# Patient Record
Sex: FEMALE | Race: WHITE
[De-identification: names, ages, dates, MRNs, and addresses within clinical notes are randomized per-mention and may not be internally consistent; named-entity substitution may affect disease eponyms.]

---

## 2021-01-29 ENCOUNTER — HOSPITAL ENCOUNTER (INPATIENT)
Dept: HOSPITAL 92 - ERS | Age: 76
LOS: 4 days | Discharge: NURSING, REHAB OR LONG-TERM CARE | DRG: 377 | End: 2021-02-02
Attending: HOSPITALIST | Admitting: INTERNAL MEDICINE
Payer: MEDICARE

## 2021-01-29 VITALS — BODY MASS INDEX: 28.3 KG/M2

## 2021-01-29 DIAGNOSIS — Z79.899: ICD-10-CM

## 2021-01-29 DIAGNOSIS — Z86.16: ICD-10-CM

## 2021-01-29 DIAGNOSIS — Z90.710: ICD-10-CM

## 2021-01-29 DIAGNOSIS — I25.10: ICD-10-CM

## 2021-01-29 DIAGNOSIS — Z79.4: ICD-10-CM

## 2021-01-29 DIAGNOSIS — N17.9: ICD-10-CM

## 2021-01-29 DIAGNOSIS — Z88.5: ICD-10-CM

## 2021-01-29 DIAGNOSIS — B33.24: ICD-10-CM

## 2021-01-29 DIAGNOSIS — F03.90: ICD-10-CM

## 2021-01-29 DIAGNOSIS — Z79.890: ICD-10-CM

## 2021-01-29 DIAGNOSIS — Z88.8: ICD-10-CM

## 2021-01-29 DIAGNOSIS — Z20.822: ICD-10-CM

## 2021-01-29 DIAGNOSIS — E78.5: ICD-10-CM

## 2021-01-29 DIAGNOSIS — T46.0X5A: ICD-10-CM

## 2021-01-29 DIAGNOSIS — K25.4: Primary | ICD-10-CM

## 2021-01-29 DIAGNOSIS — N39.0: ICD-10-CM

## 2021-01-29 DIAGNOSIS — E03.9: ICD-10-CM

## 2021-01-29 DIAGNOSIS — Z88.4: ICD-10-CM

## 2021-01-29 DIAGNOSIS — T83.511A: ICD-10-CM

## 2021-01-29 DIAGNOSIS — E11.9: ICD-10-CM

## 2021-01-29 DIAGNOSIS — Z95.5: ICD-10-CM

## 2021-01-29 DIAGNOSIS — Z87.891: ICD-10-CM

## 2021-01-29 DIAGNOSIS — E87.1: ICD-10-CM
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
## 2021-01-29 DIAGNOSIS — R79.89: ICD-10-CM

## 2021-01-29 DIAGNOSIS — J12.89: ICD-10-CM

## 2021-01-29 DIAGNOSIS — D62: ICD-10-CM

## 2021-01-29 DIAGNOSIS — B96.5: ICD-10-CM

## 2021-01-29 DIAGNOSIS — Z90.49: ICD-10-CM

## 2021-01-29 DIAGNOSIS — I10: ICD-10-CM

## 2021-01-29 DIAGNOSIS — E87.5: ICD-10-CM

## 2021-01-29 DIAGNOSIS — Z79.01: ICD-10-CM

## 2021-01-29 DIAGNOSIS — I48.0: ICD-10-CM

## 2021-01-29 DIAGNOSIS — Z79.82: ICD-10-CM

## 2021-01-29 DIAGNOSIS — Z95.1: ICD-10-CM

## 2021-01-29 DIAGNOSIS — G93.41: ICD-10-CM

## 2021-01-29 DIAGNOSIS — Z79.51: ICD-10-CM

## 2021-01-29 LAB
ALBUMIN SERPL BCG-MCNC: 2.7 G/DL (ref 3.4–4.8)
ALP SERPL-CCNC: 161 U/L (ref 40–110)
ALT SERPL W P-5'-P-CCNC: 18 U/L (ref 8–55)
ANION GAP SERPL CALC-SCNC: 10 MMOL/L (ref 10–20)
ANION GAP SERPL CALC-SCNC: 12 MMOL/L (ref 10–20)
ANION GAP SERPL CALC-SCNC: 13 MMOL/L (ref 10–20)
APTT PPP: 33.4 SEC (ref 22.9–36.1)
AST SERPL-CCNC: 17 U/L (ref 5–34)
BACTERIA UR QL AUTO: (no result) HPF
BASOPHILS # BLD AUTO: 0 THOU/UL (ref 0–0.2)
BASOPHILS NFR BLD AUTO: 0.3 % (ref 0–1)
BILIRUB SERPL-MCNC: 0.4 MG/DL (ref 0.2–1.2)
BUN SERPL-MCNC: 43 MG/DL (ref 9.8–20.1)
BUN SERPL-MCNC: 54 MG/DL (ref 9.8–20.1)
BUN SERPL-MCNC: 62 MG/DL (ref 9.8–20.1)
CALCIUM SERPL-MCNC: 7.5 MG/DL (ref 7.8–10.44)
CALCIUM SERPL-MCNC: 7.6 MG/DL (ref 7.8–10.44)
CALCIUM SERPL-MCNC: 7.8 MG/DL (ref 7.8–10.44)
CHLORIDE SERPL-SCNC: 101 MMOL/L (ref 98–107)
CHLORIDE SERPL-SCNC: 96 MMOL/L (ref 98–107)
CHLORIDE SERPL-SCNC: 99 MMOL/L (ref 98–107)
CO2 SERPL-SCNC: 25 MMOL/L (ref 23–31)
CO2 SERPL-SCNC: 26 MMOL/L (ref 23–31)
CO2 SERPL-SCNC: 26 MMOL/L (ref 23–31)
CREAT CL PREDICTED SERPL C-G-VRATE: 0 ML/MIN (ref 70–130)
CREAT CL PREDICTED SERPL C-G-VRATE: 0 ML/MIN (ref 70–130)
CREAT CL PREDICTED SERPL C-G-VRATE: 76 ML/MIN (ref 70–130)
DIGOXIN SERPL-MCNC: 2.04 NG/ML (ref 0.8–2)
EOSINOPHIL # BLD AUTO: 0.2 THOU/UL (ref 0–0.7)
EOSINOPHIL NFR BLD AUTO: 1.6 % (ref 0–10)
GLOBULIN SER CALC-MCNC: 2.1 G/DL (ref 2.4–3.5)
GLUCOSE SERPL-MCNC: 127 MG/DL (ref 83–110)
GLUCOSE SERPL-MCNC: 139 MG/DL (ref 83–110)
GLUCOSE SERPL-MCNC: 92 MG/DL (ref 83–110)
HGB BLD-MCNC: 6.8 G/DL (ref 12–16)
HGB BLD-MCNC: 9.7 G/DL (ref 12–16)
INR PPP: 1.2
LEUKOCYTE ESTERASE UR QL STRIP.AUTO: 500 LEU/UL
LYMPHOCYTES # BLD: 0.9 THOU/UL (ref 1.2–3.4)
LYMPHOCYTES NFR BLD AUTO: 9.2 % (ref 21–51)
MCH RBC QN AUTO: 25.4 PG (ref 27–31)
MCV RBC AUTO: 83 FL (ref 78–98)
MONOCYTES # BLD AUTO: 0.4 THOU/UL (ref 0.11–0.59)
MONOCYTES NFR BLD AUTO: 4.4 % (ref 0–10)
NEUTROPHILS # BLD AUTO: 8.5 THOU/UL (ref 1.4–6.5)
NEUTROPHILS NFR BLD AUTO: 84.5 % (ref 42–75)
PLATELET # BLD AUTO: 379 THOU/UL (ref 130–400)
POTASSIUM SERPL-SCNC: 3.9 MMOL/L (ref 3.5–5.1)
POTASSIUM SERPL-SCNC: 4.5 MMOL/L (ref 3.5–5.1)
POTASSIUM SERPL-SCNC: 5.2 MMOL/L (ref 3.5–5.1)
PROT UR STRIP.AUTO-MCNC: 30 MG/DL
PROTHROMBIN TIME: 15.5 SEC (ref 12–14.7)
RBC # BLD AUTO: 2.68 MILL/UL (ref 4.2–5.4)
SODIUM SERPL-SCNC: 129 MMOL/L (ref 136–145)
SODIUM SERPL-SCNC: 132 MMOL/L (ref 136–145)
SODIUM SERPL-SCNC: 133 MMOL/L (ref 136–145)
SP GR UR STRIP: 1.01 (ref 1–1.04)
WBC # BLD AUTO: 10 THOU/UL (ref 4.8–10.8)

## 2021-01-29 PROCEDURE — 30233N1 TRANSFUSION OF NONAUTOLOGOUS RED BLOOD CELLS INTO PERIPHERAL VEIN, PERCUTANEOUS APPROACH: ICD-10-PCS | Performed by: INTERNAL MEDICINE

## 2021-01-29 PROCEDURE — 86900 BLOOD TYPING SEROLOGIC ABO: CPT

## 2021-01-29 PROCEDURE — 81003 URINALYSIS AUTO W/O SCOPE: CPT

## 2021-01-29 PROCEDURE — C9113 INJ PANTOPRAZOLE SODIUM, VIA: HCPCS

## 2021-01-29 PROCEDURE — 96375 TX/PRO/DX INJ NEW DRUG ADDON: CPT

## 2021-01-29 PROCEDURE — 85730 THROMBOPLASTIN TIME PARTIAL: CPT

## 2021-01-29 PROCEDURE — 80162 ASSAY OF DIGOXIN TOTAL: CPT

## 2021-01-29 PROCEDURE — 87186 SC STD MICRODIL/AGAR DIL: CPT

## 2021-01-29 PROCEDURE — 83735 ASSAY OF MAGNESIUM: CPT

## 2021-01-29 PROCEDURE — 85610 PROTHROMBIN TIME: CPT

## 2021-01-29 PROCEDURE — 36415 COLL VENOUS BLD VENIPUNCTURE: CPT

## 2021-01-29 PROCEDURE — 82270 OCCULT BLOOD FECES: CPT

## 2021-01-29 PROCEDURE — P9016 RBC LEUKOCYTES REDUCED: HCPCS

## 2021-01-29 PROCEDURE — 36430 TRANSFUSION BLD/BLD COMPNT: CPT

## 2021-01-29 PROCEDURE — 93306 TTE W/DOPPLER COMPLETE: CPT

## 2021-01-29 PROCEDURE — 86901 BLOOD TYPING SEROLOGIC RH(D): CPT

## 2021-01-29 PROCEDURE — 85027 COMPLETE CBC AUTOMATED: CPT

## 2021-01-29 PROCEDURE — 86850 RBC ANTIBODY SCREEN: CPT

## 2021-01-29 PROCEDURE — 96365 THER/PROPH/DIAG IV INF INIT: CPT

## 2021-01-29 PROCEDURE — 88312 SPECIAL STAINS GROUP 1: CPT

## 2021-01-29 PROCEDURE — 80053 COMPREHEN METABOLIC PANEL: CPT

## 2021-01-29 PROCEDURE — 94760 N-INVAS EAR/PLS OXIMETRY 1: CPT

## 2021-01-29 PROCEDURE — 36416 COLLJ CAPILLARY BLOOD SPEC: CPT

## 2021-01-29 PROCEDURE — 87040 BLOOD CULTURE FOR BACTERIA: CPT

## 2021-01-29 PROCEDURE — 87077 CULTURE AEROBIC IDENTIFY: CPT

## 2021-01-29 PROCEDURE — 88305 TISSUE EXAM BY PATHOLOGIST: CPT

## 2021-01-29 PROCEDURE — 83605 ASSAY OF LACTIC ACID: CPT

## 2021-01-29 PROCEDURE — 87086 URINE CULTURE/COLONY COUNT: CPT

## 2021-01-29 PROCEDURE — 81015 MICROSCOPIC EXAM OF URINE: CPT

## 2021-01-29 PROCEDURE — 80048 BASIC METABOLIC PNL TOTAL CA: CPT

## 2021-01-29 PROCEDURE — 93010 ELECTROCARDIOGRAM REPORT: CPT

## 2021-01-29 PROCEDURE — 93005 ELECTROCARDIOGRAM TRACING: CPT

## 2021-01-29 PROCEDURE — 85025 COMPLETE CBC W/AUTO DIFF WBC: CPT

## 2021-01-29 NOTE — CON
DATE OF CONSULTATION:  01/29/2021



REASON FOR CONSULTATION:  Elevated digoxin level and history of atrial fibrillation.



PRIMARY CARDIOLOGIST:  Jj Donnelly MD



HISTORY OF PRESENT ILLNESS:  Ms. Garza is a very pleasant 75-year-old white female,

who comes to the hospital for anemia.  She was in the hospital for COVID-19

pneumonia in December of this year.  At that point, she developed what appeared to

be a viral cardiomyopathy, EF was as low as 20%.  She also developed AFib and RVR

and she was placed on diltiazem, Multaq, and digoxin.  She eventually had

improvement of her LV function up to 50% on the last echo on the 8th of December.

She was discharged home eventually.  She is back here as she was confused.  She was

found to have urinary tract infection.  She had fevers and confusion.  She has some

level of dementia.  She was also found to have a very low hemoglobin of around 6.

She has been transfused, and Cardiology has been consulted as her digoxin level was

just slightly above normal, so there was a question as to any level of digitoxicity

present.  On my evaluation, Ms. Garza is still pleasantly demented.  She seems to

have moments of lucidity and then she goes on a tangent and realized that she may

have not been comprehending what you were asking her to begin with.  She is denying

any chest pain, no tightness, no passing out as far as she can tell; however, her

history is not entirely accurate. 



PAST MEDICAL HISTORY:  

1. Paroxysmal atrial fibrillation.

2. Coronary artery disease, status post CABG.

3. Status post mitral valve repair.

4. Type 2 diabetes.

5. Hypertension.

6. COVID-19 infection in December.

7. 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

most recent EF at 50%, resolved. 

8. Dementia.



PAST SURGICAL HISTORY:  

1. CABG as above.

2. Heart catheterization and angioplasty with stent placement in the past.

3. Apparent history of mitral valve repair.

4. Watchman device placement several years back secondary to vaginal bleeding.

5. Hysterectomy.

6. Cholecystectomy.

7. Right wrist surgery.



OUTPATIENT MEDICATIONS:  

1. Lasix 40 mg a day.

2. Metformin.

3. Aspirin 81 mg a day.

4. Levothyroxine 112 mcg a day.

5. Metolazone 5 mg a day.

6. Potassium chloride 60 mEq a day.

7. Spironolactone 25 mg a day.

8. Dronedarone 400 mg b.i.d.

9. Eliquis 5 mg p.o. b.i.d.

10. Gabapentin 100 mg p.o. t.i.d.

11. Cozaar 25 mg at bedtime.

12. Pantoprazole 40 mg a day.

13. Alogliptin 25 mg a day.

14. Digoxin 0.125 mg a day.

15. Diltiazem 120 mg a day.

16. Ventolin p.r.n.



ALLERGIES:  

1. CAROL PARABENS.

2. ATORVASTATIN.

3. CODEINE.

4. ESOMEPRAZOLE.

5. LEVOFLOXACIN.

6. SIMVASTATIN.



FAMILY HISTORY:  Noncontributory, obtained from the record.



SOCIAL HISTORY:  Quit smoking 10 years ago.  No alcohol or drugs.



REVIEW OF SYSTEMS:  Difficult to obtain secondary to waxing and waning mental status.



PHYSICAL EXAMINATION:

VITAL SIGNS:  Temperature 98.2, max in the ER was 100.5, pulse of 77, respiratory

rate 20, saturating 95% on 3 L, and blood pressure 110/60. 

GENERAL:  Awake, alert, and oriented to person and place.  She waxes and wanes and

she could not get time, in no distress. 

HEENT:  Normocephalic, atraumatic. 

NECK:  Supple. 

LUNGS:  Clear. 

CARDIOVASCULAR:  S1 and S2, heart rate in the 70s. 

ABDOMEN:  Soft.  Positive bowel sounds. 

EXTREMITIES:  1+ edema. 

SKIN:  Warm and dry.



DIAGNOSTIC STUDIES:  Laboratory work was reviewed.  CBC with a white count of 10,

hemoglobin of 6.8, hematocrit 22, platelet count of 379.  Coags; INR 1.2.  Chemistry

with a sodium of 129 on admission, up to 133; potassium was 5.2 on admission, down

to 4.5.  Creatinine better from 1.3 to 1.2.  UA was positive with 2+ nitrites and

greater than 50 white cells and 1+ bacteria.  Digoxin level was 2.04, which is just

slightly above normal level, which is 2.0. 



EKG was reviewed.  It got a lot of baseline artifacts, so cannot completely rule out

this being just a normal sinus rhythm with heart rate in the 70s, seems fairly

regular.  Computer read it out as junctional.  I do not think I have a good enough

EKG to give a diagnosis of junctional rhythm.  We will repeat EKG. 



ASSESSMENT AND PLAN:  

1. Paroxysmal atrial fibrillation.

2. Acute blood loss anemia, most likely related to the Eliquis.  No overt blood

loss, however, there is possible dark stool on history. 

3. History of Watchman device placement.  Unclear as to why she was restarted on the

Eliquis.  It may be that the Watchman was not closing the appendage adequately.

However, we will request records, try to see the reason for this. 

4. Elevated digoxin level.

5. Hyperkalemia, resolved.

6. Recent COVID-19 infection.

7. Recent viral cardiomyopathy with reduced ejection fraction, now improved and most

recent echo with normalized ejection fraction. 



PLAN:  

1. Definitely, I have to hold digoxin.

2. She is not digitoxic, however, her level being a little bit elevated and her

creatinine being slightly elevated, would make me want to stop digoxin. 

3. We would transfer her to telemetry.

4. We would stop Multaq and diltiazem at this time until digoxin level is lower and

I make sure that she is not in junctional rhythm. 

5. We will get an echocardiogram to make sure her LV function has not changed from

the last one just about a month and a half ago. 

6. We would discontinue Eliquis indefinitely.  She has a Watchman device.  I am

going to try to get records to see why she is on full anticoagulation as she already

has a Watchman, which I can see on her chest x-ray. 



Thank you for letting us to participate in the care of your patient.  We will follow.







Job ID:  [id number]

## 2021-01-29 NOTE — PDOC.HHP
Hospitalist HPI


Abnormal labs


History of Present Illness: 





Ms. Garza is a 75-year-old female with past medical history of A. fib on E

liquis,  type 2 diabetes mellitus, hypertension, status post COVID-19 infection 

in December.,  Coronary artery disease status post CABG, dementia of who 

presents from [facility] for abnormal labs.  Patient initially discharged from 

hospital to rehab center after Covid infection in December.  On presentation 

patient reports multiple dark stools.  History limited by patient's dementia.  

Patient able to answer questions but often gets confused during interview.





On my interview she denies ever having any bloody or dark stools although did 

endorse several dark bloody stools to the emergency room physician.  She reports

her breathing feels fine, but does report her breathing was worse in December 

when she had Covid.  She denies any abdominal pain no nausea vomiting diarrhea 

no hematochezia or hematemesis.  Patient has an indwelling Gonzalez catheter and 

denies suprapubic pain or any changes in urination.  She does report that she is

on a blood thinner Eliquis that she takes daily.  She reports that she feels 

tired, but denies any dizziness lightheadedness changes in vision.  Denies chest

pain, shortness of breath, palpitations.  Denies numbness weakness or 

paresthesias.





In emergency room initial vital signs 115/59, 72, 18, 96% on 2 L, 100.5.  WBC 

10.0, H/H 6.8/22.2, platelets 379.  BUN/CR 62/1.38, sodium 129, potassium 5.2.  

Guaiac positive.  Patient received ceftriaxone, Protonix, 1 L of normal saline 

and 2 units of packed red blood cells were started.


Allergies/Adverse Reactions: 


                                        











Allergy/AdvReac Type Severity Reaction Status Date / Time


 


Anesthetics - Amide Type - Allergy   Verified 12/02/20 18:59





Select A     


 


Anesthetics - Miriam Type- Allergy   Verified 12/02/20 18:59





Parabens     


 


atorvastatin Allergy   Verified 01/29/21 16:17


 


codeine [Codeine] Allergy   Verified 01/29/21 16:16


 


esomeprazole Allergy   Verified 01/29/21 16:17


 


levofloxacin [From Levaquin] Allergy   Verified 01/29/21 16:16


 


simvastatin Allergy   Verified 01/29/21 16:17











Home Medications: 


                                        











 Medication  Instructions  Recorded  Confirmed  Type


 


Acetaminophen [Tylenol 8 Hour] 650 mg PO Q6HR PRN 01/29/21 01/29/21 History


 


Albuterol Sulfate [Albuterol 0.63 mg NEB Q4HR PRN 01/29/21 01/29/21 History





Sulfate Neb]    


 


Albuterol Sulfate [Albuterol 0.63 mg NEB Q6HR PRN 01/29/21 01/29/21 History





Sulfate Neb]    


 


Albuterol Sulfate [Ventolin HFA] 1 puff INH Q6HR PRN 01/29/21 01/29/21 History


 


Alogliptin Benzoate [Nesina] 25 mg PO DAILY 01/29/21 01/29/21 History


 


Apixaban [Eliquis] 5 mg PO BID 01/29/21 01/29/21 History


 


Aspirin [Aspirin EC] 81 mg PO DAILY 01/29/21 01/29/21 History


 


Budesonide [Pulmicort] 0.5 mg IH Q12HR PRN 01/29/21 01/29/21 History


 


Digoxin [Lanoxin] 0.125 mg PO DAILY 01/29/21 01/29/21 History


 


Diltiazem HCl 120 mg PO TID 01/29/21 01/29/21 History


 


Dronedarone HCl [Multaq] 400 mg PO BID-WM 01/29/21 01/29/21 History


 


Furosemide 40 mg PO DAILY 01/29/21 01/29/21 History


 


Gabapentin 200 mg PO TID 01/29/21 01/29/21 History


 


Ibuprofen 600 mg PO DAILY 01/29/21 01/29/21 History


 


Insulin Glargine,Hum.Rec.Anlog 20 unit SQ HS 01/29/21 01/29/21 History





[Basaglar Kwikpen U-100]    


 


Levothyroxine Sodium 125 mcg PO DAILY 01/29/21 01/29/21 History





[Levothyroxine]    


 


Losartan [Cozaar] 25 mg PO DAILY 01/29/21 01/29/21 History


 


Magnesium Oxide [Magnesium] 400 mg PO DAILY 01/29/21 01/29/21 History


 


Metolazone [Zaroxolyn] 5 mg PO DAILY 01/29/21 01/29/21 History


 


Pantoprazole [Protonix] 40 mg PO DAILY 01/29/21 01/29/21 History


 


Polyethylene Glycol 3350 [Miralax] 17 gm PO Q12HR PRN 01/29/21 01/29/21 History


 


Potassium Chloride 20 meq PO DAILY 01/29/21 01/29/21 History


 


Sennosides [Senokot] 1 tab PO DAILY PRN 01/29/21 01/29/21 History


 


Spironolactone 25 mg PO DAILY 01/29/21 01/29/21 History


 


metFORMIN [Glucophage] 500 mg PO BID-WM 01/29/21 01/29/21 History


 


traMADol HCl [Tramadol HCl] 50 mg PO Q6HR PRN 01/29/21 01/29/21 History











Past History: 


PMHx:   A. fib on Eliquis, digoxin, coronary artery disease status post CABG, 

type 2 diabetes mellitus, hypertension, hyperlipidemia, indwelling Gonzalez, status

post COVID-19 infection in December 2020.





PSHx:   CABG, angioplasty with stent placement





FHx:   Unable to obtain





Social: Former tobacco smoker with a 10 years ago, no alcohol or drug use








Hospitalist HPI ROS


ROS unobtainable: due to mental status


Constitutional: reports: weakness, malaise


Eyes: denies: vision change


ENT: denies: nose congestion, mouth swelling, throat pain


Respiratory: denies: cough, dry, shortness of breath, hemoptysis, SOB with 

excertion, pleuritic pain, sputum, wheezing, other


Cardiovascular: denies: chest pain, palpitations, orthopnea, paroxysmal noc. 

dyspnea, edema, light headedness, other


Gastrointestinal: reports: melena.  denies: nausea, vomiting, abdominal pain, 

diarrhea, constipation, hematochezia, other


Genitourinary: denies: dysuria, frequency, incontinence, hematuria, retention, 

other


Musculoskeletal: denies: neck pain, shoulder pain, arm pain, back pain, hand 

pain, leg pain, foot pain, other


Skin: denies: rash, lesions, chelle, bruising, other


Neurological: denies: weakness, numbness, incoordination, change in speech, 

confusion, seizures, other





Hospitalist Exam


General Appearance: NAD, awake alert


Eye: PERRL, anicteric sclera


ENT: normocephalic atraumatic, no oropharyngeal lesions, moist mucosa


Neck: supple, symmetric, no JVD, no thyromegaly, no lymphadenopathy, no carotid 

bruit


Heart: no gallops, no rubs, normal peripheral pulses, murmur present


Respiratory: CTAB, no wheezes, no rales, no ronchi, normal chest expansion, no 

tachypnea, normal percussion


Gastrointestinal: soft, non-tender, non-distended, normal bowel sounds, no 

palpable masses, no hepatomegaly, no splenomegaly, no bruit


Extremities: no cyanosis, no clubbing


Extremities - other findings: Trace peripheral edema


Skin: normal turgor, no lesions, no rashes


Neurological: cranial nerve grossly intact, normal sensation to touch, no 

weakness, no focal deficits, no new deficit


Musculoskeletal: normal tone, normal strength, no muscle wasting


Psychiatric: normal behavior, oriented to person


Psychiatric - other findings: Confused ANO x2





Hospitalist Results


Result Diagrams: 


                                 01/29/21 11:01





                                 01/29/21 15:16


Lab results: 


                             Laboratory Last Values











WBC  10.0 thou/uL (4.8-10.8)   01/29/21  11:01    


 


RBC  2.68 mill/uL (4.20-5.40)  L  01/29/21  11:01    


 


Hgb  6.8 g/dL (12.0-16.0)  L  01/29/21  11:01    


 


Hct  22.2 % (36.0-47.0)  L  01/29/21  11:01    


 


MCV  83.0 fL (78.0-98.0)   01/29/21  11:01    


 


MCH  25.4 pg (27.0-31.0)  L  01/29/21  11:01    


 


MCHC  30.6 g/dL (32.0-36.0)  L  01/29/21  11:01    


 


RDW  15.7 % (11.5-14.5)  H  01/29/21  11:01    


 


Plt Count  379 thou/uL (130-400)   01/29/21  11:01    


 


MPV  8.4 fL (7.4-10.4)   01/29/21  11:01    


 


Neutrophils %  84.5 % (42.0-75.0)  H  01/29/21  11:01    


 


Lymphocytes %  9.2 % (21.0-51.0)  L  01/29/21  11:01    


 


Monocytes %  4.4 % (0.0-10.0)   01/29/21  11:01    


 


Eosinophils %  1.6 % (0.0-10.0)   01/29/21  11:01    


 


Basophils %  0.3 % (0.0-1.0)   01/29/21  11:01    


 


Neutrophils #  8.5 thou/uL (1.40-6.50)  H  01/29/21  11:01    


 


Lymphocytes #  0.9 thou/uL (1.20-3.40)  L  01/29/21  11:01    


 


Monocytes #  0.4 thou/uL (0.11-0.59)   01/29/21  11:01    


 


Eosinophils #  0.2 thou/uL (0.0-0.7)   01/29/21  11:01    


 


Basophils #  0.0 thou/uL (0.0-0.2)   01/29/21  11:01    


 


PT  15.5 sec (12.0-14.7)  H  01/29/21  11:01    


 


INR  1.2   01/29/21  11:01    


 


APTT  33.4 sec (22.9-36.1)   01/29/21  11:01    


 


Sodium  129 mmol/L (136-145)  L  01/29/21  11:01    


 


Potassium  5.2 mmol/L (3.5-5.1)  H  01/29/21  11:01    


 


Chloride  96 mmol/L ()  L  01/29/21  11:01    


 


Carbon Dioxide  26 mmol/L (23-31)   01/29/21  11:01    


 


Anion Gap  12 mmol/L (10-20)   01/29/21  11:01    


 


BUN  62 mg/dL (9.8-20.1)  H  01/29/21  11:01    


 


Creatinine  1.38 mg/dL (0.6-1.1)  H  01/29/21  11:01    


 


Estimated GFR (MDRD)  37   01/29/21  11:01    


 


Glucose  139 mg/dL ()  H  01/29/21  11:01    


 


Lactic Acid  1.2 mmol/L (0.5-2.2)   01/29/21  13:38    


 


Calcium  7.8 mg/dL (7.8-10.44)   01/29/21  11:01    


 


Total Bilirubin  0.4 mg/dL (0.2-1.2)   01/29/21  11:01    


 


AST  17 U/L (5-34)   01/29/21  11:01    


 


ALT  18 U/L (8-55)   01/29/21  11:01    


 


Alkaline Phosphatase  161 U/L ()  H  01/29/21  11:01    


 


Serum Total Protein  4.8 g/dL (5.8-8.1)  L  01/29/21  11:01    


 


Albumin  2.7 g/dL (3.4-4.8)  L  01/29/21  11:01    


 


Globulin  2.1 g/dL (2.4-3.5)  L  01/29/21  11:01    


 


Albumin/Globulin Ratio  1.3 g/dL (1.2-2.2)   01/29/21  11:01    


 


Urine Color  Yellow  (Yellow)   01/29/21  12:10    


 


Urine Clarity  Turbid  (Clear)  A  01/29/21  12:10    


 


Urine pH  6.5  (5.0-9.0)   01/29/21  12:10    


 


Ur Specific Gravity  1.014  (1.002-1.036)   01/29/21  12:10    


 


Urine Protein  30 mg/dL (Neg-Trace)  A  01/29/21  12:10    


 


Urine Glucose (UA)  Normal mg/dL (Negative)   01/29/21  12:10    


 


Urine Ketones  Negative mg/dL (Negative)   01/29/21  12:10    


 


Urine Blood  1+  (Negative)  A  01/29/21  12:10    


 


Urine Nitrite  2+  (Negative)  A  01/29/21  12:10    


 


Urine Bilirubin  Negative  (Negative)   01/29/21  12:10    


 


Urine Urobilinogen  Normal mg/dL (Less than 2)   01/29/21  12:10    


 


Ur Leukocyte Esterase  500 Aditya/uL (Negative)  A  01/29/21  12:10    


 


Urine RBC  11-20 HPF (0-3)  A  01/29/21  12:10    


 


Urine WBC  Greater than 50 HPF (0-3)  A  01/29/21  12:10    


 


Ur Squamous Epith Cells  None Seen HPF (0-3)   01/29/21  12:10    


 


Urine Bacteria  1+ HPF (None Seen)  A  01/29/21  12:10    


 


Digoxin  2.04 ng/mL (0.8-2.0)  H*  01/29/21  11:01    


 


Blood Type  O NEGATIVE   01/29/21  11:25    


 


Antibody Screen  NEGATIVE   01/29/21  11:01    


 


Crossmatch  See Detail   01/29/21  11:01    














Hospitalist H&P A/P


Plan: 





Upper GI bleed


P/w anemia H/H 6.8/22.2.  Down from 10.3 in December.  Patient reports melenotic

stools.  Guaiac positive.  On Eliquis 2.5 mg twice daily.  Denies abdominal pain

nausea vomiting diarrhea.  BUN elevated at 62.  Will obtain GI consult, 

transfuse 2 units packed red blood cells, trend H&H.





Plan


2 units packed red blood cells


Trend H&H


IV Protonix twice daily


Strict n.p.o.


GI consult, recommendations appreciated





Elevated Digoxin Level


Patient with elevated digoxin level 2.04.  Potassium also high at 5.2, sodium 

129. No EKG changes. Will hold digoxin, trend potassium and place on telemetry 

monitoring.  Obtain cardiology consult for further recommendations and dosing.





Plan


Hold digoxin


Trend potassium, digoxin level


-Telemetry monitoring


Cardiology consult recommendations appreciated








Urinary tract infection


UA grossly positive.  Patient has indwelling catheter.  Patient is febrile to 

100.5.  WBC 10.0.  Patient is not tachycardic nor tachypneic.  Will cover with 

ceftriaxone and await urine culture.





Plan


Ceftriaxone


Remove indwelling catheter


Follow urine culture





Acute Kidney Injury


BUN/Cr 62/1.4. Baseline Cr <1.0. Likely secondary to hypovolemia. Will continue 

with pRBCs and IVF. 





Plan


-IVF


-Trend renal function


-Avoid nephrotoxic agents where possible


-Renal dosing as appropriate 





Hyperkalemia 


Potassium 5.3 on admission. Likely 2/2 Digoxin toxicity. Will hold digoxin, 

trend potassium and treat if indicated.





Plan


-Trend K+


-Telemetry monitoring


-Hold digoxin





Hyponatremia


Na 129. Suspect hypovolemic hyponatremia 2/2 GIB, but will obtain serum osm, 

urine osms, electrolytes. 





Plan


-Trend sodium


-Serum osm


-Urine osm, electrolytes





Atrial fibrillation


History of A. fib on Eliquis, digoxin, diltiazem and Multaq.  Will hold Eliquis 

secondary to GI bleed.  Will hold digoxin secondary to digoxin toxicity.  We 

will continue diltiazem and Multaq.  Cardiology consult for further rec

ommendations.





Plan


Hold Eliquis


Hold digoxin


Continue diltiazem, Multaq


Cardiology consult recommendations appreciated





Coronary artery disease


History of coronary artery disease status post CABG and cardiac stents.  Will 

hold aspirin in setting of GI bleed.





Type 2 diabetes mellitus


Type 2 diabetes we will hold oral agents in setting of upper GI bleed.  Will 

place on ISS, every 6 hours glucose checks.





Hypothyroidism


Continue home levothyroxine





DVT prophylaxiscontraindicated secondary to GI bleed


Unable to establish CODE STATUS due to patient's dementia.  Will reach out to 

family to confirm.





Case discussed with attending physician, Dr. Myers.

## 2021-01-29 NOTE — CON
DATE OF CONSULTATION:  01/29/2021



REASON FOR CONSULTATION:  Possible upper GI bleed and stated dark-colored stools.



CONSULTING PROVIDER:  Ms. Love Nicole.



HISTORY OF PRESENT ILLNESS:  The patient is a 75-year-old female with past medical

history of diabetes, hypertension, COVID infection in 12/2020 with a viral

cardiomyopathy, coronary artery disease status post CABG, atrial fibrillation on

Eliquis, but also status post Watchman procedure, an indwelling Gonzalez catheter, and

dementia, presenting with complaints of darker colored stools.  At the time of the

interview, the patient was alert and oriented only to herself with further

information taken possibly unreliable.  She states that she has been having darker

colored stools since 10/2020, that have been characterized as dark brown in

coloration, but not necessarily black.  She would have approximately 1-2 semi-solid

or liquid bowel movements per day, but she had also been taking MiraLAX daily as

part of treatment of chronic constipation.  During the same time period, she

endorses a decreased appetite, but does not endorse any significant decrease in her

weight.  Otherwise, she denies any hematochezia, hematemesis, nausea, vomiting,

fevers, chills, abdominal pain, dysphagia, odynophagia, or weight loss. 



On evaluation in the ER, she had a rectal exam with a stated history of possible

dark black stool obtained and guaiac positive.  However, when the patient was

brought to the floor and changed on the floor, she had yellow-colored stool. 



REVIEW OF SYSTEMS:  A 10-category review of systems could not be obtained due to the

patient's altered mental status. 



PAST MEDICAL HISTORY:  As per HPI.



PAST SURGICAL HISTORY:  

1. CABG.

2. Angioplasty with stent placement.

3. Watchman procedure.

4. Mitral valve repair.

5. Hysterectomy.

6. Cholecystectomy.

7. Right wrist surgery.



FAMILY HISTORY:  The patient does not recall any GI malignancies (although the

information may be unreliable). 



SOCIAL HISTORY:  No mention of tobacco, alcohol, or illicit drug use in her chart.



OUTPATIENT MEDICATIONS:  Reviewed.



ALLERGIES:  CAROL PARABENS, ATORVASTATIN, CODEINE, ESOMEPRAZOLE, LEVOFLOXACIN, AND

SIMVASTATIN. 



PHYSICAL EXAMINATION:

VITAL SIGNS:  Temperature 98.7, pulse 81, blood pressure 114/52, respiratory rate

17, and saturating 99% on 3 L nasal cannula. 

GENERAL:  The patient was lying in bed, in no acute distress.  Alert and oriented

x1. 

HEENT:  Normocephalic and atraumatic.  Neck is supple.  No JVD or scleral icterus

noted. 

CARDIOVASCULAR:  Irregularly irregular rhythm with no discernable murmurs, gallops,

or rubs. 

RESPIRATORY:  Clear to auscultation bilaterally, but with diminished inspiratory

effort. 

ABDOMEN:  Normoactive bowel sounds.  Soft, nontender, and nondistended. 

EXTREMITIES:  No cyanosis, clubbing, or edema. 

BACK:  A stage III decubitus ulceration was seen just superior to the anal orifice

along the gluteal cleft. 



LABORATORY DATA:  CBC with a white blood cell count of 10, hemoglobin 6.8,

hematocrit 22.2, and platelets 379.  INR 1.2.  Chemistry with a sodium of 129,

potassium 5.2, chloride 96, CO2 of 26, BUN 62, creatinine 1.38, and glucose 139.

AST 17, ALT 18, alkaline phosphatase 161, and total bilirubin 0.4.  Digoxin 2.04.

Urinalysis was consistent with a urinary tract infection, but also did show a fair

amount of blood. 



IMAGING DATA:  No current GI imaging is available for review.



ASSESSMENT AND PLAN:  The patient is a 75-year-old female with past medical history

of diabetes, hypertension, indwelling Gonzalez catheter, dementia, COVID infection in

12/2020 with a viral cardiomyopathy, coronary artery disease status post coronary

artery bypass graft, and atrial fibrillation on Eliquis, although the patient has

had a Watchman procedure, presenting with significant anemia and darker colored

stools, concerning for gastrointestinal bleeding. 



Gastrointestinal bleeding:  The patient is presenting with a history of darker

colored stools that cannot be confirmed via the patient, but in the ER was noted to

have darker colored stools while doing the digital rectal examination.  She also

does have a significant decrease in her hemoglobin and hematocrit when compared to

previous as well as an elevated BUN to creatinine ratio, which is concerning for

possible gastrointestinal bleed.  However, per my evaluation, she did not have any

evidence of melena on bedside exam, but did have a significant decubitus ulceration,

which could potentially generate anemia over time.  At this time, it is unknown if

the patient does have an upper gastrointestinal bleed, but again given her decreased

hemoglobin and hematocrit, elevated BUN to creatinine ratio, and concurrent

administration of chronic anticoagulation, I think an upper gastrointestinal

bleeding source needs to be ruled out. 



RECOMMENDATIONS:  

1. Would continue to trend her hemoglobin and hematocrit and transfuse as necessary

to maintain the hemoglobin and hematocrit of 7/21. 

2. Continue to monitor clinically for signs of active GI bleeding.

3. Would hold any anticoagulation on this patient and would confer with Cardiology

about stopping it indefinitely given her Watchman maneuver/procedure in place. 

4. Would make the patient n.p.o. at midnight in anticipation of an EGD tomorrow.

5. Would place the patient on PPI 40 mg IV b.i.d. if able given her allergy list,

but if unable to give her PPI, would transfer to famotidine 40 mg IV b.i.d. 

6. Continue with IV fluid resuscitation per Cardiology instruction.

7. If the patient has a negative upper endoscopy, I would be reluctant to proceed

with colonoscopy for further evaluation while she continues to have decreasing

hemoglobin and hematocrit during this hospitalization. 

We will continue to follow.  Please call with any questions.







Job ID:  [id number]

## 2021-01-30 LAB
ANION GAP SERPL CALC-SCNC: 12 MMOL/L (ref 10–20)
ANION GAP SERPL CALC-SCNC: 13 MMOL/L (ref 10–20)
ANION GAP SERPL CALC-SCNC: 14 MMOL/L (ref 10–20)
BASOPHILS # BLD AUTO: 0 THOU/UL (ref 0–0.2)
BASOPHILS NFR BLD AUTO: 0.2 % (ref 0–1)
BUN SERPL-MCNC: 29 MG/DL (ref 9.8–20.1)
BUN SERPL-MCNC: 33 MG/DL (ref 9.8–20.1)
BUN SERPL-MCNC: 34 MG/DL (ref 9.8–20.1)
CALCIUM SERPL-MCNC: 7.8 MG/DL (ref 7.8–10.44)
CALCIUM SERPL-MCNC: 7.8 MG/DL (ref 7.8–10.44)
CALCIUM SERPL-MCNC: 8.2 MG/DL (ref 7.8–10.44)
CHLORIDE SERPL-SCNC: 100 MMOL/L (ref 98–107)
CHLORIDE SERPL-SCNC: 101 MMOL/L (ref 98–107)
CHLORIDE SERPL-SCNC: 101 MMOL/L (ref 98–107)
CO2 SERPL-SCNC: 23 MMOL/L (ref 23–31)
CO2 SERPL-SCNC: 25 MMOL/L (ref 23–31)
CO2 SERPL-SCNC: 26 MMOL/L (ref 23–31)
CREAT CL PREDICTED SERPL C-G-VRATE: 90 ML/MIN (ref 70–130)
CREAT CL PREDICTED SERPL C-G-VRATE: 91 ML/MIN (ref 70–130)
CREAT CL PREDICTED SERPL C-G-VRATE: 94 ML/MIN (ref 70–130)
DIGOXIN SERPL-MCNC: 1.44 NG/ML (ref 0.8–2)
EOSINOPHIL # BLD AUTO: 0.2 THOU/UL (ref 0–0.7)
EOSINOPHIL NFR BLD AUTO: 1.8 % (ref 0–10)
GLUCOSE SERPL-MCNC: 67 MG/DL (ref 83–110)
GLUCOSE SERPL-MCNC: 68 MG/DL (ref 83–110)
GLUCOSE SERPL-MCNC: 93 MG/DL (ref 83–110)
HGB BLD-MCNC: 10.8 G/DL (ref 12–16)
HGB BLD-MCNC: 9.7 G/DL (ref 12–16)
HGB BLD-MCNC: 9.8 G/DL (ref 12–16)
LYMPHOCYTES # BLD: 1.1 THOU/UL (ref 1.2–3.4)
LYMPHOCYTES NFR BLD AUTO: 12.2 % (ref 21–51)
MCH RBC QN AUTO: 27.1 PG (ref 27–31)
MCV RBC AUTO: 83.4 FL (ref 78–98)
MONOCYTES # BLD AUTO: 0.5 THOU/UL (ref 0.11–0.59)
MONOCYTES NFR BLD AUTO: 5.3 % (ref 0–10)
NEUTROPHILS # BLD AUTO: 7.2 THOU/UL (ref 1.4–6.5)
NEUTROPHILS NFR BLD AUTO: 80.6 % (ref 42–75)
PLATELET # BLD AUTO: 312 THOU/UL (ref 130–400)
POTASSIUM SERPL-SCNC: 3.7 MMOL/L (ref 3.5–5.1)
POTASSIUM SERPL-SCNC: 3.7 MMOL/L (ref 3.5–5.1)
POTASSIUM SERPL-SCNC: 3.8 MMOL/L (ref 3.5–5.1)
RBC # BLD AUTO: 3.62 MILL/UL (ref 4.2–5.4)
SODIUM SERPL-SCNC: 134 MMOL/L (ref 136–145)
SODIUM SERPL-SCNC: 134 MMOL/L (ref 136–145)
SODIUM SERPL-SCNC: 135 MMOL/L (ref 136–145)
WBC # BLD AUTO: 8.9 THOU/UL (ref 4.8–10.8)

## 2021-01-30 PROCEDURE — 0DB78ZX EXCISION OF STOMACH, PYLORUS, VIA NATURAL OR ARTIFICIAL OPENING ENDOSCOPIC, DIAGNOSTIC: ICD-10-PCS | Performed by: INTERNAL MEDICINE

## 2021-01-30 NOTE — PDOC.CPN
- Subjective


Date: 01/30/21


Time: 16:32


Interval history: 





No new issues. Doing well. 





- Review of Systems


General: denies: fever/chills, weight/appetite/sleep changes, night sweats, 

fatigue


Respiratory: denies: cough, congestion, shortness of breath, exercise 

intolerance


Cardiovascular: denies: chest pain, palpitation, edema, paroxysmal nocturnal 

dyspnea, orthopnea


Gastrointestinal: denies: nausea, vomiting, diarrhea, constipation, abd pain, GI

bleeding


Musculoskeletal: denies: pain, tenderness, stiffness, swelling, 

arthritis/arthralgias


Neurological: denies: numbness, syncope, seizure, weakness





- Objective


Allergies/Adverse Reactions: 


                                    Allergies











Allergy/AdvReac Type Severity Reaction Status Date / Time


 


Anesthetics - Amide Type - Allergy   Verified 12/02/20 18:59





Select A     


 


Anesthetics - Miriam Type- Allergy   Verified 12/02/20 18:59





Parabens     


 


atorvastatin Allergy   Verified 01/29/21 16:17


 


codeine [Codeine] Allergy   Verified 01/29/21 16:16


 


esomeprazole Allergy   Verified 01/29/21 16:17


 


levofloxacin [From Levaquin] Allergy   Verified 01/29/21 16:16


 


simvastatin Allergy   Verified 01/29/21 16:17











Visit Medications: 


                               Current Medications





Acetaminophen (Acetaminophen 325 Mg Tab)  650 mg PO Q4H PRN


   PRN Reason: Headache/Fever/Mild Pain (1-3)


Acetaminophen (Acetaminophen 650 Mg Suppository)  650 mg RI Q4H PRN


   PRN Reason: Headache/Fever/Mild Pain (1-3)


Albuterol Sulfate (Albuterol 200 Puff (6.7gm Inhaler))  1 puff INH Q6HR PRN


   PRN Reason: SOB &/or Wheezing


Gabapentin (Gabapentin 100 Mg Cap)  200 mg PO TID [facility]


   Last Admin: 01/30/21 14:39 Dose:  200 mg


   Documented by: 


Levofloxacin 500 mg/ Device  100 mls @ 100 mls/hr IVPB Q24HR [facility]


   Last Admin: 01/30/21 12:43 Dose:  Not Given


   Documented by: 


Levothyroxine Sodium (Levothyroxine Sodium 112 Mcg Tab)  112 mcg PO 0600 [facility]


   Last Admin: 01/30/21 06:16 Dose:  112 mcg


   Documented by: 


Magnesium Oxide (Magnesium Oxide 400 Mg Tab)  400 mg PO DAILY [facility]


Pantoprazole Sodium (Pantoprazole 40 Mg Vial)  40 mg IVP Q12HR [facility]


   Last Admin: 01/30/21 11:55 Dose:  40 mg


   Documented by: 


Sodium Chloride (Flush - Normal Saline 10 Ml Syringe)  10 ml IVF PRN PRN


   PRN Reason: Saline Flush








Vital Signs & Weight: 


                                   Vital Signs











  Temp Pulse Resp BP Pulse Ox


 


 01/30/21 15:00  98.3 F  103 H  19  115/59 L  97


 


 01/30/21 11:35  98.9 F  93  18  137/70  95


 


 01/30/21 07:00  98.3 F  94  19  149/62 H  97








                                        











Admit Weight                   165 lb


 


Weight                         165 lb 5.547 oz

















- Physical Exam


General: no apparent distress


HEENT: mucus membranes moist


Neck: supple neck


Cardiac: regular rate and rhythm


Lungs: normal breath sounds


Neuro: no lateralizing findings


Abdomen: active bowel sounds


Extremities: no edema


Skin: clear


Musculoskeletal: no pain





- Labs


Result Diagrams: 


                                 01/30/21 09:34





                                 01/30/21 09:34





- Telemetry


Sinus rhythms and dysrhythmias: sinus rhythm





- Assessment/Plan


Assessment/Plan: 





1. Acute blood loss anemia


2. Paroxysmal afib


3. CAD, stable


4. Hx of CABG


5. Viral CM with normalized EF since at 50-55%


6. Recent COVID 19 pneumonia


7. Hx of watchman device for vaginal bleeding


8. Bleeding ulcer on EGD today





PLAN:


- No anticoagulation due to anemia


- ASA only once safe from GI perspective.


- LISA's and PCD's for DVT prophylaixs


- Stop Digoxin due to mildly elevated level yesterday. May need to be restarted 

in next few weeks if she is having more Afib. 


- Restart Multaq


- Stop aldactone for now due to elevated K


- Will follow.

## 2021-01-30 NOTE — OP
DATE OF PROCEDURE:  01/30/2021



PROCEDURE PERFORMED:  Esophagogastroduodenoscopy with biopsy.



INDICATIONS FOR PROCEDURE:  Anemia, melena.



DESCRIPTION OF PROCEDURE:  After the risks and benefits of the procedure were

explained to the patient including risks of bleeding, infection, perforation,

reactions to anesthesia, aspiration, and/or pain, informed consent was obtained from

the patient's daughter (Janice Zheng).  The patient was then taken to the endoscopy

suite, where she was maneuvered into the left lateral decubitus position followed by

introduction of deep sedation via propofol and anesthesia support.  Once adequate

sedation was achieved, the standard gastroscope was introduced into the mouth with

intubation of the esophagus, stomach, and the proximal small intestines with the

findings listed below.  There were no perioperative complications.  Upon conclusion

of the procedure, all equipment was removed from the patient and she was transferred

to PACU in satisfactory condition. 



FINDINGS:  Esophagus:  Normal-appearing mucosa was seen in the proximal, mid, and

distal esophagus.  There was no evidence of erosions, ulcerations, mass lesions, or

active/recent bleeding. 



Stomach:  Normal-appearing mucosa was seen in the gastric cardia, fundus, body,

greater curvature, and incisura.  However, a 6 mm slightly cratered clean-based

ulceration was seen in the gastric antrum in the pre-pyloric region, but did not

display any high-risk stigmata of active or recent bleeding.  Increased mucosal

erythema and edema were seen surrounding the ulceration itself, but again no

evidence of mucosal breakdown.  Random biopsies were then taken from the antrum,

incisura, body and fundus for evaluation of possible H pylori status.  There was no

evidence of mass lesions or active/recent bleeding throughout the entire stomach. 



Duodenum:  Normal-appearing mucosa was seen in both the duodenal bulb and second

portion of the duodenum.  There was no evidence of erosions, ulcerations, mass

lesions, or active/recent bleeding. 



IMPRESSION:  

1. A 6 mm clean-based, slightly cratered ulceration seen in the gastric antrum,

likely the source of the patient's recent darker colored stools, status post

biopsies for possible H pylori. 

2. Mucosa surrounding the ulceration with slight erythema and edema, but no

additional ulcerations or mass lesions. 

3. Otherwise normal upper endoscopy.



RECOMMENDATIONS:  

1. Would continue to trend her H and H and transfuse as necessary to maintain an H

and H of 7/21. 

2. Continue to monitor clinically for signs of active GI bleeding.

3. Continue the patient on pantoprazole 40 mg IV b.i.d. for at least the next 30

days, then decrease to once daily. 

4. Would avoid any NSAIDs as they can potentially generate gastric ulcers.

5. Followup on biopsy results with treatment of H pylori if positive. 

Given the likelihood of rebleeding from this lesion at less than 5%, we will sign

off at this time.  Please call with any additional questions. 







Job ID:  [id number]

## 2021-01-30 NOTE — PDOC.HOSPP
- Subjective


Encounter Date: 01/30/21


Encounter Time: 11:20


Subjective: 


Patient up in bed no complaints.





- Objective


Vital Signs & Weight: 


                             Vital Signs (12 hours)











  Temp Pulse Resp BP Pulse Ox


 


 01/30/21 11:35  98.9 F  93  18  137/70  95


 


 01/30/21 07:00  98.3 F  94  19  149/62 H  97


 


 01/30/21 03:40  97.2 F L  87  20  131/58 L  100








                                     Weight











Weight                         165 lb 5.547 oz














I&O: 


                                        











 01/29/21 01/30/21 01/31/21





 06:59 06:59 06:59


 


Intake Total  50 


 


Output Total  600 


 


Balance  -550 











Result Diagrams: 


                                 01/31/21 04:10





                                 01/31/21 04:10


Additional Labs: 


                                   Accuchecks











  01/30/21 01/30/21





  11:49 05:35


 


POC Glucose  90  74














Hospitalist ROS





- Review of Systems


Cardiovascular: denies: chest pain, palpitations, orthopnea, paroxysmal noc. d

yspnea, edema, light headedness, other


Gastrointestinal: denies: nausea, vomiting, abdominal pain, diarrhea, 

constipation, melena, hematochezia, other


Genitourinary: denies: dysuria, frequency, incontinence, hematuria, retention, 

other





- Medication


Medications: 


Active Medications











Generic Name Dose Route Start Last Admin





  Trade Name Shanon  PRN Reason Stop Dose Admin


 


Gabapentin  200 mg  01/30/21 09:00  01/30/21 11:55





  Gabapentin 100 Mg Cap  PO   200 mg





  TID SILVIANO   Administration


 


Levofloxacin 500 mg/ Device  100 mls @ 100 mls/hr  01/30/21 11:00  01/30/21 

12:43





  IVPB   Not Given





  Q24HR SILVIANO  


 


Levothyroxine Sodium  112 mcg  01/30/21 06:00  01/30/21 06:16





  Levothyroxine Sodium 112 Mcg Tab  PO   112 mcg





  0600 SILVIANO   Administration


 


Pantoprazole Sodium  40 mg  01/29/21 21:00  01/30/21 11:55





  Pantoprazole 40 Mg Vial  IVP   40 mg





  Q12HR SILVIANO   Administration














Hospitalist Exam


Vitals: 


                             Vital Signs (12 hours)











  Temp Pulse Resp BP Pulse Ox


 


 01/30/21 11:35  98.9 F  93  18  137/70  95


 


 01/30/21 07:00  98.3 F  94  19  149/62 H  97


 


 01/30/21 03:40  97.2 F L  87  20  131/58 L  100








                                     Weight











Weight                         165 lb 5.547 oz














Neck: supple


Heart: RRR


Respiratory: no wheezes, no rales


Gastrointestinal: soft, non-tender


Extremities: 1+ LE edema





Hosp A/P


(1) Acute blood loss anemia


Code(s): D62 - ACUTE POSTHEMORRHAGIC ANEMIA   Status: Acute   





(2) Gastric ulcer


Code(s): K25.9 - GASTRIC ULCER, UNSP AS ACUTE OR CHRONIC, W/O HEMOR OR PERF   

Status: Acute   





(3) A-fib


Code(s): I48.91 - UNSPECIFIED ATRIAL FIBRILLATION   Status: Acute   





- Plan





Spoke with cardiology patient does have a watchman's procedure however unclear 

if this is intact.  She was on Eliquis 2.5 twice daily.  However this has been 

discontinued given her findings on the EGD.  We will continue to monitor 

patient.  Patient received 1 unit of PRBC on admission.  Her H&H has been 

stable.

## 2021-01-31 LAB
ANION GAP SERPL CALC-SCNC: 11 MMOL/L (ref 10–20)
BASOPHILS # BLD AUTO: 0 THOU/UL (ref 0–0.2)
BASOPHILS NFR BLD AUTO: 0.2 % (ref 0–1)
BUN SERPL-MCNC: 18 MG/DL (ref 9.8–20.1)
CALCIUM SERPL-MCNC: 7.5 MG/DL (ref 7.8–10.44)
CHLORIDE SERPL-SCNC: 100 MMOL/L (ref 98–107)
CO2 SERPL-SCNC: 29 MMOL/L (ref 23–31)
CREAT CL PREDICTED SERPL C-G-VRATE: 108 ML/MIN (ref 70–130)
EOSINOPHIL # BLD AUTO: 0.1 THOU/UL (ref 0–0.7)
EOSINOPHIL NFR BLD AUTO: 1.8 % (ref 0–10)
GLUCOSE SERPL-MCNC: 112 MG/DL (ref 83–110)
HGB BLD-MCNC: 9.7 G/DL (ref 12–16)
LYMPHOCYTES # BLD: 1.3 THOU/UL (ref 1.2–3.4)
LYMPHOCYTES NFR BLD AUTO: 15.7 % (ref 21–51)
MCH RBC QN AUTO: 27.3 PG (ref 27–31)
MCV RBC AUTO: 83.5 FL (ref 78–98)
MONOCYTES # BLD AUTO: 0.4 THOU/UL (ref 0.11–0.59)
MONOCYTES NFR BLD AUTO: 5.3 % (ref 0–10)
NEUTROPHILS # BLD AUTO: 6.2 THOU/UL (ref 1.4–6.5)
NEUTROPHILS NFR BLD AUTO: 77 % (ref 42–75)
PLATELET # BLD AUTO: 272 THOU/UL (ref 130–400)
POTASSIUM SERPL-SCNC: 3.4 MMOL/L (ref 3.5–5.1)
RBC # BLD AUTO: 3.56 MILL/UL (ref 4.2–5.4)
SODIUM SERPL-SCNC: 137 MMOL/L (ref 136–145)
WBC # BLD AUTO: 8 THOU/UL (ref 4.8–10.8)

## 2021-01-31 NOTE — PDOC.HOSPP
- Subjective


Encounter Date: 01/31/21


Encounter Time: 11:15


Subjective: 


 pt up in bed oriented to self only. 





- Objective


Vital Signs & Weight: 


                             Vital Signs (12 hours)











  Temp Pulse Resp BP Pulse Ox


 


 01/31/21 08:00  98.3 F  79  19  135/79  95


 


 01/31/21 07:35      97


 


 01/31/21 04:00  98.5 F  88  14  128/58 L  92 L








                                     Weight











Admit Weight                   165 lb


 


Weight                         165 lb 5.547 oz














I&O: 


                                        











 01/30/21 01/31/21 02/01/21





 06:59 06:59 06:59


 


Intake Total 50 120 


 


Output Total 600  


 


Balance -550 120 











Result Diagrams: 


                                 01/31/21 04:10





                                 01/31/21 04:10


Additional Labs: 


                                   Accuchecks











  01/31/21 01/30/21 01/30/21





  05:55 20:16 16:20


 


POC Glucose  126 H  152 H  165 H














Hospitalist ROS





- Review of Systems


Other: 





unable to obtain





- Medication


Medications: 


Active Medications











Generic Name Dose Route Start Last Admin





  Trade Name Shanon  PRN Reason Stop Dose Admin


 


Dronedarone  400 mg  01/30/21 17:00  01/31/21 10:14





  Dronedarone Hcl 400 Mg Tab  PO   Not Given





  BID-WM SILVIANO  


 


Gabapentin  200 mg  01/30/21 09:00  01/31/21 10:14





  Gabapentin 100 Mg Cap  PO   Not Given





  TID SILVIANO  


 


Levofloxacin 500 mg/ Device  100 mls @ 100 mls/hr  01/30/21 11:00  01/31/21 

13:48





  IVPB   100 mls





  Q24HR SILVIANO   Administration


 


Levothyroxine Sodium  112 mcg  01/30/21 06:00  01/31/21 06:26





  Levothyroxine Sodium 112 Mcg Tab  PO   112 mcg





  0600 SILVIANO   Administration


 


Magnesium Oxide  400 mg  01/31/21 09:00  01/31/21 10:14





  Magnesium Oxide 400 Mg Tab  PO   Not Given





  DAILY SILVIANO  


 


Pantoprazole Sodium  40 mg  01/29/21 21:00  01/31/21 10:15





  Pantoprazole 40 Mg Vial  IVP   Not Given





  Q12HR [facility]  














Hospitalist Exam


Vitals: 


                             Vital Signs (12 hours)











  Temp Pulse Resp BP Pulse Ox


 


 01/31/21 08:00  98.3 F  79  19  135/79  95


 


 01/31/21 07:35      97


 


 01/31/21 04:00  98.5 F  88  14  128/58 L  92 L








                                     Weight











Admit Weight                   165 lb


 


Weight                         165 lb 5.547 oz














Neck: supple


Respiratory: no wheezes, no rales


Gastrointestinal: soft, non-tender, normal bowel sounds


Extremities: 1+ LE edema





Hosp A/P


(1) Acute blood loss anemia


Code(s): D62 - ACUTE POSTHEMORRHAGIC ANEMIA   Status: Acute   





(2) Gastric ulcer


Code(s): K25.9 - GASTRIC ULCER, UNSP AS ACUTE OR CHRONIC, W/O HEMOR OR PERF   

Status: Acute   





(3) A-fib


Code(s): I48.91 - UNSPECIFIED ATRIAL FIBRILLATION   Status: Acute   





- Plan





Spoke with cardiology patient does have a watchman's procedure however unclear 

if this is intact.  She was on Eliquis 2.5 twice daily.  However this has been 

discontinued given her findings on the EGD.  We will continue to monitor 

patient.  Patient received 1 unit of PRBC on admission.  Her H&H has been 

stable.








1/31 pt up in bed doing well. HH is stable. will replace K. protonix oral. 

 called no answer left message. will discharge pt back to nursing home in

am.

## 2021-01-31 NOTE — PDOC.CPN
- Subjective


Date: 01/31/21


Time: 16:04


Interval history: 





No new issues. She feels back to her normal baseline. 





- Review of Systems


General: denies: fever/chills, weight/appetite/sleep changes, night sweats, 

fatigue


Respiratory: denies: cough, congestion, shortness of breath, exercise 

intolerance


Cardiovascular: denies: chest pain, palpitation, edema, paroxysmal nocturnal 

dyspnea, orthopnea


Gastrointestinal: denies: nausea, vomiting, diarrhea, constipation, abd pain, GI

bleeding


Musculoskeletal: denies: pain, tenderness, stiffness, swelling, arthriti

s/arthralgias


Neurological: denies: numbness, syncope, seizure, weakness





- Objective


Allergies/Adverse Reactions: 


                                    Allergies











Allergy/AdvReac Type Severity Reaction Status Date / Time


 


Anesthetics - Amide Type - Allergy   Verified 12/02/20 18:59





Select A     


 


Anesthetics - Miriam Type- Allergy   Verified 12/02/20 18:59





Parabens     


 


atorvastatin Allergy   Verified 01/29/21 16:17


 


codeine [Codeine] Allergy   Verified 01/29/21 16:16


 


esomeprazole Allergy   Verified 01/29/21 16:17


 


levofloxacin [From Levaquin] Allergy   Verified 01/29/21 16:16


 


simvastatin Allergy   Verified 01/29/21 16:17











Visit Medications: 


                               Current Medications





Acetaminophen (Acetaminophen 325 Mg Tab)  650 mg PO Q4H PRN


   PRN Reason: Headache/Fever/Mild Pain (1-3)


Acetaminophen (Acetaminophen 650 Mg Suppository)  650 mg [place] Q4H PRN


   PRN Reason: Headache/Fever/Mild Pain (1-3)


Albuterol Sulfate (Albuterol 200 Puff (6.7gm Inhaler))  1 puff INH Q6HR PRN


   PRN Reason: SOB &/or Wheezing


Dronedarone (Dronedarone Hcl 400 Mg Tab)  400 mg PO BID-[facility]


   Last Admin: 01/31/21 15:49 Dose:  400 mg


   Documented by: 


Gabapentin (Gabapentin 100 Mg Cap)  200 mg PO TID [facility]


   Last Admin: 01/31/21 10:14 Dose:  Not Given


   Documented by: 


Levofloxacin 500 mg/ Device  100 mls @ 100 mls/hr IVPB Q24HR [facility]


   Last Admin: 01/31/21 13:48 Dose:  100 mls


   Documented by: 


Levothyroxine Sodium (Levothyroxine Sodium 112 Mcg Tab)  112 mcg PO 0600 [facility]


   Last Admin: 01/31/21 06:26 Dose:  112 mcg


   Documented by: 


Magnesium Oxide (Magnesium Oxide 400 Mg Tab)  400 mg PO DAILY [facility]


   Last Admin: 01/31/21 10:14 Dose:  Not Given


   Documented by: 


Pantoprazole Sodium (Pantoprazole 40 Mg Vial)  40 mg IVP Q12HR [facility]


   Last Admin: 01/31/21 10:15 Dose:  Not Given


   Documented by: 


Potassium Chloride (Potassium Chloride 20 Meq Tab)  40 meq PO BID-WM [facility]


   Stop: 02/01/21 08:01


Sodium Chloride (Flush - Normal Saline 10 Ml Syringe)  10 ml IVF PRN PRN


   PRN Reason: Saline Flush








Vital Signs & Weight: 


                                   Vital Signs











  Temp Pulse Resp BP Pulse Ox


 


 01/31/21 08:00  98.3 F  79  19  135/79  95


 


 01/31/21 07:35      97








                                        











Admit Weight                   165 lb


 


Weight                         165 lb 5.547 oz

















- Physical Exam


General: no apparent distress


HEENT: mucus membranes moist


Neck: supple neck


Cardiac: regular rate and rhythm


Lungs: clear to auscultation


Neuro: grossly intact


Abdomen: active bowel sounds


Extremities: no edema


Skin: clear


Musculoskeletal: no pain





- Labs


Result Diagrams: 


                                 01/31/21 04:10





                                 01/31/21 04:10





- Telemetry


Sinus rhythms and dysrhythmias: sinus rhythm





- Assessment/Plan


Assessment/Plan: 





1. Acute blood loss anemia


2. Paroxysmal afib


3. CAD, stable


4. Hx of CABG


5. Viral CM with normalized EF since at 50-55%


6. Recent COVID 19 pneumonia


7. Hx of watchman device for vaginal bleeding


8. Bleeding ulcer on EGD yesterday


9. UTI, pseudomona on Urine Cx.





PLAN:


- No anticoagulation due to anemia and bleeding. 


- ASA 81 mg once safe from GI perspective.


- LISA's and PCD's for DVT prophylaxis


- No more dig.


- On Multaq


- Off aldactone due to elevated K


- CV stable otherwise.

## 2021-02-01 LAB
ANION GAP SERPL CALC-SCNC: 15 MMOL/L (ref 10–20)
BASOPHILS # BLD AUTO: 0 THOU/UL (ref 0–0.2)
BASOPHILS NFR BLD AUTO: 0.5 % (ref 0–1)
BUN SERPL-MCNC: 13 MG/DL (ref 9.8–20.1)
CALCIUM SERPL-MCNC: 8 MG/DL (ref 7.8–10.44)
CHLORIDE SERPL-SCNC: 98 MMOL/L (ref 98–107)
CO2 SERPL-SCNC: 28 MMOL/L (ref 23–31)
CREAT CL PREDICTED SERPL C-G-VRATE: 94 ML/MIN (ref 70–130)
EOSINOPHIL # BLD AUTO: 0.1 THOU/UL (ref 0–0.7)
EOSINOPHIL NFR BLD AUTO: 1.4 % (ref 0–10)
GLUCOSE SERPL-MCNC: 192 MG/DL (ref 83–110)
HGB BLD-MCNC: 9.8 G/DL (ref 12–16)
LYMPHOCYTES # BLD: 1.3 THOU/UL (ref 1.2–3.4)
LYMPHOCYTES NFR BLD AUTO: 18.7 % (ref 21–51)
MCH RBC QN AUTO: 26.8 PG (ref 27–31)
MCV RBC AUTO: 83.8 FL (ref 78–98)
MONOCYTES # BLD AUTO: 0.4 THOU/UL (ref 0.11–0.59)
MONOCYTES NFR BLD AUTO: 6.2 % (ref 0–10)
NEUTROPHILS # BLD AUTO: 5.2 THOU/UL (ref 1.4–6.5)
NEUTROPHILS NFR BLD AUTO: 73.3 % (ref 42–75)
PLATELET # BLD AUTO: 264 THOU/UL (ref 130–400)
POTASSIUM SERPL-SCNC: 3.5 MMOL/L (ref 3.5–5.1)
RBC # BLD AUTO: 3.68 MILL/UL (ref 4.2–5.4)
SODIUM SERPL-SCNC: 137 MMOL/L (ref 136–145)
WBC # BLD AUTO: 7.1 THOU/UL (ref 4.8–10.8)

## 2021-02-01 NOTE — PQF
CLINICAL DOCUMENTATION CLARIFICATION FORM:







Dear Dr. RICHI LEWIS                           Date: 2-1-21



Please exercise your independent, professional judgment in responding to the 
clarification form. 

Clinical indicators are provided on the bottom of this form for your review.



Please check appropriate box(es):

[  x] UTI  please specify if due to or related to (as applicable):            

              [x  ] Indwelling Gonzalez Cath

[  ] UTI Not related to Indwelling Cath

[  ] Other diagnosis ___________

[  ] Unable to determine



In addition, please specify:

Present on Admission (POA):  [  x] Yes             [  ] No             [  ] 
Unable to determine



For continuity of documentation, please document condition throughout progress 
notes and discharge summary.  Thank You.

To be completed by CDI/Coding staff for physician review: 



CLINICAL INDICATORS - SIGNS / SYMPTOMS / LABS    / RESULTS AND LOCATION IN MR:



H&P 1-29-21:   URINARY TRACT INFECTION,  UA GROSSLY POSITIVE,  PATIENT HAS 
INDWELLING CATHETER.  PATIENT IS FEBRILE .5,  WBC:   10.0,  PATIENT IS NOT
TACHYPNEIC.  WILL COVER WITH CEFTRIAXONE AND AWAIT URINE CULTURE.  REMOVE 
INDWELLING CATHETER



ER NOTES 1-29-21:   TEMP:   100.5,  100,  99



RISK FACTORS    / RESULTS AND LOCATION IN MR:



H&P 1-29-21:   URINARY TRACT INFECTION,  UA GROSSLY POSITIVE,  PATIENT HAS 
INDWELLING CATHETER.  



TREATMENT   / RESULTS AND LOCATION IN MR:



ER NOTES 1-29-21:   CEFTRIAXONE IV,  NS IVF



CDS Signature: Connei Chase           Phone #:   [phone]                 
    Date: 2-1-21



                 This is a permanent part of the Medical Record

[facility]

## 2021-02-01 NOTE — PDOC.CPN
- Subjective


Date: 02/01/21


Time: 14:23


Interval history: 





She is resting comfortably in her bed. No angina. no SOB. 





- Review of Systems


General: denies: fever/chills, weight/appetite/sleep changes, night sweats, 

fatigue


Respiratory: denies: cough, congestion, shortness of breath, exercise 

intolerance


Cardiovascular: denies: chest pain, palpitation, edema, paroxysmal nocturnal 

dyspnea, orthopnea


Gastrointestinal: denies: nausea, vomiting, diarrhea, constipation, abd pain, GI

bleeding


Musculoskeletal: denies: pain, tenderness, stiffness, swelling, arth

ritis/arthralgias


Neurological: denies: numbness, syncope, seizure, weakness





- Objective


Allergies/Adverse Reactions: 


                                    Allergies











Allergy/AdvReac Type Severity Reaction Status Date / Time


 


Anesthetics - Amide Type - Allergy   Verified 12/02/20 18:59





Select A     


 


Anesthetics - Miriam Type- Allergy   Verified 12/02/20 18:59





Parabens     


 


atorvastatin Allergy   Verified 01/29/21 16:17


 


codeine [Codeine] Allergy   Verified 01/29/21 16:16


 


esomeprazole Allergy   Verified 01/29/21 16:17


 


levofloxacin [From Levaquin] Allergy   Verified 01/29/21 16:16


 


simvastatin Allergy   Verified 01/29/21 16:17











Visit Medications: 


                               Current Medications





Acetaminophen (Acetaminophen 325 Mg Tab)  650 mg PO Q4H PRN


   PRN Reason: Headache/Fever/Mild Pain (1-3)


Acetaminophen (Acetaminophen 650 Mg Suppository)  650 mg DC Q4H PRN


   PRN Reason: Headache/Fever/Mild Pain (1-3)


Albuterol Sulfate (Albuterol 200 Puff (6.7gm Inhaler))  1 puff INH Q6HR PRN


   PRN Reason: SOB &/or Wheezing


Digoxin (Digoxin 0.125 Mg Tab)  0.125 mg PO DAILY [facility]


Digoxin (Digoxin 0.25 Mg Tab)  0.125 mg PO ONE [facility]


Diltiazem HCl (Diltiazem Cd 120 Mg Cap)  120 mg PO TID [facility]


Dronedarone (Dronedarone Hcl 400 Mg Tab)  400 mg PO BID-[facility]


   Last Admin: 02/01/21 08:08 Dose:  400 mg


   Documented by: 


Gabapentin (Gabapentin 100 Mg Cap)  200 mg PO TID [facility]


   Last Admin: 02/01/21 08:07 Dose:  200 mg


   Documented by: 


Levofloxacin 500 mg/ Device  100 mls @ 100 mls/hr IVPB Q24HR [facility]


   Last Admin: 02/01/21 11:30 Dose:  100 mls


   Documented by: 


Levothyroxine Sodium (Levothyroxine Sodium 112 Mcg Tab)  112 mcg PO 0600 [facility]


   Last Admin: 02/01/21 06:05 Dose:  112 mcg


   Documented by: 


Magnesium Oxide (Magnesium Oxide 400 Mg Tab)  400 mg PO DAILY [facility]


   Last Admin: 02/01/21 08:07 Dose:  400 mg


   Documented by: 


Pantoprazole Sodium (Pantoprazole 40 Mg Tab)  40 mg PO BID [facility]


Sodium Chloride (Flush - Normal Saline 10 Ml Syringe)  10 ml IVF PRN PRN


   PRN Reason: Saline Flush








Vital Signs & Weight: 


                                   Vital Signs











  Temp Pulse Pulse Pulse Resp BP BP


 


 02/01/21 09:55    84  91   132/86  133/76


 


 02/01/21 08:18       


 


 02/01/21 07:30       


 


 02/01/21 04:00  98.6 F  97    16  














  BP Pulse Ox


 


 02/01/21 09:55  


 


 02/01/21 08:18   93 L


 


 02/01/21 07:30   97


 


 02/01/21 04:00  133/71  93 L








                                        











Admit Weight                   165 lb


 


Weight                         165 lb 5.547 oz

















- Physical Exam


General: alert & oriented x3


HEENT: mucus membranes moist


Neck: supple neck


Cardiac: irregularly regular


Lungs: clear to auscultation


Neuro: grossly intact


Abdomen: active bowel sounds


Extremities: no edema


Skin: clear


Musculoskeletal: no pain





- Labs


Result Diagrams: 


                                 02/01/21 04:56





                                 02/01/21 04:56





- Telemetry


Supraventricular conduction: atrial fibrillation





- Assessment/Plan


Assessment/Plan: 





1. Acute blood loss anemia


2. Paroxysmal afib, currently RVR


3. CAD, stable


4. Hx of CABG


5. Viral CM with normalized EF since at 50-55%


6. Recent COVID 19 pneumonia


7. Hx of watchman device for vaginal bleeding


8. Bleeding ulcer on EGD yesterday


9. UTI, pseudomona on Urine Cx.





PLAN:


- No anticoagulation due to anemia and bleeding. 


- ASA 81 mg once safe from GI perspective.


- Will restart Dig and home dose of diltiazem. 


- Continue multaq at current dose.

## 2021-02-01 NOTE — EKG
Test Reason : 

Blood Pressure : ***/*** mmHG

Vent. Rate : 097 BPM     Atrial Rate : 097 BPM

   P-R Int : 220 ms          QRS Dur : 108 ms

    QT Int : 338 ms       P-R-T Axes : 038 -04 160 degrees

   QTc Int : 429 ms

 

Sinus rhythm with 1st degree A-V block

Low voltage QRS

Incomplete left bundle branch block





poor quality precludes adequate assessment of limb leads

Abnormal ECG

 

Confirmed by DR. TADEO REY (3) on 2/1/2021 5:19:40 PM

 

Referred By:  RUBIO           Confirmed By:DR. TADEO REY

## 2021-02-01 NOTE — PQF
CLINICAL DOCUMENTATION CLARIFICATION FORM:









Dear Dr. RICHI LEWIS                               Date: 2-1-21

Please exercise your independent, professional judgment in responding to the 
clarification form. 

Clinical indicators are provided on the bottom of this form for your review.



Please check appropriate box(es):

[  ] Encephalopathy:

   Type:      [ x ] Acute       [  ] Subacute       [  ] Chronic

   Etiology:            [ x ] Metabolic      [  ] Toxic      

                                 [  ] In the setting of underlying dementia 

                            [  ] Other (please specify) ______________

[  ] Transient Alteration of Awareness 

[  ] Other diagnosis ___________

[  ] Unable to determine



In addition, please specify:

Present on Admission (POA):  [ x ] Yes             [  ] No             [  ] 
Unable to determine



For continuity of documentation, please document condition throughout progress 
notes and discharge summary.  Thank You.



To be completed by CDI/Coding staff for physician review:

 CLINICAL INDICATORS - SIGNS / SYMPTOMS / LABS   / RESULTS AND LOCATION IN EMR:



CONSULT NOTE KATHLEEN 1-29-21:   SHE IS BACK HERE AS SHE WAS CONFUSED.  SHE HAS 
SOME LEVEL OF DEMENTIA,  SEEMS TO HAVE MOMENTS OF LUCIDITY AND THEN GOES ON A 
TANGENT AND REALIZED THAT SHE MAY NOT HAVE BEEN COMPREHENDING WHAT YOU WERE 
ASKING HER TO BEGIN WITH



H&P 1-29-21:   UPPER GI BLEED,  ELEVATED DIGOXIN LEVEL, UTI,  AJRUN,  
HYPERKALEMIA, HYPONATREMIA, A GIB,  DM2,  CAD,  HYPOTHYROIDISM

      

RISK FACTORS  / RESULTS AND LOCATION IN EMR:



H&P 1-29-21:   UPPER GI BLEED,  ELEVATED DIGOXIN LEVEL, UTI,  ARJUN,  
HYPERKALEMIA, HYPONATREMIA, A GIB,  DM2,  CAD,  HYPOTHYROIDISM

      

TREATMENTS  / RESULTS AND LOCATION IN EMR:



ER NOTES 2-1-21:   CEFTRIAXONE IV,  NS IVF



CDS Signature:   Connie Sheaberhane    Phone #:    [phone]        Date/Time: 
2-1-21



                 This is a permanent part of the Medical Record

[facility]

## 2021-02-02 VITALS — TEMPERATURE: 99 F | SYSTOLIC BLOOD PRESSURE: 126 MMHG | DIASTOLIC BLOOD PRESSURE: 65 MMHG

## 2021-02-02 LAB
ANION GAP SERPL CALC-SCNC: 14 MMOL/L (ref 10–20)
BASOPHILS # BLD AUTO: 0 THOU/UL (ref 0–0.2)
BASOPHILS NFR BLD AUTO: 0.3 % (ref 0–1)
BUN SERPL-MCNC: 12 MG/DL (ref 9.8–20.1)
CALCIUM SERPL-MCNC: 7.8 MG/DL (ref 7.8–10.44)
CHLORIDE SERPL-SCNC: 101 MMOL/L (ref 98–107)
CO2 SERPL-SCNC: 28 MMOL/L (ref 23–31)
CREAT CL PREDICTED SERPL C-G-VRATE: 94 ML/MIN (ref 70–130)
EOSINOPHIL # BLD AUTO: 0.2 THOU/UL (ref 0–0.7)
EOSINOPHIL NFR BLD AUTO: 2.5 % (ref 0–10)
GLUCOSE SERPL-MCNC: 159 MG/DL (ref 83–110)
HGB BLD-MCNC: 9.8 G/DL (ref 12–16)
LYMPHOCYTES # BLD: 1.6 THOU/UL (ref 1.2–3.4)
LYMPHOCYTES NFR BLD AUTO: 24 % (ref 21–51)
MCH RBC QN AUTO: 26.7 PG (ref 27–31)
MCV RBC AUTO: 85.1 FL (ref 78–98)
MONOCYTES # BLD AUTO: 0.5 THOU/UL (ref 0.11–0.59)
MONOCYTES NFR BLD AUTO: 7.2 % (ref 0–10)
NEUTROPHILS # BLD AUTO: 4.4 THOU/UL (ref 1.4–6.5)
NEUTROPHILS NFR BLD AUTO: 66 % (ref 42–75)
PLATELET # BLD AUTO: 230 THOU/UL (ref 130–400)
POTASSIUM SERPL-SCNC: 4.5 MMOL/L (ref 3.5–5.1)
RBC # BLD AUTO: 3.65 MILL/UL (ref 4.2–5.4)
SODIUM SERPL-SCNC: 138 MMOL/L (ref 136–145)
WBC # BLD AUTO: 6.6 THOU/UL (ref 4.8–10.8)

## 2021-02-06 ENCOUNTER — HOSPITAL ENCOUNTER (INPATIENT)
Dept: HOSPITAL 92 - ERS | Age: 76
LOS: 15 days | DRG: 682 | End: 2021-02-21
Attending: INTERNAL MEDICINE | Admitting: HOSPITALIST
Payer: MEDICARE

## 2021-02-06 VITALS — BODY MASS INDEX: 23.5 KG/M2

## 2021-02-06 DIAGNOSIS — Z87.891: ICD-10-CM

## 2021-02-06 DIAGNOSIS — E87.3: ICD-10-CM

## 2021-02-06 DIAGNOSIS — E11.22: ICD-10-CM

## 2021-02-06 DIAGNOSIS — I49.5: ICD-10-CM

## 2021-02-06 DIAGNOSIS — R62.7: ICD-10-CM

## 2021-02-06 DIAGNOSIS — T50.2X5A: ICD-10-CM

## 2021-02-06 DIAGNOSIS — I25.810: ICD-10-CM

## 2021-02-06 DIAGNOSIS — Z88.8: ICD-10-CM

## 2021-02-06 DIAGNOSIS — L89.153: ICD-10-CM

## 2021-02-06 DIAGNOSIS — R53.81: ICD-10-CM

## 2021-02-06 DIAGNOSIS — R13.10: ICD-10-CM

## 2021-02-06 DIAGNOSIS — Z95.1: ICD-10-CM

## 2021-02-06 DIAGNOSIS — I48.20: ICD-10-CM

## 2021-02-06 DIAGNOSIS — Z86.16: ICD-10-CM

## 2021-02-06 DIAGNOSIS — E83.42: ICD-10-CM

## 2021-02-06 DIAGNOSIS — D62: ICD-10-CM

## 2021-02-06 DIAGNOSIS — I46.8: ICD-10-CM

## 2021-02-06 DIAGNOSIS — Z20.822: ICD-10-CM

## 2021-02-06 DIAGNOSIS — B95.62: ICD-10-CM

## 2021-02-06 DIAGNOSIS — Z88.5: ICD-10-CM

## 2021-02-06 DIAGNOSIS — B33.24: ICD-10-CM

## 2021-02-06 DIAGNOSIS — Z79.82: ICD-10-CM

## 2021-02-06 DIAGNOSIS — N17.9: Primary | ICD-10-CM

## 2021-02-06 DIAGNOSIS — Z95.5: ICD-10-CM

## 2021-02-06 DIAGNOSIS — J96.01: ICD-10-CM

## 2021-02-06 DIAGNOSIS — I50.30: ICD-10-CM

## 2021-02-06 DIAGNOSIS — I13.0: ICD-10-CM

## 2021-02-06 DIAGNOSIS — K25.9: ICD-10-CM

## 2021-02-06 DIAGNOSIS — Z90.49: ICD-10-CM

## 2021-02-06 DIAGNOSIS — Z66: ICD-10-CM

## 2021-02-06 DIAGNOSIS — E87.6: ICD-10-CM

## 2021-02-06 DIAGNOSIS — E87.5: ICD-10-CM

## 2021-02-06 DIAGNOSIS — E03.9: ICD-10-CM

## 2021-02-06 DIAGNOSIS — E78.5: ICD-10-CM

## 2021-02-06 LAB
ALBUMIN SERPL BCG-MCNC: 2.4 G/DL (ref 3.4–4.8)
ALP SERPL-CCNC: 161 U/L (ref 40–110)
ALT SERPL W P-5'-P-CCNC: 12 U/L (ref 8–55)
ANALYZER IN CARDIO: (no result)
ANION GAP SERPL CALC-SCNC: 15 MMOL/L (ref 10–20)
ANION GAP SERPL CALC-SCNC: 18 MMOL/L (ref 10–20)
APTT PPP: 39.6 SEC (ref 22.9–36.1)
AST SERPL-CCNC: 13 U/L (ref 5–34)
BACTERIA UR QL AUTO: (no result) HPF
BASE EXCESS STD BLDA CALC-SCNC: -1.8 MEQ/L
BASOPHILS # BLD AUTO: 0 THOU/UL (ref 0–0.2)
BASOPHILS NFR BLD AUTO: 0.2 % (ref 0–1)
BILIRUB SERPL-MCNC: 0.5 MG/DL (ref 0.2–1.2)
BUN SERPL-MCNC: 42 MG/DL (ref 9.8–20.1)
BUN SERPL-MCNC: 81 MG/DL (ref 9.8–20.1)
CA-I BLDA-SCNC: 1.2 MMOL/L (ref 1.12–1.3)
CALCIUM SERPL-MCNC: 7.9 MG/DL (ref 7.8–10.44)
CALCIUM SERPL-MCNC: 7.9 MG/DL (ref 7.8–10.44)
CHLORIDE SERPL-SCNC: 91 MMOL/L (ref 98–107)
CHLORIDE SERPL-SCNC: 98 MMOL/L (ref 98–107)
CK SERPL-CCNC: 20 U/L (ref 29–168)
CO2 SERPL-SCNC: 23 MMOL/L (ref 23–31)
CO2 SERPL-SCNC: 23 MMOL/L (ref 23–31)
CREAT CL PREDICTED SERPL C-G-VRATE: 0 ML/MIN (ref 70–130)
CREAT CL PREDICTED SERPL C-G-VRATE: 0 ML/MIN (ref 70–130)
DIGOXIN SERPL-MCNC: 2.76 NG/ML (ref 0.8–2)
EOSINOPHIL # BLD AUTO: 0.2 THOU/UL (ref 0–0.7)
EOSINOPHIL NFR BLD AUTO: 1.8 % (ref 0–10)
GLOBULIN SER CALC-MCNC: 2.6 G/DL (ref 2.4–3.5)
GLUCOSE SERPL-MCNC: 126 MG/DL (ref 83–110)
GLUCOSE SERPL-MCNC: 83 MG/DL (ref 83–110)
GLUCOSE UR STRIP-MCNC: 50 MG/DL
HBSAG INDEX: 0.51 S/CO (ref 0–0.99)
HCO3 BLDA-SCNC: 22.4 MEQ/L (ref 22–28)
HCT VFR BLDA CALC: 32 % (ref 36–47)
HGB BLD-MCNC: 10.4 G/DL (ref 12–16)
HGB BLDA-MCNC: 11 G/DL (ref 12–16)
INR PPP: 1
LEUKOCYTE ESTERASE UR QL STRIP.AUTO: 500 LEU/UL
LIPASE SERPL-CCNC: (no result) U/L (ref 8–78)
LYMPHOCYTES # BLD: 1.3 THOU/UL (ref 1.2–3.4)
LYMPHOCYTES NFR BLD AUTO: 10.5 % (ref 21–51)
MCH RBC QN AUTO: 26.3 PG (ref 27–31)
MCV RBC AUTO: 84.4 FL (ref 78–98)
MONOCYTES # BLD AUTO: 1 THOU/UL (ref 0.11–0.59)
MONOCYTES NFR BLD AUTO: 8.2 % (ref 0–10)
NEUTROPHILS # BLD AUTO: 9.7 THOU/UL (ref 1.4–6.5)
NEUTROPHILS NFR BLD AUTO: 79.3 % (ref 42–75)
PCO2 BLDA: 36 MMHG (ref 35–45)
PH BLDA: 7.41 [PH] (ref 7.35–7.45)
PLATELET # BLD AUTO: 338 THOU/UL (ref 130–400)
PO2 BLDA: 83.9 MMHG (ref 70–?)
POTASSIUM BLD-SCNC: 6.2 MMOL/L (ref 3.7–5.3)
POTASSIUM SERPL-SCNC: 4.4 MMOL/L (ref 3.5–5.1)
POTASSIUM SERPL-SCNC: 6.7 MMOL/L (ref 3.5–5.1)
PROT UR STRIP.AUTO-MCNC: 30 MG/DL
PROTHROMBIN TIME: 13.4 SEC (ref 12–14.7)
RBC # BLD AUTO: 3.93 MILL/UL (ref 4.2–5.4)
SODIUM SERPL-SCNC: 125 MMOL/L (ref 136–145)
SODIUM SERPL-SCNC: 132 MMOL/L (ref 136–145)
SP GR UR STRIP: 1.01 (ref 1–1.04)
SPECIMEN DRAWN FROM PATIENT: (no result)
WBC # BLD AUTO: 12.2 THOU/UL (ref 4.8–10.8)

## 2021-02-06 PROCEDURE — 87186 SC STD MICRODIL/AGAR DIL: CPT

## 2021-02-06 PROCEDURE — 94003 VENT MGMT INPAT SUBQ DAY: CPT

## 2021-02-06 PROCEDURE — 80053 COMPREHEN METABOLIC PANEL: CPT

## 2021-02-06 PROCEDURE — 92953 TEMPORARY EXTERNAL PACING: CPT

## 2021-02-06 PROCEDURE — 94660 CPAP INITIATION&MGMT: CPT

## 2021-02-06 PROCEDURE — 85730 THROMBOPLASTIN TIME PARTIAL: CPT

## 2021-02-06 PROCEDURE — 87040 BLOOD CULTURE FOR BACTERIA: CPT

## 2021-02-06 PROCEDURE — 81003 URINALYSIS AUTO W/O SCOPE: CPT

## 2021-02-06 PROCEDURE — S0028 INJECTION, FAMOTIDINE, 20 MG: HCPCS

## 2021-02-06 PROCEDURE — 87086 URINE CULTURE/COLONY COUNT: CPT

## 2021-02-06 PROCEDURE — 84484 ASSAY OF TROPONIN QUANT: CPT

## 2021-02-06 PROCEDURE — 31500 INSERT EMERGENCY AIRWAY: CPT

## 2021-02-06 PROCEDURE — 85610 PROTHROMBIN TIME: CPT

## 2021-02-06 PROCEDURE — 96366 THER/PROPH/DIAG IV INF ADDON: CPT

## 2021-02-06 PROCEDURE — 84100 ASSAY OF PHOSPHORUS: CPT

## 2021-02-06 PROCEDURE — 36556 INSERT NON-TUNNEL CV CATH: CPT

## 2021-02-06 PROCEDURE — 93005 ELECTROCARDIOGRAM TRACING: CPT

## 2021-02-06 PROCEDURE — 96375 TX/PRO/DX INJ NEW DRUG ADDON: CPT

## 2021-02-06 PROCEDURE — 36600 WITHDRAWAL OF ARTERIAL BLOOD: CPT

## 2021-02-06 PROCEDURE — 86850 RBC ANTIBODY SCREEN: CPT

## 2021-02-06 PROCEDURE — 51702 INSERT TEMP BLADDER CATH: CPT

## 2021-02-06 PROCEDURE — 80048 BASIC METABOLIC PNL TOTAL CA: CPT

## 2021-02-06 PROCEDURE — 81015 MICROSCOPIC EXAM OF URINE: CPT

## 2021-02-06 PROCEDURE — 86900 BLOOD TYPING SEROLOGIC ABO: CPT

## 2021-02-06 PROCEDURE — 82977 ASSAY OF GGT: CPT

## 2021-02-06 PROCEDURE — 96368 THER/DIAG CONCURRENT INF: CPT

## 2021-02-06 PROCEDURE — 85027 COMPLETE CBC AUTOMATED: CPT

## 2021-02-06 PROCEDURE — 5A1955Z RESPIRATORY VENTILATION, GREATER THAN 96 CONSECUTIVE HOURS: ICD-10-PCS | Performed by: INTERNAL MEDICINE

## 2021-02-06 PROCEDURE — C9113 INJ PANTOPRAZOLE SODIUM, VIA: HCPCS

## 2021-02-06 PROCEDURE — 94002 VENT MGMT INPAT INIT DAY: CPT

## 2021-02-06 PROCEDURE — 94640 AIRWAY INHALATION TREATMENT: CPT

## 2021-02-06 PROCEDURE — G0257 UNSCHED DIALYSIS ESRD PT HOS: HCPCS

## 2021-02-06 PROCEDURE — 36416 COLLJ CAPILLARY BLOOD SPEC: CPT

## 2021-02-06 PROCEDURE — 82550 ASSAY OF CK (CPK): CPT

## 2021-02-06 PROCEDURE — 85025 COMPLETE CBC W/AUTO DIFF WBC: CPT

## 2021-02-06 PROCEDURE — 74176 CT ABD & PELVIS W/O CONTRAST: CPT

## 2021-02-06 PROCEDURE — 87077 CULTURE AEROBIC IDENTIFY: CPT

## 2021-02-06 PROCEDURE — 82805 BLOOD GASES W/O2 SATURATION: CPT

## 2021-02-06 PROCEDURE — 85007 BL SMEAR W/DIFF WBC COUNT: CPT

## 2021-02-06 PROCEDURE — 87340 HEPATITIS B SURFACE AG IA: CPT

## 2021-02-06 PROCEDURE — 83605 ASSAY OF LACTIC ACID: CPT

## 2021-02-06 PROCEDURE — 86901 BLOOD TYPING SEROLOGIC RH(D): CPT

## 2021-02-06 PROCEDURE — 36415 COLL VENOUS BLD VENIPUNCTURE: CPT

## 2021-02-06 PROCEDURE — 71045 X-RAY EXAM CHEST 1 VIEW: CPT

## 2021-02-06 PROCEDURE — P9047 ALBUMIN (HUMAN), 25%, 50ML: HCPCS

## 2021-02-06 PROCEDURE — 5A12012 PERFORMANCE OF CARDIAC OUTPUT, SINGLE, MANUAL: ICD-10-PCS | Performed by: INTERNAL MEDICINE

## 2021-02-06 PROCEDURE — 87205 SMEAR GRAM STAIN: CPT

## 2021-02-06 PROCEDURE — 92950 HEART/LUNG RESUSCITATION CPR: CPT

## 2021-02-06 PROCEDURE — 96376 TX/PRO/DX INJ SAME DRUG ADON: CPT

## 2021-02-06 PROCEDURE — 0BH17EZ INSERTION OF ENDOTRACHEAL AIRWAY INTO TRACHEA, VIA NATURAL OR ARTIFICIAL OPENING: ICD-10-PCS | Performed by: INTERNAL MEDICINE

## 2021-02-06 PROCEDURE — 87070 CULTURE OTHR SPECIMN AEROBIC: CPT

## 2021-02-06 PROCEDURE — 96365 THER/PROPH/DIAG IV INF INIT: CPT

## 2021-02-06 PROCEDURE — 0240U: CPT

## 2021-02-06 PROCEDURE — 90935 HEMODIALYSIS ONE EVALUATION: CPT

## 2021-02-06 PROCEDURE — 83880 ASSAY OF NATRIURETIC PEPTIDE: CPT

## 2021-02-06 PROCEDURE — 93010 ELECTROCARDIOGRAM REPORT: CPT

## 2021-02-06 PROCEDURE — 83735 ASSAY OF MAGNESIUM: CPT

## 2021-02-06 PROCEDURE — 5A1D70Z PERFORMANCE OF URINARY FILTRATION, INTERMITTENT, LESS THAN 6 HOURS PER DAY: ICD-10-PCS | Performed by: INTERNAL MEDICINE

## 2021-02-06 PROCEDURE — 96367 TX/PROPH/DG ADDL SEQ IV INF: CPT

## 2021-02-06 PROCEDURE — 80162 ASSAY OF DIGOXIN TOTAL: CPT

## 2021-02-06 PROCEDURE — 83690 ASSAY OF LIPASE: CPT

## 2021-02-06 NOTE — PDOC.DS.DS
Provider


Date of Admission: 


01/29/21 13:50





Date of Discharge: 02/01/21


Admitting Provider: 


Shola Myers MD





Consultations: Cardiology, Gastroentrology


Primary Care Physician: 


Unknown








Course


Hospital Course: 


Is a 75-year-old female who initially presented to the hospital with abnormal 

labs.  Patient has a history of on Eliquis was noted to have low H&H and was 

found to have dark stools.  Patient at this time was seen by GI she underwent a 

EGD which indicated a 6 mm clean base greater.  Patient at this time was 

continued on PPI and recommended against using any sets.  Patient also seen by 

cardiology.  Recommended holding off anticoagulation.  Recommended to restart 

Multaq patient's Aldactone was discontinued due to elevated potassium.  Patient 

has had watchman's procedure in the past however unclear if it is patent.  She 

was restarted on aspirin 81 mg.  Her digoxin and diltiazem were also started.


Lab Results: 


                                        





                                 02/02/21 04:12 





                                 02/02/21 04:12 





Microbiology - Entire Visit





01/29/21 13:54   Venous blood - Left Arm   Blood Culture - Final


                            NO GROWTH IN 5 DAYS


01/29/21 13:38   Venous blood - Right Arm   Blood Culture - Final


                            NO GROWTH IN 5 DAYS


01/29/21 12:10   Urine tapia catheter   Urine Culture - Final


                            Pseudomonas aeruginosa


                            Enterococcus faecalis


01/29/21 10:55   Stool - Pending    - Final








Vitals: 


                                     Weight











Admit Weight                   165 lb


 


Weight                         161 lb 6 oz














Physical Exam: 


The patient was seen and examined on the day of discharge.








Problem


Assessment: 


(1) Acute blood loss anemia


Code(s): D62 - ACUTE POSTHEMORRHAGIC ANEMIA   Status: Acute   





(2) Gastric ulcer


Code(s): K25.9 - GASTRIC ULCER, UNSP AS ACUTE OR CHRONIC, W/O HEMOR OR PERF   

Status: Acute   





(3) A-fib


Code(s): I48.91 - UNSPECIFIED ATRIAL FIBRILLATION   Status: Acute   





(1) Acute blood loss anemia


Code(s): D62 - ACUTE POSTHEMORRHAGIC ANEMIA   Status: Acute   





(2) Gastric ulcer


Code(s): K25.9 - GASTRIC ULCER, UNSP AS ACUTE OR CHRONIC, W/O HEMOR OR PERF   

Status: Acute   





(3) A-fib


Code(s): I48.91 - UNSPECIFIED ATRIAL FIBRILLATION   Status: Acute   





Plan


Prescriptions: 


Levofloxacin [Levaquin] 500 mg PO DAILY #4 tablet


Dronedarone HCl [Multaq] 400 mg PO BID-WM #60 tab


Spironolactone 12.5 mg PO DAILY #30 tablet


Home Medications: 


                                        











 Medication  Instructions  Recorded  Confirmed  Type


 


Acetaminophen [Tylenol 8 Hour] 650 mg PO Q6HR PRN 01/29/21 01/29/21 History


 


Albuterol Sulfate [Albuterol 0.63 mg NEB Q4HR PRN 01/29/21 01/29/21 History





Sulfate Neb]    


 


Albuterol Sulfate [Albuterol 0.63 mg NEB Q6HR PRN 01/29/21 01/29/21 History





Sulfate Neb]    


 


Albuterol Sulfate [Ventolin HFA] 1 puff INH Q6HR PRN 01/29/21 01/29/21 History


 


Alogliptin Benzoate [Nesina] 25 mg PO DAILY 01/29/21 01/29/21 History


 


Aspirin [Aspirin EC] 81 mg PO DAILY 01/29/21 01/29/21 History


 


Budesonide [Pulmicort] 0.5 mg IH Q12HR PRN 01/29/21 01/29/21 History


 


Digoxin [Lanoxin] 0.125 mg PO DAILY 01/29/21 01/29/21 History


 


Diltiazem HCl 120 mg PO TID 01/29/21 01/29/21 History


 


Furosemide 40 mg PO DAILY 01/29/21 01/29/21 History


 


Gabapentin 200 mg PO TID 01/29/21 01/29/21 History


 


Insulin Glargine,Hum.Rec.Anlog 20 unit SQ HS 01/29/21 01/29/21 History





[Basaglar Kwikpen U-100]    


 


Levothyroxine Sodium 125 mcg PO DAILY 01/29/21 01/29/21 History





[Levothyroxine]    


 


Losartan [Cozaar] 12.5 mg PO DAILY 01/29/21 01/31/21 History


 


Magnesium Oxide [Magnesium] 400 mg PO DAILY 01/29/21 01/29/21 History


 


Metolazone [Zaroxolyn] 5 mg PO DAILY 01/29/21 01/29/21 History


 


Polyethylene Glycol 3350 [Miralax] 17 gm PO Q12HR PRN 01/29/21 01/29/21 History


 


Potassium Chloride 20 meq PO DAILY 01/29/21 01/29/21 History


 


Sennosides [Senokot] 1 tab PO DAILY PRN 01/29/21 01/29/21 History


 


metFORMIN [Glucophage] 500 mg PO BID-WM 01/29/21 01/29/21 History


 


traMADol HCl [Tramadol HCl] 50 mg PO Q6HR PRN 01/29/21 01/29/21 History


 


Argin/Glut/Cahmb/Collag/Mv-Min 1 each PO BID 01/31/21 01/31/21 History





[Ajay Packet]    


 


HumaLOG [HumaLOG Vial] 0 unit SC ACHS PRN 01/31/21 01/31/21 History


 


Dronedarone HCl [Multaq] 400 mg PO BID-WM #60 tab 02/02/21  Rx


 


Levofloxacin [Levaquin] 500 mg PO DAILY #4 tablet 02/02/21  Rx


 


Pantoprazole [Protonix] 40 mg PO BID  tab 02/02/21  Rx


 


Spironolactone 12.5 mg PO DAILY #30 tablet 02/02/21  Rx











Allergies: 








Anesthetics - Amide Type - Select A Allergy (Verified 12/02/20 18:59)


   


Anesthetics - Miriam Type- Parabens Allergy (Verified 12/02/20 18:59)


   


atorvastatin Allergy (Verified 01/29/21 16:17)


   


   from SNF notes 


codeine [Codeine] Allergy (Verified 01/29/21 16:16)


   


   from SNF notes 


esomeprazole Allergy (Verified 01/29/21 16:17)


   


   from SNF notes 


levofloxacin [From Levaquin] Allergy (Verified 01/29/21 16:16)


   


   from SNF notes 


simvastatin Allergy (Verified 01/29/21 16:17)


   


   from SNF notes 





Discharge Instructions:: 


PROTONIX TWICE A DAY FOR THE NEXT 30 DAYS THEN CHANGE TO DAILY.





PLEASE MONITOR MAGNESIUM AND EKG FOR PROLONG QTC GIVEN THAT PT IS ON LEVAQUIN. 


Activity:: Activity as Tolerated


Nourishment:: Heart Healthy Diet


Referrals: 


[facility] [Outside] (Returning to skilled nursing 

placement.)


Blanche Valadez MD [Active] -  (Following pcp at the [facility] skilled 

nursing facility.)


Disposition: SKILLED NURSING FACILITY





Quality


CORE MEASURES:: N/A

## 2021-02-06 NOTE — RAD
Chest AP view



INDICATION: Weakness with history of Covid



COMPARISON: December 2, 2020 and December 4, 2020



FINDINGS:



Lungs:  There are worsening interstitial and airspace opacity seen within the right lower lobe and le
ft lower lobe.



Cardiac silhouette:  There is moderate cardiomegaly. There is an atrial appendage closure device seen
 overlying the left heart border. Midline sternotomy changes are stable.



Pulmonary vasculature:  Normal



Pleural spaces:  No pleural effusion or pneumothorax is demonstrated.



Upper abdomen:  No abnormality seen.



Osseous structures:  No acute osseous abnormality.



Additional findings:  None.



IMPRESSION: 

Worsening bibasilar infiltrates suspicious for bilateral lower lobe pneumonia.



Reported By: Tadeo Arauz 

Electronically Signed:  2/6/2021 2:45 PM

## 2021-02-06 NOTE — RAD
RADIOGRAPH CHEST 1 VIEW:



DATE:

2/6/2021



TIME:

5:14 PM



HISTORY:

75-year-old female in respiratory failure status post intubation. Borderline hypotensive



COMPARISON:

2/6/2021

2:29 PM



FINDINGS:

 Supine positioning makes this study insensitive for the detection of pneumothorax.

New endotracheal tuber 2.5 cm superior to keysha.

New esophageal tube coursing inferior to diaphragm, with side-port and distal tip outside of field of
 view.

Consolidation at right medial base partially obscured by cardiac shadow unchanged.

Platelike horizontally oriented opacity at right lower lung zone, unchanged.

Defibrillation paddle partially obscures lateral aspect of right chest, including an apparently new s
mall dense opacity abutting the right upper pleural surface.

Prominent interstitial markings.

Mild infiltrate at left base.



IMPRESSION:

1) status post intubation with endotracheal tube and esophagogastric tube.

2) new right apical pleural-based patchy opacity.

3) no significant interval change in the other bibasilar pulmonary opacities.



Reported By: Aidan Saucedo 

Electronically Signed:  2/6/2021 5:31 PM

## 2021-02-06 NOTE — PDOC.DS.DS
Provider


Date of Admission: 


01/29/2021  15:10





Date of Discharge: 02/01/21


Admitting Provider: 


Diamond De Jesus MD





Primary Care Physician: 


Unknown








Course


Hospital Course: 


Is a 75-year-old female who initially presented to the hospital with abnormal 

labs.  Patient has a history of on Eliquis was noted to have low H&H and was 

found to have dark stools.  Patient at this time was seen by GI she underwent a 

EGD which indicated a 6 mm clean base greater.  Patient at this time was 

continued on PPI and recommended against using any sets.  Patient also seen by 

cardiology.  Recommended holding off anticoagulation.  Recommended to restart 

Multaq patient's Aldactone was discontinued due to elevated potassium.  Patient 

has had watchman's procedure in the past however unclear if it is patent.  She 

was restarted on aspirin 81 mg.  Her digoxin and diltiazem were also started.


Resuscitation Status: 








02/06/21 16:54


Resuscitation Status Routine 


   Resuscitation Status: FULL: Full Resuscitation








Lab Results: 


                                        





                                 02/06/21 14:22 





                                 02/06/21 14:22 





Abnormal Lab Results - Last 48 hrs





02/06/21 14:22: Sodium 125 L, Potassium 6.7 H*, Chloride 91 L, BUN 81 H, 

Creatinine 1.64 H, Alkaline Phosphatase 161 H, Creatine Kinase 20 L, Serum Total

Protein 5.0 L, Albumin 2.4 L, Albumin/Globulin Ratio 0.9 L, Lipase Less than  4 

L


02/06/21 14:22: WBC 12.2 H, RBC 3.93 L, Hgb 10.4 L, Hct 33.2 L, MCH 26.3 L, MCHC

31.2 L, RDW 15.8 H, Neutrophils % 79.3 H, Lymphocytes % 10.5 L, Neutrophils # 

9.7 H, Monocytes # 1.0 H


02/06/21 14:22: Lactic Acid 4.5 H*


02/06/21 14:22: APTT 39.6 H


02/06/21 14:22: Digoxin 2.76 H*


02/06/21 16:33: Urine Clarity Extra Turbid A, Urine Protein 30 A, Urine Blood 

Trace A, Ur Leukocyte Esterase 500 A, Urine RBC Greater than 50 A, Urine WBC 

Greater than 50 A, Urine Bacteria 4+ A


02/06/21 16:46: ABG pO2 83.9 H, ABG O2 Content 15.0 L, ABG Hematocrit 32.0 L, 

ABG Hemoglobin 11.0 L, ABG Deoxyhemoglobin 3.3 H, A-a O2 Gradient 584.100 H, 

Sodium 125 L, Potassium 6.20 H, Chloride 95 L


02/06/21 17:32: Lactic Acid 4.5 H*








Physical Exam: 


The patient was seen and examined on the day of discharge.








Problem


Assessment: 


(1) Acute blood loss anemia


Code(s): D62 - ACUTE POSTHEMORRHAGIC ANEMIA   Status: Acute   





(2) Gastric ulcer


Code(s): K25.9 - GASTRIC ULCER, UNSP AS ACUTE OR CHRONIC, W/O HEMOR OR PERF   

Status: Acute   





(3) A-fib


Code(s): I48.91 - UNSPECIFIED ATRIAL FIBRILLATION   Status: Acute   





Plan


Home Medications: 


                                        











 Medication  Instructions  Recorded  Confirmed  Type


 


Acetaminophen [Tylenol 8 Hour] 650 mg PO Q6HR PRN 01/29/21 01/29/21 History


 


Albuterol Sulfate [Albuterol 0.63 mg NEB Q4HR PRN 01/29/21 01/29/21 History





Sulfate Neb]    


 


Albuterol Sulfate [Albuterol 0.63 mg NEB Q6HR PRN 01/29/21 01/29/21 History





Sulfate Neb]    


 


Albuterol Sulfate [Ventolin HFA] 1 puff INH Q6HR PRN 01/29/21 01/29/21 History


 


Alogliptin Benzoate [Nesina] 25 mg PO DAILY 01/29/21 01/29/21 History


 


Aspirin [Aspirin EC] 81 mg PO DAILY 01/29/21 01/29/21 History


 


Budesonide [Pulmicort] 0.5 mg IH Q12HR PRN 01/29/21 01/29/21 History


 


Digoxin [Lanoxin] 0.125 mg PO DAILY 01/29/21 01/29/21 History


 


Diltiazem HCl 120 mg PO TID 01/29/21 01/29/21 History


 


Furosemide 40 mg PO DAILY 01/29/21 01/29/21 History


 


Gabapentin 200 mg PO TID 01/29/21 01/29/21 History


 


Insulin Glargine,Hum.Rec.Anlog 20 unit SQ HS 01/29/21 01/29/21 History





[Basaglar Kwikpen U-100]    


 


Levothyroxine Sodium 125 mcg PO DAILY 01/29/21 01/29/21 History





[Levothyroxine]    


 


Losartan [Cozaar] 12.5 mg PO DAILY 01/29/21 01/31/21 History


 


Magnesium Oxide [Magnesium] 400 mg PO DAILY 01/29/21 01/29/21 History


 


Metolazone [Zaroxolyn] 5 mg PO DAILY 01/29/21 01/29/21 History


 


Polyethylene Glycol 3350 [Miralax] 17 gm PO Q12HR PRN 01/29/21 01/29/21 History


 


Potassium Chloride 20 meq PO DAILY 01/29/21 01/29/21 History


 


Sennosides [Senokot] 1 tab PO DAILY PRN 01/29/21 01/29/21 History


 


metFORMIN [Glucophage] 500 mg PO BID-WM 01/29/21 01/29/21 History


 


traMADol HCl [Tramadol HCl] 50 mg PO Q6HR PRN 01/29/21 01/29/21 History


 


Argin/Glut/Cahmb/Collag/Mv-Min 1 each PO BID 01/31/21 01/31/21 History





[Ajay Packet]    


 


HumaLOG [HumaLOG Vial] 0 unit SC ACHS PRN 01/31/21 01/31/21 History


 


Dronedarone HCl [Multaq] 400 mg PO BID- #60 tab 02/02/21  Rx


 


Levofloxacin [Levaquin] 500 mg PO DAILY #4 tablet 02/02/21  Rx


 


Pantoprazole [Protonix] 40 mg PO BID  tab 02/02/21  Rx


 


Spironolactone 12.5 mg PO DAILY #30 tablet 02/02/21  Rx











Allergies: 








Anesthetics - Amide Type - Select A Allergy (Verified 12/02/20 18:59)


   


Anesthetics - Miriam Type- Parabens Allergy (Verified 12/02/20 18:59)


   


atorvastatin Allergy (Verified 01/29/21 16:17)


   


   from SNF notes 


codeine [Codeine] Allergy (Verified 01/29/21 16:16)


   


   from SNF notes 


esomeprazole Allergy (Verified 01/29/21 16:17)


   


   from SNF notes 


levofloxacin [From Levaquin] Allergy (Verified 01/29/21 16:16)


   


   from SNF notes 


simvastatin Allergy (Verified 01/29/21 16:17)


   


   from SNF notes 





Referrals: 


Unknown,Unknown [Primary Care Provider] - 


Disposition: HOME





Quality


CORE MEASURES:: N/A

## 2021-02-06 NOTE — HP
CHIEF COMPLAINT:  Nausea and weakness.



HISTORY OF PRESENT ILLNESS:  It appears the patient is a very pleasant 75-year-old

female who had just discharged from the hospital for anemia.  She initially

underwent an endoscopy, which indicated she had a 6 mm clean based slightly created

ulceration that was seen in the gastric antrum.  At this time, her H and H continued

to be stable and she was then discharged home.  She was also seen by Cardiology and

her medications were adjusted accordingly. 



However, today she presented to the hospital for weakness and nausea and when the ER

physician turned her to look at her decubitus sore, she at that time became

unresponsive.  At this time, CPR was started.  The patient was intubated.  It was

noted that the patient's potassium was 6.7.  At this time, she was given 3 to 4 amps

of calcium gluconate, bicarb was given insulin with D50 and ROSC was obtained very

quickly.  The patient also was noted to be significantly bradycardic.  She was noted

to have elevated digoxin level and was paced by the ER. 



PAST MEDICAL HISTORY:  She has a history of atrial fibrillation, currently off

anticoagulation due to her current GI bleed.  She has a history of coronary artery

disease status post CABG, type 2 diabetes, hypertension, hyperlipidemia.  She had a

COVID infection back in December 2020. 



PAST SURGICAL HISTORY:  She has had a CABG, angioplasty, and stent placement.



FAMILY HISTORY:  No significant history of heart disease or stroke.



SOCIAL HISTORY:  She is a former smoker, quit 10 years ago.  No alcohol use or drug

use. 



REVIEW OF SYSTEMS:  Unable to obtain.  The patient is currently intubated.



PHYSICAL EXAMINATION:

VITAL SIGNS:  As of the following; temperature of 97.6, pulse of 71, 101/53, 100%.

She is currently on the ventilator, respirations of 20. 

GENERAL:  She is intubated. 

HEENT:  Pupils are equal and reactive to light. 

CV:  S1 and S2 present, currently in sinus. 

LUNGS:  Clear to auscultation.  No rhonchi or wheezes noted. 

ABDOMEN:  Soft and nontender.  Bowel sounds are present x2. 

EXTREMITIES:  Mild 1 to 2+ lower extremity pitting edema. 

NEUROLOGIC:  Pupils are equal and reactive bilaterally.  Again, she is currently

sedated, unable to do a further neuro exam. 



LABORATORY DATA:  Her EKG had significantly wide QRS rhythm and she was noted to be

bradycardic.  WBCs of 12.2, hemoglobin of 10.4, hematocrit of 33.2, platelets of

15.8.  Chemistries; sodium of 125, potassium of 6.7, BUN of 81, creatinine 1.64.

Her lactic was 4.5.  Her glucose is 127.  Troponin is mildly elevated at 0.011.  Her

urine indicates a leukocyte esterase of 500, wbc's of greater than 50.  Her digoxin

level was 2.76.  She did have a chest x-ray, which indicated new apical opacities,

no significant interval changes in the bibasilar pulmonary opacities. 



ASSESSMENT AND PLAN:  The patient is a 75-year-old female, who initially presented

to the hospital with weakness and nausea, who lost her pulse and was asystole and

CPR was started. 

1. Acute respiratory failure.  She was intubated.  We will start her on some

broad-spectrum antibiotics and continue to monitor. 

2. Bradycardia.  The patient's digoxin level is significantly elevated.  This could

be from her __________ toxicity versus hyperkalemia.  I will stop the digoxin.  We

will check it again.  She is currently getting dialyzed.  We will continue to

monitor.  We will also get Cardiology to see this patient.  She is also on Multaq,

which I will hold and on Cardizem. 

3. Sepsis, unclear source at this time.  It could be pneumonia versus urinary tract

infection.  We will start her on broad-spectrum antibiotics.  I will also go ahead

and scan her abdomen and pelvis just to rule out any other abnormalities. 

4. Acute blood loss anemia.  This was previously, currently her H and H are stable.

We will continue to monitor.  We will continue Protonix. 

5. Deep venous thrombosis prophylaxis.  We will just put her on some SCDs for

tonight and see how her H and H __________.  She had a recent bleed. 

6. Hypothyroidism.  We will continue her medications.

7. Acute kidney injury.  Again, we will continue to monitor closely and Nephrology

has been consulted. 

8. Hyperkalemia.  The patient is currently getting dialyzed.  She also received

calcium gluconate.  We will also check another BMP after dialysis. 

9. Profound bradycardia.  She is currently paced.  We will also consult Cardiology

and Pulmonology also has been consulted for her respiratory status. 







Job ID:  [id number]

## 2021-02-06 NOTE — CT
CT OF THE ABDOMEN AND PELVIS WITHOUT IV CONTRAST:

2/6/21

 

INDICATION:

75-year-old female with history of abdominal pain. 

 

COMPARISON: 

Prior CT of the abdomen and pelvis with contrast study of 12/2/20. 

 

FINDINGS: 

There is air space consolidation in portions of the right middle lobe and right lower lobe suspicious
 for pneumonia. There is mild subsegmental volume loss involving the left lung base. There is small b
ilateral pleural effusions. There is a gastric catheter in the gastric body. 

 

The gallbladder is surgically absent. Unopacified liver, pancreas, and adrenal glands are unremarkabl
e. There is a small calcified granuloma within the spleen. 

 

Exophytic lesion off the lateral margin of the left kidney is unchanged. No hydronephrosis is evident
. 

 

There is severe vascular calcification involving the abdominal and pelvic vasculature. 

 

There is moderate amount of retained stool within the colon. Small bowel is 

of normal caliber. The visualized stomach is unremarkable appearing. There 

is a Gonzalez catheter in the decompressed bladder. Reproductive structures are 

surgically absent. There is a moderate amount of retained stool within the 

colon. There is mild anasarca. 

 

There is diffuse osteopenia. There is 

scattered degenerative and osteoarthritic change. 

 

There is a large bore 

dialysis catheter in the right femoral vein region. There is grade I 

anterolisthesis of L4 on L5. 

 

IMPRESSION: 

1.      Right middle lobe and right lower lobe pneumonia. 

2.      Small bilateral pleural effusions. 

3.      Stable exophytic lesion off the lateral margin of the left kidney. Nonemergent follow-up cynthia
l ultrasound is recommended for additional characterization. 

4.      Moderate amount of retained stool in the rectum and colon. 

5.      Mild anasarca. 

1.      

 

POS: BH

## 2021-02-07 LAB
ANION GAP SERPL CALC-SCNC: 15 MMOL/L (ref 10–20)
BASOPHILS # BLD AUTO: 0 THOU/UL (ref 0–0.2)
BASOPHILS NFR BLD AUTO: 0.1 % (ref 0–1)
BUN SERPL-MCNC: 41 MG/DL (ref 9.8–20.1)
CALCIUM SERPL-MCNC: 7.8 MG/DL (ref 7.8–10.44)
CHLORIDE SERPL-SCNC: 98 MMOL/L (ref 98–107)
CO2 SERPL-SCNC: 24 MMOL/L (ref 23–31)
CREAT CL PREDICTED SERPL C-G-VRATE: 70 ML/MIN (ref 70–130)
DIGOXIN SERPL-MCNC: 1.6 NG/ML (ref 0.8–2)
EOSINOPHIL # BLD AUTO: 0.1 THOU/UL (ref 0–0.7)
EOSINOPHIL NFR BLD AUTO: 0.5 % (ref 0–10)
GLUCOSE SERPL-MCNC: 106 MG/DL (ref 83–110)
HGB BLD-MCNC: 9.9 G/DL (ref 12–16)
LYMPHOCYTES # BLD: 1 THOU/UL (ref 1.2–3.4)
LYMPHOCYTES NFR BLD AUTO: 7.4 % (ref 21–51)
MAGNESIUM SERPL-MCNC: 2.4 MG/DL (ref 1.6–2.6)
MCH RBC QN AUTO: 26.2 PG (ref 27–31)
MCV RBC AUTO: 83 FL (ref 78–98)
MONOCYTES # BLD AUTO: 1 THOU/UL (ref 0.11–0.59)
MONOCYTES NFR BLD AUTO: 6.7 % (ref 0–10)
NEUTROPHILS # BLD AUTO: 12.1 THOU/UL (ref 1.4–6.5)
NEUTROPHILS NFR BLD AUTO: 85.3 % (ref 42–75)
PLATELET # BLD AUTO: 299 THOU/UL (ref 130–400)
POTASSIUM SERPL-SCNC: 4.3 MMOL/L (ref 3.5–5.1)
RBC # BLD AUTO: 3.79 MILL/UL (ref 4.2–5.4)
SODIUM SERPL-SCNC: 133 MMOL/L (ref 136–145)
TROPONIN I SERPL DL<=0.01 NG/ML-MCNC: 0.69 NG/ML (ref ?–0.03)
WBC # BLD AUTO: 14.1 THOU/UL (ref 4.8–10.8)

## 2021-02-07 RX ADMIN — Medication SCH MLS: at 12:03

## 2021-02-07 RX ADMIN — FAMOTIDINE SCH MG: 10 INJECTION, SOLUTION INTRAVENOUS at 09:32

## 2021-02-07 RX ADMIN — Medication SCH ML: at 10:23

## 2021-02-07 RX ADMIN — FAMOTIDINE SCH MG: 10 INJECTION, SOLUTION INTRAVENOUS at 21:33

## 2021-02-07 RX ADMIN — Medication SCH ML: at 21:33

## 2021-02-07 NOTE — PDOC.HOSPP
- Subjective


Encounter Date: 02/07/21


Encounter Time: 12:30


Subjective: 


is on vent, minimal sedation, tries to awaken, was moving all extremities per 

staff


 at bedside





- Objective


Vital Signs & Weight: 


                             Vital Signs (12 hours)











  Temp Pulse Resp BP


 


 02/07/21 16:00    13 


 


 02/07/21 15:46   86   123/63


 


 02/07/21 14:00    13 


 


 02/07/21 12:00  98.8 F   9 L 


 


 02/07/21 11:14   80   111/48 L


 


 02/07/21 10:00    16 


 


 02/07/21 08:00    16 


 


 02/07/21 07:41   84   118/70


 


 02/07/21 07:00  98.4 F   


 


 02/07/21 06:00  98.7 F   16 








                                     Weight











Weight                         162 lb 0.636 oz











                            Most Recent Monitor Data











Heart Rate from ECG            92


 


NIBP                           128/61


 


NIBP BP-Mean                   83


 


Respiration from ECG           14


 


SpO2                           100














I&O: 


                                        











 02/06/21 02/07/21 02/08/21





 06:59 06:59 06:59


 


Intake Total  451 


 


Output Total  1095 745


 


Balance  -644 -745











Result Diagrams: 


                                 02/07/21 03:38





                                 02/07/21 03:38


Additional Labs: 


                                   Accuchecks











  02/06/21





  16:49


 


POC Glucose  127 H














Hospitalist ROS





- Medication


Medications: 


Active Medications











Generic Name Dose Route Start Last Admin





  Trade Name Freq  PRN Reason Stop Dose Admin


 


Famotidine  20 mg  02/07/21 09:00  02/07/21 09:32





  Famotidine/Pf 20 Mg/2ml Vial  SLOW IVP   20 mg





  Q12HR SILVIANO   Administration


 


Furosemide  40 mg  02/07/21 14:00  02/07/21 14:06





  Furosemide 40 Mg/4 Ml Vial  SLOW IVP   40 mg





  0600,1400 SILVIANO   Administration


 


Cefepime HCl 2 gm/ Sodium  100 mls @ 200 mls/hr  02/06/21 21:00  02/07/21 09:31





  Chloride  IVPB   100 mls





  Q12HR SILVIANO   Administration


 


Levothyroxine Sodium  125 mcg  02/07/21 06:00  02/07/21 06:36





  Levothyroxine Sodium 125 Mcg Tab  PO   125 mcg





  0600 SILVIANO   Administration


 


Pantoprazole Sodium  40 mg  02/06/21 21:00  02/07/21 09:31





  Pantoprazole 40 Mg Vial  IVP   40 mg





  BID SILVIANO   Administration


 


Sodium Chloride  10 ml  02/07/21 09:00  02/07/21 10:23





  Flush - Normal Saline 10 Ml Syringe  IVF   10 ml





  Q12HR SILVIANO   Administration














Hospitalist Exam


Vitals: 


                             Vital Signs (12 hours)











  Temp Pulse Resp BP


 


 02/07/21 16:00    13 


 


 02/07/21 15:46   86   123/63


 


 02/07/21 14:00    13 


 


 02/07/21 12:00  98.8 F   9 L 


 


 02/07/21 11:14   80   111/48 L


 


 02/07/21 10:00    16 


 


 02/07/21 08:00    16 


 


 02/07/21 07:41   84   118/70


 


 02/07/21 07:00  98.4 F   


 


 02/07/21 06:00  98.7 F   16 








                                     Weight











Weight                         162 lb 0.636 oz











                            Most Recent Monitor Data











Heart Rate from ECG            92


 


NIBP                           128/61


 


NIBP BP-Mean                   83


 


Respiration from ECG           14


 


SpO2                           100














General Appearance: ill appearing


Eye: PERRL, anicteric sclera


ENT: no oropharyngeal lesions, moist mucosa


Neck: supple, no JVD


Heart: RRR, no murmur


Respiratory: no wheezes, no rales


Gastrointestinal: soft, non-tender, non-distended, normal bowel sounds


Extremities: no cyanosis, 1+ LE edema


Neurological: cranial nerve grossly intact, no focal deficits





Hosp A/P


(1) Acute respiratory failure with hypoxia


Code(s): J96.01 - ACUTE RESPIRATORY FAILURE WITH HYPOXIA   Status: Acute   





(2) ARJUN (acute kidney injury)


Code(s): N17.9 - ACUTE KIDNEY FAILURE, UNSPECIFIED   Status: Resolved   





(3) Hyperkalemia


Code(s): E87.5 - HYPERKALEMIA   Status: Resolved   





(4) CAD (coronary artery disease)


Code(s): I25.10 - ATHSCL HEART DISEASE OF NATIVE CORONARY ARTERY W/O ANG PCTRS  

Status: Chronic   


Qualifiers: 


   Coronary Disease-Associated Artery/Lesion type: bypass graft   Native vs. 

transplanted heart: native heart   Associated angina: without angina   Qualified

Code(s): I25.810 - Atherosclerosis of coronary artery bypass graft(s) without 

angina pectoris   





(5) DM type 2 (diabetes mellitus, type 2)


Status: Chronic   


Qualifiers: 


   Diabetes mellitus long term insulin use: with long term use 





(6) HTN (hypertension)


Code(s): I10 - ESSENTIAL (PRIMARY) HYPERTENSION   Status: Chronic   


Qualifiers: 


   Hypertension type: essential hypertension   Qualified Code(s): I10 - [facility]

l (primary) hypertension   





(7) Dyslipidemia


Code(s): E78.5 - HYPERLIPIDEMIA, UNSPECIFIED   Status: Chronic   





(8) h/o covid 19 virus infection


Status: Chronic   





(9) A-fib


Code(s): I48.91 - UNSPECIFIED ATRIAL FIBRILLATION   Status: Chronic   


Qualifiers: 


   Atrial fibrillation type: paroxysmal   Qualified Code(s): I48.0 - Paroxysmal 

atrial fibrillation   





(10) Gastric ulcer


Code(s): K25.9 - GASTRIC ULCER, UNSP AS ACUTE OR CHRONIC, W/O HEMOR OR PERF   

Status: Acute   


Qualifiers: 


   Gastric ulcer chronicity: acute 





- Plan





had brief cardiac arrest with cpr done in ER


had one session of emergent HD done for hyperkalemia and arjun on admission, renal

function is at baseline this am with normal potassium.


weaning per pulm advice


she has had multiple hospitalizations/rehab in [facility] in the last month and half, has not ambulated yet in those many days per 

.


severe deconditioning


continue cefepime, protonix bid, synthroid, taper and dc levophed


echo in jan 2021 showed ef of 55% with rvsp of 65, she had covid pna and was alena

gnosed in dec 2020,  had it too and has recovered.


hemostable

## 2021-02-07 NOTE — CON
DATE OF CONSULTATION:  



CONSULTING PHYSICIAN:  Sarah Bennett MD.



REQUESTING PHYSICIAN:  ER physician.



REASON FOR CONSULTATION:  Severe symptomatic hyperkalemia.



IMPRESSION:  Severe symptomatic hyperkalemia, partly iatrogenic in the context of

potassium supplementation, compounded by reduction in the GFR. 



PLAN:  Emergent hemodialysis to address the severe symptomatic hyperkalemia as the

patient is already coded with significant cardiac arrhythmia. 



HISTORY OF PRESENT ILLNESS:  History is that of 75-year-old female patient, who was

brought into the ER with nausea and weakness, noted with elevated creatinine and

severely elevated potassium, while in the ER with significant cardiac arrhythmia

related to hyperkalemia.  The patient coded the possible need for renal consultation

and the need for the emergency hemodialysis. 



PAST MEDICAL HISTORY:  Significant for CABG and coronary artery disease.



FAMILY HISTORY:  Nonsignificantly related to present illness according to record.



SOCIAL HISTORY:  The patient is a former smoker.



REVIEW OF SYSTEMS:  As documented in the body of the history.  All the other systems

were reviewed and found not to be significantly related to present illness. 



PHYSICAL EXAMINATION:

GENERAL:  The patient is found to be intubated with the following vital signs;

afebrile, temperature 97.3, pulse 71, respiratory rate of 22, O2 saturation 100%. 

HEENT:  Remarkable for endotracheal tube in place. 

CARDIOVASCULAR SYSTEM:  First and second heart sounds were heard. 

RESPIRATORY SYSTEM:  Revealed breath sounds. 

DIGESTIVE SYSTEM:  Benign abdomen. 

EXTREMITIES:  No peripheral edema. 

SKIN:  No new gross rash. 

LYMPHATICS:  No peripheral lymphadenopathy.



SUMMARY:  A 75-year-old female patient who came in and coded in the context of

severe hyperkalemia. 



Thank you for this consultation.  We will follow with you.







Job ID:  [id number]

## 2021-02-07 NOTE — PRG
DATE OF SERVICE:  02/07/2021



SUBJECTIVE:  The patient seen and examined, seems to be doing okay.  Noted with the

following vital signs. 



OBJECTIVE:  VITAL SIGNS:  Blood pressure 123/63, pulse 86, respiratory rate of

__________. 

HEENT:  Unremarkable. 

CARDIOVASCULAR SYSTEM:  First and second heart sounds were heard. 

RESPIRATORY SYSTEM:  Clear to auscultation. 

DIGESTIVE SYSTEM:  Revealed a benign abdomen. 

EXTREMITIES:  Showed some peripheral edema. 

SKIN:  No new gross rash.



LABORATORY INVESTIGATION:  Showed a potassium that has gone down to 4.3, creatinine

0.82. 



IMPRESSION:  

1. Severe hyperkalemia, resolved, status post one session of hemodialysis.

2. Acute on chronic kidney disease, much improved.



PLAN:  

1. There is no further indication for hemodialysis.  We __________ recommend for

dialysis catheter to be taken out.  However, if this is the only access for this

patient at this point, may decide to remove out of this dialysis catheter. 

2. Diuretics as tolerated.

3. Further management to be dependent on the clinical course.







Job ID:  [id number]

## 2021-02-07 NOTE — CON
DATE OF CONSULTATION:  02/07/2021



HISTORY OF PRESENT ILLNESS:  Ms. Garza is a 75-year-old female, who has been 
over

to [facility] after being in the hospital for GI bleed. 



She has a history of a cardiomyopathy, but her ejection fraction is improved to

normal with the exception of diastolic dysfunction and LVH.  She also had

mild-to-moderate mitral regurg. 



She presented asking the nurses at [facility] to send her to the hospital because
she

felt weak.  In the emergency room, she arrested.  Creatinine was only mildly

elevated.  Her potassium was 6.7.  She was resuscitated and transferred to the 
ICU.

I was consulted. 



She was dialyzed.



PAST MEDICAL HISTORY:  Remarkable for;

1. Atrial fibrillation.

2. History of coronary artery bypass grafting with mitral ring repair.

3. Diabetes.

4. Hypertension.

5. History of a COVID infection in December when her cardiomyopathy was 
diagnosed.

6. Reported history of dementia.

7. History of stent in the past.

8. History of a watchman device.

9. History of hysterectomy.

10. Cholecystectomy.

11. History of wrist surgery.



MEDICATIONS:  She is on 19 medicines prior to admission, two of which were 
diuretics

and potassium. 



ALLERGIES:  SHE REPORTS ALLERGIES TO ATORVASTATIN, CODEINE, ESOMEPRAZOLE, AND

SIMVASTATIN. 



SOCIAL HISTORY:  She is a former smoker.  Does not drink.



FAMILY HISTORY:  Negative for lung disease in early age.



REVIEW OF SYSTEMS:  Otherwise not obtainable.



PHYSICAL EXAMINATION:

VITAL SIGNS:  Blood pressure is 111/48, heart rate is 80, respiratory rates in 
the

20s.  When she goes sleep, her respiratory rate slows down. 

HEENT:  Pupils react.  Sclerae are anicteric.  She wakes up and tries to talk.  
She

moves all extremities. 

HEAD AND NECK:  Otherwise unremarkable. 

LUNGS:  Clear. 

HEART:  Regular rhythm. 

ABDOMEN:  Soft. 

EXTREMITIES:  No clubbing, cyanosis, or edema.



LABORATORY DATA:  White count 14.1, hemoglobin 9.9, platelets 299.  Blood gas

yesterday 7.1, CO2 of 36, PO2 of 83 __________ blood gas that was arterial; 
sodium

133, potassium 4.3, chloride 98, bicarb 24, BUN 41, creatinine 0.8. 



IMPRESSION:  Respiratory failure after cardiac arrest, presumably secondary to 
an

elevated potassium.  I suspect she was just on the dry side and then taking

potassium supplements which led to her arrest.  I met with her  and 
answered

all of his questions.  We will try spontaneous breathing trial.  She does well 
and

is awake and alert and we can consider extubation.  If not, we can re-evaluate 
her

in the morning. 



Critical care time 30 min.



Job ID:  [id number]



MTDD

## 2021-02-07 NOTE — CON
DATE OF CONSULTATION:  



HISTORY OF PRESENT ILLNESS:  Carrie Garza is a 75-year-old white female, who has

been seen by Dr. Cross recently, but her primary cardiologist is Dr. Donnelly.
 In

December 2020, she was hospitalized with COVID-19 infection.  She appeared to

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  She also

developed atrial fibrillation with rapid ventricular response and was placed on

diltiazem, Multaq, and digoxin.  Her ejection fraction improved to 50% on 
December 8th, and then another echocardiogram on January 30th revealed ejection fraction 
of

55% to 60% with grade 2/3 diastolic dysfunction, mild left ventricular 
hypertrophy,

moderate left atrial enlargement, mild right atrial enlargement, mitral annular

calcification, mild-to-moderate mitral regurgitation, aortic valvular sclerosis,

mild tricuspid regurgitation, elevated right ventricular systolic pressure at 
65,

and mild-to-moderate pulmonic regurgitation. 



She was again hospitalized in late January 2021 for hemoglobin of 6.2 with

confusion.  She was ultimately found to have a 6 mm gastric ulcer.  She was

transfused. 



She then presented yesterday afternoon with weakness and nausea.  While she was

being evaluated in the emergency room, she became unresponsive.  CPR was 
started.

She was intubated, found to have a potassium of 6.7.  She was given several amps
of

calcium gluconate, bicarbonate, insulin with D50 and had return of spontaneous

circulation very quickly.  She also was bradycardic and was transcutaneously 
paced.

She ultimately underwent dialysis with return of normal sinus rhythm.  Also, she
had

a left bundle-branch block on her EKG, which also has resolved.  At the present

time, she remains intubated in the ICU and is unable to give any history. 



PAST MEDICAL HISTORY:  Paroxysmal atrial fibrillation, coronary artery disease,

status post CABG and mitral valve repair, diabetes, hypertension, COVID 
infection in

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
55%

to 60%, and dementia. 



PAST SURGICAL HISTORY:  CABG with mitral valve repair, history of stent 
placement,

Watchman device placement, hysterectomy, cholecystectomy, and right wrist 
surgery. 



MEDICATIONS:  

1. Aspirin 81 daily.

2. Pulmicort 1 puff q.12 hours p.r.n.

3. Digoxin 0.125 daily.

4. Diltiazem 120 t.i.d.

5. Multaq 400 mg b.i.d.

6. Furosemide 40 daily.

7. Gabapentin 200 t.i.d.

8. Humalog, insulin.

9. __________.

10. Levothyroxine 125 mcg daily.

11. Losartan 12.5 daily.

12. Magnesium oxide 400 daily.

13. Metformin 500 b.i.d.

14. Zaroxolyn 5 mg daily.

15. Protonix 40 b.i.d.

16. KCl 20 mEq daily.

17. MiraLAX.

18. Senokot.

19. Spironolactone 12.5 daily.



ALLERGIES:  

1. ANESTHETICS - AMIDE AND CAROL TYPES.

2. ATORVASTATIN.

3. CODEINE.

4. ESOMEPRAZOLE.

5. __________.

6. SIMVASTATIN.



SOCIAL HISTORY:  She stopped smoking 10 years ago.  She does not drink.



FAMILY HISTORY:  Unobtainable.



REVIEW OF SYSTEMS:  Unobtainable.



PHYSICAL EXAMINATION:

VITAL SIGNS:  Blood pressure 118/70, pulse of 84. 

HEENT: PERRL. 

CHEST:  Clear. 

CARDIAC:  S1 and S2 are normal without any S3, S4, or murmurs. 

ABDOMEN:  Obese. Normal bowel sounds.  No tenderness. 

EXTREMITIES: Reveal 1+ pretibial edema. 

NEUROLOGICAL: The patient is not on sedation at the present time and only 
grimaces

to stimuli. 



LABORATORY DATA:  The only EKG on the chart shows left bundle-branch block.  
This

certainly may be a ventricular escape rhythm and that is very hard to delineate

these definite P waves except possibly in V6.  On the monitor at the present 
time,

she is in sinus rhythm with a narrow QRS complex.  No EKG has been repeated.

Abdominal and pelvis CT revealed right middle lobe and right lower lobe 
pneumonia,

small bilateral pleural effusions, mild anasarca.  Chest x-ray revealed right 
apical

opacification. White count 14,100, hemoglobin 9.9, hematocrit 31.4, and 
platelets

299,000. INR 1.0.  PH 7.41, pCO2 of 36.0, pO2 of 83.9.  Sodium 133, potassium 
4.3,

chloride 98, carbon dioxide 24, BUN 41, creatinine 0.8.  Lactic acid initially 
was

4.5, but is now down to 2.7. Troponin I 0.011.  Initial potassium was 6.7.  
Digoxin

level 2.76.  COVID negative. 



IMPRESSION:  

1. Apparent cardiac arrest, requiring transcutaneous pacing for a period of time
in

the emergency room until she was dialyzed.  Her potassium was 6.7 and digoxin 
level

2.76. 

2. Sepsis and probable pneumonia, started on broad-spectrum antibiotics.

3. History of paroxysmal atrial fibrillation.

4. History of viral cardiomyopathy with improvement from 20% to 55% to 60% on

January 30th. 

5. Status post coronary artery bypass grafting and mitral valve repair.

6. History of Watchman placement.

7. COVID-19 infection in December 2020.

8. Hyperkalemia with potassium of 6.7, status post dialysis.  She was on ARB,

spironolactone, and potassium replacement.  However, potassium had been fairly

normal prior to discharge and on lab work four days prior to this admission. 

10. Gastric ulcer with gastrointestinal bleeding, down to hemoglobin of 6.2 one 
to

two weeks ago. 



RECOMMENDATIONS:  The patient has had return of normal sinus rhythm and a narrow
QRS

complex with improvement in her hyperkalemia.  EKG will be obtained.  Another

troponin I will be obtained from this morning's blood work.  I would continue to

hold the Multaq, Cardizem, and digoxin at this time. 







Job ID:  [id number]



[facility]

## 2021-02-08 LAB
ANION GAP SERPL CALC-SCNC: 17 MMOL/L (ref 10–20)
BUN SERPL-MCNC: 25 MG/DL (ref 9.8–20.1)
CALCIUM SERPL-MCNC: 8 MG/DL (ref 7.8–10.44)
CHLORIDE SERPL-SCNC: 92 MMOL/L (ref 98–107)
CO2 SERPL-SCNC: 27 MMOL/L (ref 23–31)
CREAT CL PREDICTED SERPL C-G-VRATE: 76 ML/MIN (ref 70–130)
GLUCOSE SERPL-MCNC: 168 MG/DL (ref 83–110)
HGB BLD-MCNC: 10.1 G/DL (ref 12–16)
MCH RBC QN AUTO: 26.7 PG (ref 27–31)
MCV RBC AUTO: 82.5 FL (ref 78–98)
MDIFF COMPLETE?: YES
OVALOCYTES BLD QL SMEAR: (no result) (100X)
PLATELET # BLD AUTO: 314 THOU/UL (ref 130–400)
POLYCHROMASIA BLD QL SMEAR: (no result) (100X)
POTASSIUM SERPL-SCNC: 2.6 MMOL/L (ref 3.5–5.1)
POTASSIUM SERPL-SCNC: 3.1 MMOL/L (ref 3.5–5.1)
RBC # BLD AUTO: 3.77 MILL/UL (ref 4.2–5.4)
SODIUM SERPL-SCNC: 133 MMOL/L (ref 136–145)
WBC # BLD AUTO: 11.5 THOU/UL (ref 4.8–10.8)

## 2021-02-08 RX ADMIN — Medication SCH MLS: at 01:06

## 2021-02-08 RX ADMIN — POTASSIUM CHLORIDE SCH MLS: 14.9 INJECTION, SOLUTION INTRAVENOUS at 10:55

## 2021-02-08 RX ADMIN — Medication SCH ML: at 08:32

## 2021-02-08 RX ADMIN — Medication SCH ML: at 20:35

## 2021-02-08 RX ADMIN — FAMOTIDINE SCH MG: 10 INJECTION, SOLUTION INTRAVENOUS at 08:28

## 2021-02-08 RX ADMIN — POTASSIUM CHLORIDE SCH MLS: 14.9 INJECTION, SOLUTION INTRAVENOUS at 09:08

## 2021-02-08 RX ADMIN — POTASSIUM CHLORIDE SCH MLS: 14.9 INJECTION, SOLUTION INTRAVENOUS at 14:12

## 2021-02-08 RX ADMIN — POTASSIUM CHLORIDE SCH MLS: 14.9 INJECTION, SOLUTION INTRAVENOUS at 16:41

## 2021-02-08 NOTE — PRG
DATE OF SERVICE:  02/08/2021



SUBJECTIVE:  The patient is still on life support.



OBJECTIVE:  VITAL SIGNS:  Noted with the following vital signs; afebrile,

temperature 98.8, O2 saturation 100%, blood pressure 97/66. 

HEENT:  Remarkable for endotracheal tube in place. 

CARDIOVASCULAR SYSTEM:  Revealed first and second sounds were heard. 

RESPIRATORY SYSTEM:  Revealed vented sounds. 

DIGESTIVE SYSTEM:  Revealed a benign abdomen. 

EXTREMITIES:  Showed some peripheral edema.



LABORATORY INVESTIGATION:  Showed a potassium of 2.6 and creatinine 0.73.



IMPRESSION:  

1. Severe hyperkalemia requiring dialysis, but now the patient is hypokalemic.

2. Acute kidney injury, seems to have resolved.



PLAN:  

1. Monitor the electrolytes and replete accordingly.  Hypokalemia likely related to

diuretics.  The patient seems to be responding well to diuresis. 

2. No indication for further renal replacement therapy (hemodialysis).  If the

access line on dialysis no longer needed, we will recommend discontinuation of the

dialysis catheter. 







Job ID:  [id number]

## 2021-02-08 NOTE — PRG
DATE OF SERVICE:  02/08/2021



SUBJECTIVE:  Ms. Garza will awaken.  Her pressure support, tidal volumes 
yesterday

were around 100 mL.  They are about 250 mL today with 5 of pressure support, 5 
of

PEEP.  We will stop all sedation.  Hopefully, we will see an improvement in her

strength parameters and we can consider extubation later today. 



OBJECTIVE:  VITAL SIGNS:  Heart rate is 98, blood pressure is 112/71, 
respiratory

rates in the teens. 

LUNGS:  Clear. 

HEART:  Regular rhythm. 

ABDOMEN:  Soft. 



There is no repeat lab today for some reason.  We will order this.  We will 
continue

with supportive care.  Chest x-ray was reviewed, shows mild increased 
interstitial

markings.  We will continue to watch her closely. 



Critical care time 30 min.



Job ID:  [id number]



MTDD

## 2021-02-08 NOTE — PDOC.HOSPP
- Subjective


Encounter Date: 02/08/21


Encounter Time: 12:15


Subjective: 


off sedation awakens and moves all extremities


is on vent





- Objective


Vital Signs & Weight: 


                             Vital Signs (12 hours)











  Temp Pulse Resp BP Pulse Ox


 


 02/08/21 16:00  98.0 F   28 H   100


 


 02/08/21 14:50   107 H   104/63 


 


 02/08/21 14:00    26 H  


 


 02/08/21 12:00  99.1 F   14  


 


 02/08/21 11:13   106 H   90/50 L 


 


 02/08/21 10:00    20  


 


 02/08/21 08:00    15   100


 


 02/08/21 07:59   110 H   112/67 


 


 02/08/21 07:00  98.2 F    


 


 02/08/21 06:00    16  








                                     Weight











Admit Weight                   162 lb 0.636 oz


 


Weight                         156 lb 15.506 oz











                            Most Recent Monitor Data











Heart Rate from ECG            102


 


NIBP                           97/66


 


NIBP BP-Mean                   76


 


Respiration from ECG           24


 


SpO2                           99














I&O: 


                                        











 02/07/21 02/08/21 02/09/21





 06:59 06:59 06:59


 


Intake Total 451 166 50


 


Output Total 2445 3170 2255


 


Balance -644 -3004 -2205











Result Diagrams: 


                                 02/08/21 07:48





                                 02/08/21 07:48





Hospitalist ROS





- Medication


Medications: 


Active Medications











Generic Name Dose Route Start Last Admin





  Trade Name Freq  PRN Reason Stop Dose Admin


 


Furosemide  40 mg  02/07/21 14:00  02/08/21 14:21





  Furosemide 40 Mg/4 Ml Vial  SLOW IVP   40 mg





  0600,1400 SILVIANO   Administration


 


Cefepime HCl 2 gm/ Sodium  100 mls @ 200 mls/hr  02/06/21 21:00  02/08/21 08:28





  Chloride  IVPB   100 mls





  Q12HR SILVIANO   Administration


 


Norepinephrine Bitartrate  250 mls @ 0 mls/hr  02/07/21 12:15  02/08/21 01:06





  Levophed  IVPB   250 mls





  INF SILVIANO   Administration





  Protocol  





  Titrate  


 


Potassium Chloride 20 meq/  100 mls @ 50 mls/hr  02/08/21 09:00  02/08/21 16:41





  Device  IVPB  02/08/21 16:59  100 mls





  Q2H SILVIANO   Administration


 


Levothyroxine Sodium  125 mcg  02/07/21 06:00  02/08/21 06:35





  Levothyroxine Sodium 125 Mcg Tab  PO   125 mcg





  0600 SILVIANO   Administration


 


Lorazepam  2 mg  02/06/21 17:00  02/08/21 10:09





  Lorazepam 2 Mg/Ml Vial  SLOW IVP  03/08/21 17:00  2 mg





  Q1H PRN   Administration





  Breakthrough agitation  


 


Pantoprazole Sodium  40 mg  02/06/21 21:00  02/08/21 08:32





  Pantoprazole 40 Mg Vial  IVP   40 mg





  BID SILVIANO   Administration


 


Sodium Chloride  10 ml  02/07/21 09:00  02/08/21 08:32





  Flush - Normal Saline 10 Ml Syringe  IVF   10 ml





  Q12HR SILVIANO   Administration














Hospitalist Exam


Vitals: 


                             Vital Signs (12 hours)











  Temp Pulse Resp BP Pulse Ox


 


 02/08/21 16:00  98.0 F   28 H   100


 


 02/08/21 14:50   107 H   104/63 


 


 02/08/21 14:00    26 H  


 


 02/08/21 12:00  99.1 F   14  


 


 02/08/21 11:13   106 H   90/50 L 


 


 02/08/21 10:00    20  


 


 02/08/21 08:00    15   100


 


 02/08/21 07:59   110 H   112/67 


 


 02/08/21 07:00  98.2 F    


 


 02/08/21 06:00    16  








                                     Weight











Admit Weight                   162 lb 0.636 oz


 


Weight                         156 lb 15.506 oz











                            Most Recent Monitor Data











Heart Rate from ECG            102


 


NIBP                           97/66


 


NIBP BP-Mean                   76


 


Respiration from ECG           24


 


SpO2                           99














Eye: PERRL, anicteric sclera


ENT: no oropharyngeal lesions, moist mucosa


Neck: supple, no JVD


Heart: RRR, no murmur


Respiratory: no wheezes, no rales


Gastrointestinal: soft, non-tender, non-distended, normal bowel sounds


Extremities: no cyanosis, no edema


Neurological: cranial nerve grossly intact, no focal deficits





Hosp A/P


(1) Acute respiratory failure with hypoxia


Code(s): J96.01 - ACUTE RESPIRATORY FAILURE WITH HYPOXIA   Status: Acute   





(2) ARJUN (acute kidney injury)


Code(s): N17.9 - ACUTE KIDNEY FAILURE, UNSPECIFIED   Status: Resolved   





(3) Hyperkalemia


Code(s): E87.5 - HYPERKALEMIA   Status: Resolved   





(4) CAD (coronary artery disease)


Code(s): I25.10 - ATHSCL HEART DISEASE OF NATIVE CORONARY ARTERY W/O ANG PCTRS  

Status: Chronic   


Qualifiers: 


   Coronary Disease-Associated Artery/Lesion type: bypass graft   Native vs. 

transplanted heart: native heart   Associated angina: without angina   Qualified

Code(s): I25.810 - Atherosclerosis of coronary artery bypass graft(s) without 

angina pectoris   





(5) DM type 2 (diabetes mellitus, type 2)


Status: Chronic   


Qualifiers: 


   Diabetes mellitus long term insulin use: with long term use 





(6) HTN (hypertension)


Code(s): I10 - ESSENTIAL (PRIMARY) HYPERTENSION   Status: Chronic   


Qualifiers: 


   Hypertension type: essential hypertension   Qualified Code(s): I10 - 

Essential (primary) hypertension   





(7) Dyslipidemia


Code(s): E78.5 - HYPERLIPIDEMIA, UNSPECIFIED   Status: Chronic   





(8) h/o covid 19 virus infection


Status: Chronic   





(9) A-fib


Code(s): I48.91 - UNSPECIFIED ATRIAL FIBRILLATION   Status: Chronic   


Qualifiers: 


   Atrial fibrillation type: paroxysmal   Qualified Code(s): I48.0 - Paroxysmal 

atrial fibrillation   





(10) Gastric ulcer


Code(s): K25.9 - GASTRIC ULCER, UNSP AS ACUTE OR CHRONIC, W/O HEMOR OR PERF   

Status: Acute   


Qualifiers: 


   Gastric ulcer chronicity: acute 





- Plan





had brief cardiac arrest with cpr done in ER


had one session of emergent HD done for hyperkalemia and arjun on admission, renal

function is at baseline this am with normal potassium.


weaning per pulm advice


she has had multiple hospitalizations/rehab in [facility] in the last month and half, has not ambulated yet in those many days per 

.


severe deconditioning


continue cefepime, protonix bid, synthroid, taper and dc levophed


echo in jan 2021 showed ef of 55% with rvsp of 65, she had covid pna and was 

diagnosed in dec 2020,  had it too and has recovered.


hemostable


d/w  daily at bedside

## 2021-02-08 NOTE — RAD
EXAM: Single view of the chest



HISTORY:   Ventilated patient with respiratory failure



COMPARISON: 2/6/2021



FINDINGS: Single view of the chest shows an enlarged but stable cardiomediastinal silhouette.  The pa
tient is status post sternotomy.  The lines and tubes are unchanged in position. Increased

interstitial markings are stable. There appears to be an infiltrate in the left lung base.. No acute 
osseous abnormality.



IMPRESSION: Stable exam



Reported By: David Doyle 

Electronically Signed:  2/8/2021 8:10 AM

## 2021-02-09 LAB
ANION GAP SERPL CALC-SCNC: 17 MMOL/L (ref 10–20)
ANION GAP SERPL CALC-SCNC: 19 MMOL/L (ref 10–20)
BASOPHILS # BLD AUTO: 0 THOU/UL (ref 0–0.2)
BASOPHILS NFR BLD AUTO: 0.2 % (ref 0–1)
BUN SERPL-MCNC: 25 MG/DL (ref 9.8–20.1)
BUN SERPL-MCNC: 26 MG/DL (ref 9.8–20.1)
CALCIUM SERPL-MCNC: 7.8 MG/DL (ref 7.8–10.44)
CALCIUM SERPL-MCNC: 8 MG/DL (ref 7.8–10.44)
CHLORIDE SERPL-SCNC: 94 MMOL/L (ref 98–107)
CHLORIDE SERPL-SCNC: 95 MMOL/L (ref 98–107)
CO2 SERPL-SCNC: 28 MMOL/L (ref 23–31)
CO2 SERPL-SCNC: 28 MMOL/L (ref 23–31)
CREAT CL PREDICTED SERPL C-G-VRATE: 0 ML/MIN (ref 70–130)
CREAT CL PREDICTED SERPL C-G-VRATE: 0 ML/MIN (ref 70–130)
EOSINOPHIL # BLD AUTO: 0.1 THOU/UL (ref 0–0.7)
EOSINOPHIL NFR BLD AUTO: 0.5 % (ref 0–10)
GLUCOSE SERPL-MCNC: 160 MG/DL (ref 83–110)
GLUCOSE SERPL-MCNC: 199 MG/DL (ref 83–110)
HGB BLD-MCNC: 10.1 G/DL (ref 12–16)
LYMPHOCYTES # BLD: 1.7 THOU/UL (ref 1.2–3.4)
LYMPHOCYTES NFR BLD AUTO: 13.1 % (ref 21–51)
MCH RBC QN AUTO: 26.5 PG (ref 27–31)
MCV RBC AUTO: 82.3 FL (ref 78–98)
MONOCYTES # BLD AUTO: 0.9 THOU/UL (ref 0.11–0.59)
MONOCYTES NFR BLD AUTO: 7.2 % (ref 0–10)
NEUTROPHILS # BLD AUTO: 10.2 THOU/UL (ref 1.4–6.5)
NEUTROPHILS NFR BLD AUTO: 79 % (ref 42–75)
PLATELET # BLD AUTO: 338 THOU/UL (ref 130–400)
POTASSIUM SERPL-SCNC: 2.9 MMOL/L (ref 3.5–5.1)
POTASSIUM SERPL-SCNC: 3.3 MMOL/L (ref 3.5–5.1)
RBC # BLD AUTO: 3.81 MILL/UL (ref 4.2–5.4)
SODIUM SERPL-SCNC: 136 MMOL/L (ref 136–145)
SODIUM SERPL-SCNC: 139 MMOL/L (ref 136–145)
WBC # BLD AUTO: 12.9 THOU/UL (ref 4.8–10.8)

## 2021-02-09 RX ADMIN — Medication SCH ML: at 08:21

## 2021-02-09 RX ADMIN — Medication SCH MLS: at 02:28

## 2021-02-09 RX ADMIN — Medication SCH ML: at 21:15

## 2021-02-09 RX ADMIN — Medication SCH MLS: at 15:23

## 2021-02-09 NOTE — PRG
DATE OF SERVICE:  02/09/2021



OBJECTIVE:  VITAL SIGNS:  The patient is seen, noted with the following vital signs.

 Blood pressure 99/60, pulse of 107, respiratory rate of 18, and O2 saturation of

100%. 

HEENT:  Unremarkable. 

CARDIOVASCULAR SYSTEM:  First and second heart sounds were heard. 

RESPIRATORY SYSTEM:  Clear to auscultation. 

DIGESTIVE SYSTEM:  Revealed a benign abdomen. 

EXTREMITIES:  Showed some peripheral edema.



LABORATORY INVESTIGATION:  Showed a hemoglobin of 10.1.  Chemistry showed a

potassium of 3.3, creatinine 0.8. 



IMPRESSION:  

1. Acute on chronic kidney disease, improved.

2. Hyperkalemia, now dealing with hypokalemia.

3. Cardiopulmonary failure.



PLAN:  

1. We will continue to replete the potassium.

2. We will begin to deescalate diuresis within the next 24 hours.

3. Further management to be dependent on the clinical course.







Job ID:  [id number]

## 2021-02-09 NOTE — PDOC.HOSPP
- Subjective


Encounter Date: 02/09/21


Encounter Time: 13:00


Subjective: 


is on vent, awakens easily, squeezes fingers, is moving her legs, keeps her eyes

closed





- Objective


Vital Signs & Weight: 


                             Vital Signs (12 hours)











  Temp Pulse Resp Pulse Ox


 


 02/09/21 12:00  98.8 F   9 L 


 


 02/09/21 10:09   100  


 


 02/09/21 10:00    12 


 


 02/09/21 08:00  99.4 F   16 


 


 02/09/21 06:52     100


 


 02/09/21 06:50   119 H  


 


 02/09/21 06:00    14 


 


 02/09/21 04:00  97.7 F   20 


 


 02/09/21 03:50  97.8 F   








                                     Weight











Admit Weight                   162 lb 0.636 oz


 


Weight                         2.409 oz











                            Most Recent Monitor Data











Heart Rate from ECG            108


 


NIBP                           100/53


 


NIBP BP-Mean                   68


 


Respiration from ECG           13


 


SpO2                           100














I&O: 


                                        











 02/08/21 02/09/21 02/10/21





 06:59 06:59 06:59


 


Intake Total 166 490.5 347.6


 


Output Total 3170 3765 1010


 


Balance -3004 -3274.5 -662.4











Result Diagrams: 


                                 02/09/21 04:00





                                 02/09/21 04:00





Hospitalist ROS





- Medication


Medications: 


Active Medications











Generic Name Dose Route Start Last Admin





  Trade Name Freq  PRN Reason Stop Dose Admin


 


Furosemide  40 mg  02/07/21 14:00  02/09/21 14:04





  Furosemide 40 Mg/4 Ml Vial  SLOW IVP   40 mg





  0600,1400 SILVIANO   Administration


 


Norepinephrine Bitartrate  250 mls @ 0 mls/hr  02/07/21 12:15  02/09/21 02:28





  Levophed  IVPB   250 mls





  INF SILVIANO   Administration





  Protocol  





  Titrate  


 


Cefepime HCl 1 gm/ Sodium  100 mls @ 200 mls/hr  02/08/21 21:00  02/09/21 08:20





  Chloride  IVPB   100 mls





  Q12HR SILVIANO   Administration


 


Levothyroxine Sodium  125 mcg  02/07/21 06:00  02/09/21 05:55





  Levothyroxine Sodium 125 Mcg Tab  PO   125 mcg





  0600 SILVIANO   Administration


 


Lorazepam  2 mg  02/06/21 17:00  02/08/21 19:02





  Lorazepam 2 Mg/Ml Vial  SLOW IVP  03/08/21 17:00  2 mg





  Q1H PRN   Administration





  Breakthrough agitation  


 


Pantoprazole Sodium  40 mg  02/06/21 21:00  02/09/21 08:21





  Pantoprazole 40 Mg Vial  IVP   40 mg





  BID SILVIANO   Administration


 


Potassium Chloride  40 meq  02/09/21 08:00  02/09/21 14:04





  Potassium Chloride 20 Meq Tab  PO  02/10/21 08:01  40 meq





  Q6H SILVIANO   Administration


 


Sodium Chloride  10 ml  02/07/21 09:00  02/09/21 08:21





  Flush - Normal Saline 10 Ml Syringe  IVF   10 ml





  Q12HR SILVIANO   Administration














Hospitalist Exam


Vitals: 


                             Vital Signs (12 hours)











  Temp Pulse Resp Pulse Ox


 


 02/09/21 12:00  98.8 F   9 L 


 


 02/09/21 10:09   100  


 


 02/09/21 10:00    12 


 


 02/09/21 08:00  99.4 F   16 


 


 02/09/21 06:52     100


 


 02/09/21 06:50   119 H  


 


 02/09/21 06:00    14 


 


 02/09/21 04:00  97.7 F   20 


 


 02/09/21 03:50  97.8 F   








                                     Weight











Admit Weight                   162 lb 0.636 oz


 


Weight                         2.409 oz











                            Most Recent Monitor Data











Heart Rate from ECG            108


 


NIBP                           100/53


 


NIBP BP-Mean                   68


 


Respiration from ECG           13


 


SpO2                           100














Eye: PERRL, anicteric sclera


ENT: no oropharyngeal lesions, moist mucosa


Neck: supple, no JVD


Heart: no murmur, irregular


Respiratory: no wheezes, no rales


Gastrointestinal: soft, non-tender, non-distended, normal bowel sounds


Extremities: no cyanosis, no edema


Neurological: cranial nerve grossly intact, no focal deficits





Hosp A/P


(1) Acute respiratory failure with hypoxia


Code(s): J96.01 - ACUTE RESPIRATORY FAILURE WITH HYPOXIA   Status: Acute   





(2) ARJUN (acute kidney injury)


Code(s): N17.9 - ACUTE KIDNEY FAILURE, UNSPECIFIED   Status: Resolved   





(3) Hyperkalemia


Code(s): E87.5 - HYPERKALEMIA   Status: Resolved   





(4) CAD (coronary artery disease)


Code(s): I25.10 - ATHSCL HEART DISEASE OF NATIVE CORONARY ARTERY W/O ANG PCTRS  

Status: Chronic   


Qualifiers: 


   Coronary Disease-Associated Artery/Lesion type: bypass graft   Native vs. 

transplanted heart: native heart   Associated angina: without angina   Qualified

Code(s): I25.810 - Atherosclerosis of coronary artery bypass graft(s) without 

angina pectoris   





(5) DM type 2 (diabetes mellitus, type 2)


Status: Chronic   


Qualifiers: 


   Diabetes mellitus long term insulin use: with long term use 





(6) HTN (hypertension)


Code(s): I10 - ESSENTIAL (PRIMARY) HYPERTENSION   Status: Chronic   


Qualifiers: 


   Hypertension type: essential hypertension   Qualified Code(s): I10 - 

Essential (primary) hypertension   





(7) Dyslipidemia


Code(s): E78.5 - HYPERLIPIDEMIA, UNSPECIFIED   Status: Chronic   





(8) h/o covid 19 virus infection


Status: Chronic   





(9) A-fib


Code(s): I48.91 - UNSPECIFIED ATRIAL FIBRILLATION   Status: Chronic   


Qualifiers: 


   Atrial fibrillation type: paroxysmal   Qualified Code(s): I48.0 - Paroxysmal 

atrial fibrillation   





(10) Gastric ulcer


Code(s): K25.9 - GASTRIC ULCER, UNSP AS ACUTE OR CHRONIC, W/O HEMOR OR PERF   

Status: Acute   


Qualifiers: 


   Gastric ulcer chronicity: acute 





- Plan





had brief cardiac arrest with cpr done in ER for electrolyte issues and volume 

depletion.


had one session of emergent HD done for hyperkalemia and arjun on admission, renal

function is at baseline. Replace potassium with gentle diuresis.


weaning per pulm advice


she has had multiple hospitalizations/rehab in [facility] in the last month and half, has not ambulated yet in those many days per 

.


severe deconditioning


continue protonix bid, synthroid, taper and dc levophed. Change antibiotics to 

doxy and rifampin. Will add vanc if enterococcus turns out resistant in the 

wound cultures.


echo in jan 2021 showed ef of 55% with rvsp of 65, she had covid pna and was 

diagnosed in dec 2020,  had it too and has recovered.


hemostable

## 2021-02-09 NOTE — PDOC.CPN
- Subjective


Date: 02/09/21


Time: 16:48


Interval history: 





Remains sedated and intubated. 





- Review of Systems


ROS unobtainable: due to endotracheal tube





- Objective


Allergies/Adverse Reactions: 


                                    Allergies











Allergy/AdvReac Type Severity Reaction Status Date / Time


 


Anesthetics - Amide Type - Allergy   Verified 12/02/20 18:59





Select A     


 


Anesthetics - Miriam Type- Allergy   Verified 12/02/20 18:59





Parabens     


 


atorvastatin Allergy   Verified 01/29/21 16:17


 


codeine [Codeine] Allergy   Verified 01/29/21 16:16


 


esomeprazole Allergy   Verified 01/29/21 16:17


 


levofloxacin [From Levaquin] Allergy   Verified 01/29/21 16:16


 


simvastatin Allergy   Verified 01/29/21 16:17











Visit Medications: 


                               Current Medications





Acetaminophen (Acetaminophen 325 Mg Tab)  650 mg PO Q8H PRN


   PRN Reason: Headache/Fever/Mild Pain (1-3)


Doxycycline Hyclate (Doxycycline 100 Mg Cap)  100 mg PO BID SILVIANO


Furosemide (Furosemide 40 Mg/4 Ml Vial)  40 mg SLOW IVP 0600,1400 SILVIANO


   Last Admin: 02/09/21 14:04 Dose:  40 mg


   Documented by: 


Fentanyl (Fentanyl Cadd)  100 mls @ 0 mls/hr IV INF SILVIANO; Protocol


   Stop: 03/08/21 17:00


   Last Admin: 02/09/21 15:23 Dose:  100 mls


   Documented by: 


Norepinephrine Bitartrate (Levophed)  250 mls @ 0 mls/hr IVPB INF SILVIANO; Protocol


   Last Admin: 02/09/21 02:28 Dose:  250 mls


   Documented by: 


Levothyroxine Sodium (Levothyroxine Sodium 125 Mcg Tab)  125 mcg PO 0600 SILVIANO


   Last Admin: 02/09/21 05:55 Dose:  125 mcg


   Documented by: 


Lorazepam (Lorazepam 2 Mg/Ml Vial)  2 mg SLOW IVP Q1H PRN


   PRN Reason: Breakthrough agitation


   Stop: 03/08/21 17:00


   Last Admin: 02/08/21 19:02 Dose:  2 mg


   Documented by: 


Miscellaneous Medication (Electrolyte Replacement Protocol)  1 each IVPB ASDIR 

SILVIANO


Morphine Sulfate (Morphine 2 Mg/Ml Vial)  2 mg SLOW IVP Q1H PRN


   PRN Reason: Breakthrough Pain/Agitation


   Stop: 03/08/21 17:00


Discontinue Previous Narcotic Pain Medications And Benzodiazepines  1 each FS 

.ONE SILVIANO


   Stop: 03/08/21 17:00


Ondansetron HCl (Ondansetron Pf 4 Mg/2 Ml Vial)  4 mg IVP Q6H PRN


   PRN Reason: Nausea/Vomiting


Pantoprazole Sodium (Pantoprazole 40 Mg Vial)  40 mg IVP BID [facility]


   Last Admin: 02/09/21 08:21 Dose:  40 mg


   Documented by: 


Potassium Chloride (Potassium Chloride 20 Meq Tab)  40 meq PO Q6H [facility]


   Stop: 02/10/21 08:01


   Last Admin: 02/09/21 14:04 Dose:  40 meq


   Documented by: 


Propofol (Propofol 1,000 Mg/100 Ml Vial)  1,000 mg IV INF PRN; Protocol


   PRN Reason: TO ACHIEVE GOAL RASS


   Stop: 03/08/21 17:00


Propofol (Propofol Bolus 1,000 Mg/100 Ml Vial)  20 mg IV Q5MIN PRN


   PRN Reason: BREAKTHROUGH AGITATION


   Stop: 03/08/21 17:00


Rifampin (Rifampin 300 Mg Cap)  300 mg PO 1000,2200 [facility]


Sodium Chloride (Flush - Normal Saline 10 Ml Syringe)  10 ml IVF Q12HR [facility]


   Last Admin: 02/09/21 08:21 Dose:  10 ml


   Documented by: 


Sodium Chloride (Flush - Normal Saline 10 Ml Syringe)  10 ml IVF PRN PRN


   PRN Reason: Saline Flush








Vital Signs & Weight: 


                                   Vital Signs











  Temp Pulse Resp BP Pulse Ox


 


 02/09/21 15:21   109 H   97/78 


 


 02/09/21 12:00  98.8 F   9 L  


 


 02/09/21 10:09   100   


 


 02/09/21 10:00    12  


 


 02/09/21 08:00  99.4 F   16  


 


 02/09/21 06:52      100


 


 02/09/21 06:50   119 H   


 


 02/09/21 06:00    14  








                                        











Admit Weight                   162 lb 0.636 oz


 


Weight                         2.409 oz

















- Physical Exam


General: other (S/I)


HEENT: mucus membranes moist


Neck: supple neck


Cardiac: irregularly regular


Lungs: clear to auscultation


Neuro: no lateralizing findings


Abdomen: active bowel sounds


Extremities: no edema


Skin: clear


Musculoskeletal: normal range of motion





- Labs


Result Diagrams: 


                                 02/09/21 04:00





                                 02/09/21 04:00


                                  Troponin/CKMB











Troponin I  0.685 ng/mL (< 0.028)  H*  02/07/21  03:38    














- Telemetry


Supraventricular conduction: atrial fibrillation





- Assessment/Plan


Assessment/Plan: 





1. Hyperkalemia, resolved.


2. Cardiac arrest secondary to hyperkalemia


3. Hypokalemia


4. Paroxysmal afib


5. Recent COVID -19 infection


6. 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


7. Anemia, 2a to GI bleed.


8. Presence of a watchman device. 








PLAN:


- Continue supportive care.


- Replace K. 


- Currently lenient rate control


- EP consult to be placed for afib and bradycardia.

## 2021-02-09 NOTE — RAD
PORTABLE CHEST 1 VIEW:

 

DATE: 2/9/2021.

TIME: 8:05 AM.

 

HISTORY: 

Respiratory failure.

 

COMPARISON: 

Previous day.

 

FINDINGS/IMPRESSION: 

Endotracheal and nasogastric tubes remain in place.  The heart size is stable.  The aorta is tortuous
.  No lobar consolidation, pneumothoraces, or large effusions are seen.  There is interval improvemen
t in the left basilar infiltrate.

 

POS: OFF

## 2021-02-09 NOTE — PRG
DATE OF SERVICE:  02/09/2021



SUBJECTIVE:  Carrie Garza was doing well on minimal ventilatory support 
yesterday.

She developed rapid atrial fibrillation and then developed clinical finding

suggestive of pulmonary edema, so she had to go back to being fully ventilated. 



OBJECTIVE:  VITAL SIGNS:  Her atrial fibrillation rate now is in the 90s, blood

pressure is 100/53, respiratory rates in the teens, and oximetry is 100%. 

LUNGS:  Remarkable for equal breath sounds. 

HEART:  Regular rhythm. 

ABDOMEN:  Soft. 

EXTREMITIES:  Without asymmetry.



LABORATORY DATA:  White count 12.9, hemoglobin 10.1, platelets 338.  Sodium 136,

potassium 2.9, chloride 94, bicarb 28, BUN 25, and creatinine 0.73. 



IMPRESSION:  

1. Hypokalemia with intravascular volume depletion on presentation, leading to a

cardiac arrest, now clinically stable. 

2. Atrial fibrillation with pulmonary edema.  We will continue mechanical

ventilation and reassess her in the morning. 



Critical care time 30 min.



Job ID:  [id number]



MTDD

## 2021-02-10 LAB
ANION GAP SERPL CALC-SCNC: 13 MMOL/L (ref 10–20)
BASOPHILS # BLD AUTO: 0 THOU/UL (ref 0–0.2)
BASOPHILS NFR BLD AUTO: 0.4 % (ref 0–1)
BUN SERPL-MCNC: 33 MG/DL (ref 9.8–20.1)
CALCIUM SERPL-MCNC: 8 MG/DL (ref 7.8–10.44)
CHLORIDE SERPL-SCNC: 96 MMOL/L (ref 98–107)
CO2 SERPL-SCNC: 32 MMOL/L (ref 23–31)
CREAT CL PREDICTED SERPL C-G-VRATE: 0 ML/MIN (ref 70–130)
EOSINOPHIL # BLD AUTO: 0.1 THOU/UL (ref 0–0.7)
EOSINOPHIL NFR BLD AUTO: 0.6 % (ref 0–10)
GLUCOSE SERPL-MCNC: 242 MG/DL (ref 83–110)
HGB BLD-MCNC: 9.5 G/DL (ref 12–16)
LYMPHOCYTES # BLD: 0.8 THOU/UL (ref 1.2–3.4)
LYMPHOCYTES NFR BLD AUTO: 9.3 % (ref 21–51)
MCH RBC QN AUTO: 26.4 PG (ref 27–31)
MCV RBC AUTO: 83.9 FL (ref 78–98)
MONOCYTES # BLD AUTO: 0.7 THOU/UL (ref 0.11–0.59)
MONOCYTES NFR BLD AUTO: 7.5 % (ref 0–10)
NEUTROPHILS # BLD AUTO: 7.3 THOU/UL (ref 1.4–6.5)
NEUTROPHILS NFR BLD AUTO: 82.2 % (ref 42–75)
PLATELET # BLD AUTO: 289 THOU/UL (ref 130–400)
POTASSIUM SERPL-SCNC: 2.9 MMOL/L (ref 3.5–5.1)
RBC # BLD AUTO: 3.59 MILL/UL (ref 4.2–5.4)
SODIUM SERPL-SCNC: 138 MMOL/L (ref 136–145)
WBC # BLD AUTO: 8.9 THOU/UL (ref 4.8–10.8)

## 2021-02-10 RX ADMIN — POTASSIUM BICARBONATE SCH MEQ: 782 TABLET, EFFERVESCENT ORAL at 07:56

## 2021-02-10 RX ADMIN — POTASSIUM BICARBONATE SCH MEQ: 782 TABLET, EFFERVESCENT ORAL at 13:36

## 2021-02-10 RX ADMIN — Medication SCH ML: at 21:05

## 2021-02-10 RX ADMIN — VANCOMYCIN HYDROCHLORIDE SCH MLS: 1 INJECTION, SOLUTION INTRAVENOUS at 21:02

## 2021-02-10 RX ADMIN — Medication SCH MLS: at 11:34

## 2021-02-10 RX ADMIN — POTASSIUM BICARBONATE SCH MEQ: 782 TABLET, EFFERVESCENT ORAL at 20:58

## 2021-02-10 RX ADMIN — Medication SCH ML: at 07:54

## 2021-02-10 NOTE — CON
DATE OF CONSULTATION:  02/10/2021



REASON FOR CONSULTATION:  Arrhythmia management.



CARDIOLOGIST:  Jj Donnelly MD.



HISTORY OF PRESENT ILLNESS:  I am seeing Ms. Garza as an electrophysiology

consultant at [facility].  She is currently intubated and sedated.  
Her

problem list is as follows: 

1. Cardiac arrest, attributed to hyperkalemia and bradycardia with CPR achieving

ROSC and requiring external pacing. 

2. Recent COVID-19 infection in 12/2020.

3. History of viral cardiomyopathy with recovered ejection fraction.

    a. LVEF is low at 20%, most recently 50% on 12/08, and 55% to 60% on 
01/30/2021.

4. Valvular disease; mild-to-moderate MR, aortic sclerosis, mild TR, moderate

pulmonic regurgitation. 

5. Gastric ulcer.

6. History of atrial fibrillation, status post PVAI in 2015.

7. Left atrial appendage closure via Watchman in 2016, documented closed by 45-
day

DAVIE. 

8. Coronary artery disease with prior bypass.

9. Prior mitral valve repair.

10. Diabetes.

11. Hypertension.

12. Dementia.

13. Hypothyroidism.

14. Sepsis with probable pneumonia.

15. Gastric ulcer with GI bleed and hemoglobin of 6.2 while on oral 
anticoagulation,

requiring transfusion. 



SUBJECTIVE:  Ms. Garza presented to the emergency room with nausea and 
weakness.

While she was being evaluated by the ER doctor, she became bradycardic and

unresponsive.  CPR was initiated and she was intubated.  She remained severely

bradycardic and transcutaneous pacing was required.  Her potassium was 6.7.  She

underwent dialysis to correct her hyperkalemia.  Normal sinus rhythm was then

restored and also corrected the initial left bundle-branch block that was 
present on

admission.  She remains intubated and sedated in the ICU. 



PAST SURGICAL HISTORY:  

1. CABG.

2. Watchman implant.

3. PVI.

4. Hysterectomy.

5. Cholecystectomy.

6. Right wrist surgery.



HOME MEDICATION LIST:  

1. Ajay packet twice a day.

2. Pulmicort p.r.n.

3. Alogliptin benzoate daily.

4. Ventolin p.r.n.

5. Albuterol p.r.n.

6. Multaq 400 mg p.o. b.i.d.

7. Insulin 25 units nightly.

8. Digoxin 125 mcg daily.

9. Gabapentin 200 mg p.o. t.i.d.

10. Furosemide 40 mg daily.

11. Diltiazem 120 mg p.o. t.i.d.

12. Protonix 40 mg p.o. b.i.d.

13. Levofloxacin 500 mg daily.

14. Humalog sliding scale.

15. Metolazone 5 mg daily.

16. Magnesium 400 mg daily.

17. Cozaar 12.5 mg daily.

18. Levothyroxine 125 mcg daily.

19. Potassium chloride 60 mEq daily.

20. MiraLAX as needed.

21. Metformin 500 mg b.i.d.

22. Spironolactone 12.5 mg daily.

23. Senna p.r.n.

24. Tramadol p.r.n.

25. Tylenol p.r.n.



ALLERGIES:  

1. ANESTHETICS, AMIDE AND CAROL TYPES.

2. ATORVASTATIN.

3. CODEINE.

4. OMEPRAZOLE.

5. SIMVASTATIN.



SOCIAL HISTORY:  Tobacco use, stopped 10 years ago by chart review.  Negative 
for

alcohol. 



FAMILY HISTORY:  Could not be obtained.



REVIEW OF SYSTEMS:  Could not be obtained.



OBJECTIVE:  VITAL SIGNS:  Temperature 98.7 degrees Fahrenheit, pulse 93, blood

pressure 116/63, respirations 18, and oxygen 100% via ventilated respirations on

SIMV. 



TELEMETRY AND EKG:  Initial EKG is suggestive of sinus bradycardia with low

amplitude P waves with ventricular rate in the high 50s.  Initial left bundle-
branch

block is noted.  Now, she has been largely rate controlled atrial fibrillation

between 90 and 110 beats per minute and QRS has normalized with correction of 
her

electrolyte issues and acidosis. 



Echocardiogram on 01/30/2021, LVEF 55% to 60%.  Grade 2/3 diastolic dysfunction.



LABORATORY DATA:  On admission potassium 6.7 and now 3.3.  Magnesium 1.6 on

admission, most recently 1.8.  Digoxin level on admission 2.76.  COVID test 
negative

on admission. 



PLAN AND RECOMMENDATIONS:  Ms. Garza is a 75-year-old woman with a history of

tachy-nathaniel syndrome, atrial fibrillation with prior ablation, requiring

antiarrhythmic therapy with Multaq, diltiazem, and digoxin recently.  Her EKG on

admission is suggestive of sinus bradycardia, even though P waves are difficult 
to

visualize, this is not uncommon after left atrial ablations. The QT was

normal on admission at 420 milliseconds.    There was concern for

some mild dig toxicity.  She was hypotensive and symptomatic with bradycardia,

eventually having a cardiac arrest, requiring CPR and transvenous pacing.  After

emergent dialysis to correct her elevated potassium, her rhythm stabilized, 
although

now she remains in atrial fibrillation, albeit with adequate rate control.  



She remains intubated and sedated at this time, although weaning trials are 
being considered.



She has previously undergone left atrial appendage closure with a Watchman, and 
from an

electrophysiology and arrhythmia perspective, she does not require ongoing oral

anticoagulation therapy other than aspirin alone as this was documented closed 
45

days post Watchman implant.  

Given her situation with her tachy-nathaniel syndrome and the need for multiple 
medications to 

adequately suppress her atrial fibrillation, she may require a dual-chamber 
pacemaker to 

prevent bradycardia.   We will discuss this with her family if available.  

We will continue to follow her throughout her hospitalization. 







Job ID:  [id number]



MTDD

## 2021-02-10 NOTE — PRG
DATE OF SERVICE:  02/10/2021



SUBJECTIVE:  Ms. Garza remains ventilated.



OBJECTIVE:  VITAL SIGNS:  Her FiO2 is at 40, respiratory rate is 12, heart rate is

93, blood pressure __________ 

LUNGS:  Clear. 

HEART:  Irregular rhythm. 

ABDOMEN:  Soft. 

EXTREMITIES:  Without edema. 



Intake and outputs negative 1432.



IMPRESSION:  

1. Atrial fibrillation.

2. Status post cardiac arrest associated with __________.

3. Extreme weakness and deconditioning since COVID.



PLAN:  Continue support slow weaning.  I met with the , answered all his

questions. 







Job ID:  [id number]

## 2021-02-10 NOTE — PRG
DATE OF SERVICE:  02/10/2021



OBJECTIVE:  VITAL SIGNS:  The patient noted with the following vital signs; blood

pressure 117/56, pulse of 90, O2 saturation of 100%. 

HEENT:  Unremarkable. 

CARDIOVASCULAR SYSTEM:  First and second heart sounds were heard. 

RESPIRATORY SYSTEM:  __________ vented sounds. 

DIGESTIVE SYSTEM:  Revealed a benign abdomen. 

EXTREMITIES:  Showed resolved peripheral edema.



LABORATORY INVESTIGATION:  Showed a hemoglobin of 9.5.  Chemistries showed

__________ bicarb of 32. 



IMPRESSION:  

1. Hypokalemia in the context of diuresis, compounded by cellular shift of potassium

due to alkalosis. 

2. Contraction alkalosis.

3. Hyperkalemia, resolved.



PLAN:  

1. Discontinue diuretics.

2. Replete potassium.

3. Further management to be dependent on the clinical course.







Job ID:  [id number]

## 2021-02-10 NOTE — RAD
Portable chest:



HISTORY: CHF, infiltrate



COMPARISON:  2/9/2021



FINDINGS:ET tube and NG tube are unchanged. Lungs are well aerated. No confluent infiltrate or consol
idation. No evidence of vascular congestion or edema.



IMPRESSION: No acute finding



Reported By: Yazan Pickard 

Electronically Signed:  2/10/2021 8:40 AM

## 2021-02-10 NOTE — PDOC.CPN
- Subjective


Date: 02/10/21


Time: 07:48


Interval history: 


Remains sedated and intubated. 





- Review of Systems


ROS unobtainable: due to endotracheal tube





- Objective


Allergies/Adverse Reactions: 


                                    Allergies











Allergy/AdvReac Type Severity Reaction Status Date / Time


 


Anesthetics - Amide Type - Allergy   Verified 12/02/20 18:59





Select A     


 


Anesthetics - Miriam Type- Allergy   Verified 12/02/20 18:59





Parabens     


 


atorvastatin Allergy   Verified 01/29/21 16:17


 


codeine [Codeine] Allergy   Verified 01/29/21 16:16


 


esomeprazole Allergy   Verified 01/29/21 16:17


 


levofloxacin [From Levaquin] Allergy   Verified 01/29/21 16:16


 


simvastatin Allergy   Verified 01/29/21 16:17











Visit Medications: 


                               Current Medications





Acetaminophen (Acetaminophen 325 Mg Tab)  650 mg PO Q8H PRN


   PRN Reason: Headache/Fever/Mild Pain (1-3)


Doxycycline Hyclate (Doxycycline 100 Mg Cap)  100 mg PO BID [facility]


   Last Admin: 02/09/21 21:12 Dose:  100 mg


   Documented by: 


Furosemide (Furosemide 40 Mg/4 Ml Vial)  40 mg SLOW IVP 0600,1400 [facility]


   Last Admin: 02/10/21 05:52 Dose:  40 mg


   Documented by: 


Fentanyl (Fentanyl Cadd)  100 mls @ 0 mls/hr IV INF SILVIANO; Protocol


   Stop: 03/08/21 17:00


   Last Admin: 02/09/21 15:23 Dose:  100 mls


   Documented by: 


Norepinephrine Bitartrate (Levophed)  250 mls @ 0 mls/hr IVPB INF S[facility]O; Protocol


   Last Admin: 02/09/21 02:28 Dose:  250 mls


   Documented by: 


Potassium Chloride 40 meq/ (Device)  100 mls @ 25 mls/hr IVPB NOW [facility]


   Stop: 02/10/21 10:14


   Last Admin: 02/10/21 06:16 Dose:  100 mls


   Documented by: 


Levothyroxine Sodium (Levothyroxine Sodium 125 Mcg Tab)  125 mcg PO 0600 [facility]


   Last Admin: 02/10/21 05:52 Dose:  125 mcg


   Documented by: 


Lorazepam (Lorazepam 2 Mg/Ml Vial)  2 mg SLOW IVP Q1H PRN


   PRN Reason: Breakthrough agitation


   Stop: 03/08/21 17:00


   Last Admin: 02/08/21 19:02 Dose:  2 mg


   Documented by: 


Miscellaneous Medication (Electrolyte Replacement Protocol)  1 each IVPB ASDIR 

[facility]


Morphine Sulfate (Morphine 2 Mg/Ml Vial)  2 mg SLOW IVP Q1H PRN


   PRN Reason: Breakthrough Pain/Agitation


   Stop: 03/08/21 17:00


Discontinue Previous Narcotic Pain Medications And Benzodiazepines  1 each FS 

.ONE [facility]


   Stop: 03/08/21 17:00


Ondansetron HCl (Ondansetron Pf 4 Mg/2 Ml Vial)  4 mg IVP Q6H PRN


   PRN Reason: Nausea/Vomiting


Pantoprazole Sodium (Pantoprazole 40 Mg Vial)  40 mg IVP BID [facility]


   Last Admin: 02/09/21 21:12 Dose:  40 mg


   Documented by: 


Potassium Bicarbonate/Citric Acid (Potassium Bicarbonate/Cit Ac 20 Meq Tab)  40 

meq PO Q6H [facility]


   Stop: 02/11/21 08:01


Propofol (Propofol 1,000 Mg/100 Ml Vial)  1,000 mg IV INF PRN; Protocol


   PRN Reason: TO ACHIEVE GOAL RASS


   Stop: 03/08/21 17:00


Propofol (Propofol Bolus 1,000 Mg/100 Ml Vial)  20 mg IV Q5MIN PRN


   PRN Reason: BREAKTHROUGH AGITATION


   Stop: 03/08/21 17:00


Rifampin (Rifampin 300 Mg Cap)  300 mg PO 1000,2200 [facility]


   Last Admin: 02/09/21 21:20 Dose:  300 mg


   Documented by: 


Sodium Chloride (Flush - Normal Saline 10 Ml Syringe)  10 ml IVF Q12HR [facility]


   Last Admin: 02/09/21 21:15 Dose:  10 ml


   Documented by: 


Sodium Chloride (Flush - Normal Saline 10 Ml Syringe)  10 ml IVF PRN PRN


   PRN Reason: Saline Flush








Vital Signs & Weight: 


                                   Vital Signs











  Pulse Pulse Ox


 


 02/10/21 07:10   100


 


 02/10/21 07:08  105 H 


 


 02/10/21 03:12  121 H 


 


 02/09/21 23:01  107 H 








                                        











Admit Weight                   150 lb 9.211 oz


 


Weight                         150 lb 9.211 oz

















- Physical Exam


General: other (S/I)


HEENT: mucus membranes moist


Neck: supple neck


Cardiac: irregularly regular


Lungs: normal breath sounds


Neuro: no lateralizing findings


Abdomen: active bowel sounds


Extremities: 1+ LE edema


Skin: clear


Musculoskeletal: normal range of motion





- Labs


Result Diagrams: 


                                 02/10/21 04:30





                                 02/10/21 04:30


                                  Troponin/CKMB











Troponin I  0.685 ng/mL (< 0.028)  H*  02/07/21  03:38    














- Telemetry


Supraventricular conduction: atrial fibrillation





- Assessment/Plan


Assessment/Plan: 





1. Hyperkalemia, resolved.


2. Cardiac arrest secondary to hyperkalemia


3. Hypokalemia


4. Paroxysmal afib


5. Recent COVID-19 infection


6. 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


7. Anemia, 2a to GI bleed.


8. Presence of a watchman device. 








PLAN:


- Continue supportive care.


- Replace K. 


- Currently lenient rate control

## 2021-02-10 NOTE — PDOC.HOSPP
- Subjective


Encounter Date: 02/10/21


Encounter Time: 12:00


Subjective: 


is on vent and mild sedation


responds well to verbal questions





- Objective


Vital Signs & Weight: 


                             Vital Signs (12 hours)











  Temp Pulse Resp Pulse Ox


 


 02/10/21 16:00  98.7 F   12 


 


 02/10/21 15:11   89   100


 


 02/10/21 14:00    12 


 


 02/10/21 12:00  98.5 F   14 


 


 02/10/21 10:13   93  


 


 02/10/21 10:00    13 


 


 02/10/21 08:10     100


 


 02/10/21 08:00  98.7 F   17 


 


 02/10/21 07:10     100


 


 02/10/21 07:08   105 H  








                                     Weight











Admit Weight                   162 lb 0.636 oz


 


Weight                         150 lb 9.211 oz











                            Most Recent Monitor Data











Heart Rate from ECG            93


 


NIBP                           96/59


 


NIBP BP-Mean                   71


 


Respiration from ECG           20


 


SpO2                           100














I&O: 


                                        











 02/09/21 02/10/21 02/11/21





 06:59 06:59 06:59


 


Intake Total 490.5 1002.7 350


 


Output Total 3765 2435 1135


 


Balance -3274.5 -1432.3 -785











Result Diagrams: 


                                 02/10/21 04:30





                                 02/10/21 04:30





Hospitalist ROS





- Medication


Medications: 


Active Medications











Generic Name Dose Route Start Last Admin





  Trade Name Freq  PRN Reason Stop Dose Admin


 


Doxycycline Hyclate  100 mg  02/09/21 21:00  02/10/21 07:53





  Doxycycline 100 Mg Cap  PO   100 mg





  BID SILVIANO   Administration


 


Fentanyl  100 mls @ 0 mls/hr  02/06/21 17:00  02/10/21 11:34





  Fentanyl Cadd  IV  03/08/21 17:00  100 mls





  INF SILVIANO   Administration





  Protocol  





  Per Protocol  


 


Norepinephrine Bitartrate  250 mls @ 0 mls/hr  02/07/21 12:15  02/09/21 02:28





  Levophed  IVPB   250 mls





  INF SILVIANO   Administration





  Protocol  





  Titrate  


 


Levothyroxine Sodium  125 mcg  02/07/21 06:00  02/10/21 05:52





  Levothyroxine Sodium 125 Mcg Tab  PO   125 mcg





  0600 SIL[facility]ANO   Administration


 


Lorazepam  2 mg  02/06/21 17:00  02/08/21 19:02





  Lorazepam 2 Mg/Ml Vial  SLOW IVP  03/08/21 17:00  2 mg





  Q1H PRN   Administration





  Breakthrough agitation  


 


Potassium Bicarbonate/Citric Acid  40 meq  02/10/21 08:00  02/10/21 13:36





  Potassium Bicarbonate/Cit Ac 20 Meq Tab  PO  02/11/21 08:01  40 meq





  Q6H SILVIANO   Administration


 


Rifampin  300 mg  02/09/21 22:00  02/10/21 09:31





  Rifampin 300 Mg Cap  PO   300 mg





  1000,2200 SIL[facility]ANO   Administration


 


Sodium Chloride  10 ml  02/07/21 09:00  02/10/21 07:54





  Flush - Normal Saline 10 Ml Syringe  IVF   10 ml





  Q12HR SIL[facility]ANO   Administration














Hospitalist Exam


Vitals: 


                             Vital Signs (12 hours)











  Temp Pulse Resp Pulse Ox


 


 02/10/21 16:00  98.7 F   12 


 


 02/10/21 15:11   89   100


 


 02/10/21 14:00    12 


 


 02/10/21 12:00  98.5 F   14 


 


 02/10/21 10:13   93  


 


 02/10/21 10:00    13 


 


 02/10/21 08:10     100


 


 02/10/21 08:00  98.7 F   17 


 


 02/10/21 07:10     100


 


 02/10/21 07:08   105 H  








                                     Weight











Admit Weight                   162 lb 0.636 oz


 


Weight                         150 lb 9.211 oz











                            Most Recent Monitor Data











Heart Rate from ECG            93


 


NIBP                           96/59


 


NIBP BP-Mean                   71


 


Respiration from ECG           20


 


SpO2                           100














General Appearance: awake alert


Eye: PERRL, anicteric sclera


ENT: no oropharyngeal lesions, moist mucosa


Neck: supple, no JVD


Heart: no murmur, irregular


Respiratory: no wheezes, no ronchi, rales


Gastrointestinal: soft, non-tender, non-distended, normal bowel sounds


Extremities: no cyanosis, no edema


Neurological: cranial nerve grossly intact, no focal deficits





Hosp A/P


(1) Acute respiratory failure with hypoxia


Code(s): J96.01 - ACUTE RESPIRATORY FAILURE WITH HYPOXIA   Status: Acute   





(2) ARJUN (acute kidney injury)


Code(s): N17.9 - ACUTE KIDNEY FAILURE, UNSPECIFIED   Status: Resolved   





(3) Hyperkalemia


Code(s): E87.5 - HYPERKALEMIA   Status: Resolved   





(4) CAD (coronary artery disease)


Code(s): I25.10 - ATHSCL HEART DISEASE OF NATIVE CORONARY ARTERY W/O ANG PCTRS  

Status: Chronic   


Qualifiers: 


   Coronary Disease-Associated Artery/Lesion type: bypass graft   Native vs. 

transplanted heart: native heart   Associated angina: without angina   Qualified

Code(s): I25.810 - Atherosclerosis of coronary artery bypass graft(s) without 

angina pectoris   





(5) DM type 2 (diabetes mellitus, type 2)


Status: Chronic   


Qualifiers: 


   Diabetes mellitus long term insulin use: with long term use 





(6) HTN (hypertension)


Code(s): I10 - ESSENTIAL (PRIMARY) HYPERTENSION   Status: Chronic   


Qualifiers: 


   Hypertension type: essential hypertension   Qualified Code(s): I10 - 

Essential (primary) hypertension   





(7) Dyslipidemia


Code(s): E78.5 - HYPERLIPIDEMIA, UNSPECIFIED   Status: Chronic   





(8) h/o covid 19 virus infection


Status: Chronic   





(9) A-fib


Code(s): I48.91 - UNSPECIFIED ATRIAL FIBRILLATION   Status: Chronic   


Qualifiers: 


   Atrial fibrillation type: paroxysmal   Qualified Code(s): I48.0 - Paroxysmal 

atrial fibrillation   





(10) Gastric ulcer


Code(s): K25.9 - GASTRIC ULCER, UNSP AS ACUTE OR CHRONIC, W/O HEMOR OR PERF   

Status: Acute   


Qualifiers: 


   Gastric ulcer chronicity: acute 





- Plan





had brief cardiac arrest with cpr done in ER for electrolyte issues and volume 

depletion.


had one session of emergent HD done for hyperkalemia and arjun on admission, renal

function is at baseline. Replace potassium. DC lasix.


weaning per pulm advice


she has had multiple hospitalizations/rehab in [facility] in the last month and half, has not ambulated yet in those many days per 

.


severe deconditioning


continue protonix bid, synthroid, taper and dc levophed. Is on vanc and cefepime

based on cultures, has gross purulence on her sacral area with stage 2-3 decub 

present on admission.


echo in jan 2021 showed ef of 55% with rvsp of 65, she had covid pna and was 

diagnosed in dec 2020,  had it too and has recovered.


hemostable


d/w  at bedside 2/8/21, 2/10/21.

## 2021-02-11 LAB
ANION GAP SERPL CALC-SCNC: 17 MMOL/L (ref 10–20)
BASOPHILS # BLD AUTO: 0 THOU/UL (ref 0–0.2)
BASOPHILS NFR BLD AUTO: 0.1 % (ref 0–1)
BUN SERPL-MCNC: 42 MG/DL (ref 9.8–20.1)
CALCIUM SERPL-MCNC: 8.2 MG/DL (ref 7.8–10.44)
CHLORIDE SERPL-SCNC: 95 MMOL/L (ref 98–107)
CO2 SERPL-SCNC: 33 MMOL/L (ref 23–31)
CREAT CL PREDICTED SERPL C-G-VRATE: 68 ML/MIN (ref 70–130)
EOSINOPHIL # BLD AUTO: 0.2 THOU/UL (ref 0–0.7)
EOSINOPHIL NFR BLD AUTO: 1.6 % (ref 0–10)
GLUCOSE SERPL-MCNC: 335 MG/DL (ref 83–110)
HGB BLD-MCNC: 9.5 G/DL (ref 12–16)
LYMPHOCYTES # BLD: 1.3 THOU/UL (ref 1.2–3.4)
LYMPHOCYTES NFR BLD AUTO: 11.5 % (ref 21–51)
MCH RBC QN AUTO: 26.6 PG (ref 27–31)
MCV RBC AUTO: 85.5 FL (ref 78–98)
MONOCYTES # BLD AUTO: 1 THOU/UL (ref 0.11–0.59)
MONOCYTES NFR BLD AUTO: 9 % (ref 0–10)
NEUTROPHILS # BLD AUTO: 8.6 THOU/UL (ref 1.4–6.5)
NEUTROPHILS NFR BLD AUTO: 77.8 % (ref 42–75)
PLATELET # BLD AUTO: 369 THOU/UL (ref 130–400)
POTASSIUM SERPL-SCNC: 5 MMOL/L (ref 3.5–5.1)
RBC # BLD AUTO: 3.57 MILL/UL (ref 4.2–5.4)
SODIUM SERPL-SCNC: 140 MMOL/L (ref 136–145)
WBC # BLD AUTO: 11 THOU/UL (ref 4.8–10.8)

## 2021-02-11 RX ADMIN — POTASSIUM BICARBONATE SCH MEQ: 782 TABLET, EFFERVESCENT ORAL at 02:53

## 2021-02-11 RX ADMIN — VANCOMYCIN HYDROCHLORIDE SCH MLS: 1 INJECTION, SOLUTION INTRAVENOUS at 08:21

## 2021-02-11 RX ADMIN — Medication SCH ML: at 08:22

## 2021-02-11 RX ADMIN — VANCOMYCIN HYDROCHLORIDE SCH MLS: 1 INJECTION, SOLUTION INTRAVENOUS at 20:53

## 2021-02-11 RX ADMIN — Medication SCH MLS: at 07:07

## 2021-02-11 RX ADMIN — POTASSIUM BICARBONATE SCH MEQ: 782 TABLET, EFFERVESCENT ORAL at 08:23

## 2021-02-11 RX ADMIN — Medication SCH ML: at 20:56

## 2021-02-11 NOTE — PDOC.CPN
- Subjective


Date: 02/11/21


Time: 16:28


Interval history: 





Remains intubated, sedated. 





- Review of Systems


ROS unobtainable: due to endotracheal tube





- Objective


Allergies/Adverse Reactions: 


                                    Allergies











Allergy/AdvReac Type Severity Reaction Status Date / Time


 


Anesthetics - Amide Type - Allergy   Verified 12/02/20 18:59





Select A     


 


Anesthetics - Miriam Type- Allergy   Verified 12/02/20 18:59





Parabens     


 


atorvastatin Allergy   Verified 01/29/21 16:17


 


codeine [Codeine] Allergy   Verified 01/29/21 16:16


 


esomeprazole Allergy   Verified 01/29/21 16:17


 


levofloxacin [From Levaquin] Allergy   Verified 01/29/21 16:16


 


simvastatin Allergy   Verified 01/29/21 16:17











Visit Medications: 


                               Current Medications





Acetaminophen (Acetaminophen 325 Mg Tab)  650 mg PO Q8H PRN


   PRN Reason: Headache/Fever/Mild Pain (1-3)


Fentanyl (Fentanyl Cadd)  100 mls @ 0 mls/hr IV INF [facility]; Protocol


   Stop: 03/08/21 17:00


   Last Admin: 02/11/21 07:07 Dose:  100 mls


   Documented by: 


Norepinephrine Bitartrate (Levophed)  250 mls @ 0 mls/hr IVPB INF [facility]; Protocol


   Last Admin: 02/09/21 02:28 Dose:  250 mls


   Documented by: 


Vancomycin HCl 1 gm/ Device  200 mls @ 200 mls/hr IVPB 0800,2000 [facility]


   Last Admin: 02/11/21 08:21 Dose:  200 mls


   Documented by: 


Cefepime HCl 1 gm/ Sodium (Chloride)  100 mls @ 200 mls/hr IVPB Q12HR [facility]


   Last Admin: 02/11/21 08:22 Dose:  100 mls


   Documented by: 


Dexmedetomidine HCl 400 mcg/ (Sodium Chloride)  100 mls @ 0 mls/hr IVPB INF SIL[facility]ANO;

Protocol


   Last Admin: 02/11/21 10:06 Dose:  100 mls


   Documented by: 


Levothyroxine Sodium (Levothyroxine Sodium 125 Mcg Tab)  125 mcg PO 0600 [facility]


   Last Admin: 02/11/21 06:14 Dose:  125 mcg


   Documented by: 


Lorazepam (Lorazepam 2 Mg/Ml Vial)  2 mg SLOW IVP Q1H PRN


   PRN Reason: Breakthrough agitation


   Stop: 03/08/21 17:00


   Last Admin: 02/08/21 19:02 Dose:  2 mg


   Documented by: 


Miscellaneous Medication (Electrolyte Replacement Protocol)  1 each IVPB ASDIR 

[facility]


Morphine Sulfate (Morphine 2 Mg/Ml Vial)  2 mg SLOW IVP Q1H PRN


   PRN Reason: Breakthrough Pain/Agitation


   Stop: 03/08/21 17:00


Discontinue Previous Narcotic Pain Medications And Benzodiazepines  1 each FS 

.ONE [facility]


   Stop: 03/08/21 17:00


Ondansetron HCl (Ondansetron Pf 4 Mg/2 Ml Vial)  4 mg IVP Q6H PRN


   PRN Reason: Nausea/Vomiting


Pantoprazole Sodium (Pantoprazole 40 Mg Tab)  40 mg PO BID [facility]


   Last Admin: 02/11/21 08:22 Dose:  40 mg


   Documented by: 


Propofol (Propofol 1,000 Mg/100 Ml Vial)  1,000 mg IV INF PRN; Protocol


   PRN Reason: TO ACHIEVE GOAL RASS


   Stop: 03/08/21 17:00


Propofol (Propofol Bolus 1,000 Mg/100 Ml Vial)  20 mg IV Q5MIN PRN


   PRN Reason: BREAKTHROUGH AGITATION


   Stop: 03/08/21 17:00


Sodium Chloride (Flush - Normal Saline 10 Ml Syringe)  10 ml IVF Q12HR [facility]


   Last Admin: 02/11/21 08:22 Dose:  10 ml


   Documented by: 


Sodium Chloride (Flush - Normal Saline 10 Ml Syringe)  10 ml IVF PRN PRN


   PRN Reason: Saline Flush








Vital Signs & Weight: 


                                   Vital Signs











  Temp Pulse Resp Pulse Ox


 


 02/11/21 16:00  98.4 F   12 


 


 02/11/21 14:20   89  


 


 02/11/21 14:00    17 


 


 02/11/21 12:00  98.5 F   12 


 


 02/11/21 10:34   93  


 


 02/11/21 10:00    12 


 


 02/11/21 08:00  98.9 F   13 


 


 02/11/21 07:35     100


 


 02/11/21 06:12   93   100








                                        











Admit Weight                   162 lb 0.636 oz


 


Weight                         149 lb 14.629 oz

















- Physical Exam


General: other (S/I)


HEENT: normocephaly


Neck: supple neck


Cardiac: regular rate and rhythm


Lungs: normal breath sounds


Neuro: grossly intact


Abdomen: active bowel sounds


Extremities: no edema


Skin: clear


Musculoskeletal: no pain





- Labs


Result Diagrams: 


                                 02/11/21 04:00





                                 02/11/21 04:00


                                  Troponin/CKMB











Troponin I  0.685 ng/mL (< 0.028)  H*  02/07/21  03:38    














- Telemetry


Sinus rhythms and dysrhythmias: sinus rhythm





- Assessment/Plan


Assessment/Plan: 





1. Hyperkalemia, resolved.


2. Cardiac arrest secondary to hyperkalemia


3. Hypokalemia


4. Paroxysmal afib


5. Recent COVID-19 infection


6. 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


7. Anemia, 2a to GI bleed.


8. Presence of a watchman device. 


9. Tachy/Alfredito syndrome. 








PLAN:


- Continue supportive care.


- Agree she will likely need a device as she goes tachycardic in afib and 

bradycardic with medications. 


- Most likely her severe bradycardia was more induced by hyperkalemia. 


- Agree with PPM/AICD per EP

## 2021-02-11 NOTE — PDOC.HOSPP
- Subjective


Encounter Date: 02/11/21


Encounter Time: 11:45


Subjective: 


awakens easily, is on vent, mild sedation


follows minimal verbal stimuli with shaking her legs and grasping hand





- Objective


Vital Signs & Weight: 


                             Vital Signs (12 hours)











  Temp Pulse Resp Pulse Ox


 


 02/11/21 16:00  98.4 F   12 


 


 02/11/21 14:20   89  


 


 02/11/21 14:00    17 


 


 02/11/21 12:00  98.5 F   12 


 


 02/11/21 10:34   93  


 


 02/11/21 10:00    12 


 


 02/11/21 08:00  98.9 F   13 


 


 02/11/21 07:35     100


 


 02/11/21 06:12   93   100








                                     Weight











Admit Weight                   162 lb 0.636 oz


 


Weight                         149 lb 14.629 oz











                            Most Recent Monitor Data











Heart Rate from ECG            89


 


NIBP                           122/57


 


NIBP BP-Mean                   78


 


Respiration from ECG           21


 


SpO2                           100














I&O: 


                                        











 02/10/21 02/11/21 02/12/21





 06:59 06:59 06:59


 


Intake Total 1002.7 1228.3 570


 


Output Total 2435 1680 687


 


Balance -1432.3 -451.7 -117











Result Diagrams: 


                                 02/11/21 04:00





                                 02/11/21 04:00





Hospitalist ROS





- Medication


Medications: 


Active Medications











Generic Name Dose Route Start Last Admin





  Trade Name Shanon  PRN Reason Stop Dose Admin


 


Fentanyl  100 mls @ 0 mls/hr  02/06/21 17:00  02/11/21 07:07





  Fentanyl Cadd  IV  03/08/21 17:00  100 mls





  INF SILVIANO   Administration





  Protocol  





  Per Protocol  


 


Norepinephrine Bitartrate  250 mls @ 0 mls/hr  02/07/21 12:15  02/09/21 02:28





  Levophed  IVPB   250 mls





  INF SILVIANO   Administration





  Protocol  





  Titrate  


 


Vancomycin HCl 1 gm/ Device  200 mls @ 200 mls/hr  02/10/21 20:00  02/11/21 

08:21





  IVPB   200 mls





  0800,2000 SILVIANO   Administration


 


Cefepime HCl 1 gm/ Sodium  100 mls @ 200 mls/hr  02/10/21 21:00  02/11/21 08:22





  Chloride  IVPB   100 mls





  Q12HR SILVIANO   Administration


 


Dexmedetomidine HCl 400 mcg/  100 mls @ 0 mls/hr  02/11/21 10:00  02/11/21 10:06





  Sodium Chloride  IVPB   100 mls





  INF SILVIANO   Administration





  Protocol  





  Per Protocol  


 


Levothyroxine Sodium  125 mcg  02/07/21 06:00  02/11/21 06:14





  Levothyroxine Sodium 125 Mcg Tab  PO   125 mcg





  0600 SILVIANO   Administration


 


Lorazepam  2 mg  02/06/21 17:00  02/08/21 19:02





  Lorazepam 2 Mg/Ml Vial  SLOW IVP  03/08/21 17:00  2 mg





  Q1H PRN   Administration





  Breakthrough agitation  


 


Pantoprazole Sodium  40 mg  02/10/21 21:00  02/11/21 08:22





  Pantoprazole 40 Mg Tab  PO   40 mg





  BID SILVIANO   Administration


 


Sodium Chloride  10 ml  02/07/21 09:00  02/11/21 08:22





  Flush - Normal Saline 10 Ml Syringe  IVF   10 ml





  Q12HR SILVIANO   Administration














Hospitalist Exam


Vitals: 


                             Vital Signs (12 hours)











  Temp Pulse Resp Pulse Ox


 


 02/11/21 16:00  98.4 F   12 


 


 02/11/21 14:20   89  


 


 02/11/21 14:00    17 


 


 02/11/21 12:00  98.5 F   12 


 


 02/11/21 10:34   93  


 


 02/11/21 10:00    12 


 


 02/11/21 08:00  98.9 F   13 


 


 02/11/21 07:35     100


 


 02/11/21 06:12   93   100








                                     Weight











Admit Weight                   162 lb 0.636 oz


 


Weight                         149 lb 14.629 oz











                            Most Recent Monitor Data











Heart Rate from ECG            89


 


NIBP                           122/57


 


NIBP BP-Mean                   78


 


Respiration from ECG           21


 


SpO2                           100














Eye: PERRL, anicteric sclera


ENT: no oropharyngeal lesions, moist mucosa


Neck: supple, no JVD


Heart: RRR, no murmur


Respiratory: no wheezes, no rales, rhonchi


Gastrointestinal: soft, non-tender, non-distended, normal bowel sounds


Extremities: no cyanosis, no edema


Neurological: cranial nerve grossly intact, no focal deficits





Hosp A/P


(1) Acute respiratory failure with hypoxia


Code(s): J96.01 - ACUTE RESPIRATORY FAILURE WITH HYPOXIA   Status: Acute   





(2) ARJUN (acute kidney injury)


Code(s): N17.9 - ACUTE KIDNEY FAILURE, UNSPECIFIED   Status: Resolved   





(3) Hyperkalemia


Code(s): E87.5 - HYPERKALEMIA   Status: Resolved   





(4) CAD (coronary artery disease)


Code(s): I25.10 - ATHSCL HEART DISEASE OF NATIVE CORONARY ARTERY W/O ANG PCTRS  

Status: Chronic   


Qualifiers: 


   Coronary Disease-Associated Artery/Lesion type: bypass graft   Native vs. 

transplanted heart: native heart   Associated angina: without angina   Qualified

Code(s): I25.810 - Atherosclerosis of coronary artery bypass graft(s) without an

gerson pectoris   





(5) DM type 2 (diabetes mellitus, type 2)


Status: Chronic   


Qualifiers: 


   Diabetes mellitus long term insulin use: with long term use 





(6) HTN (hypertension)


Code(s): I10 - ESSENTIAL (PRIMARY) HYPERTENSION   Status: Chronic   


Qualifiers: 


   Hypertension type: essential hypertension   Qualified Code(s): I10 - 

Essential (primary) hypertension   





(7) Dyslipidemia


Code(s): E78.5 - HYPERLIPIDEMIA, UNSPECIFIED   Status: Chronic   





(8) h/o covid 19 virus infection


Status: Chronic   





(9) A-fib


Code(s): I48.91 - UNSPECIFIED ATRIAL FIBRILLATION   Status: Chronic   


Qualifiers: 


   Atrial fibrillation type: paroxysmal   Qualified Code(s): I48.0 - Paroxysmal 

atrial fibrillation   





(10) Gastric ulcer


Code(s): K25.9 - GASTRIC ULCER, UNSP AS ACUTE OR CHRONIC, W/O HEMOR OR PERF   

Status: Acute   


Qualifiers: 


   Gastric ulcer chronicity: acute 





- Plan





had brief cardiac arrest with cpr done in ER for electrolyte issues and volume 

depletion on arrival.


had one session of emergent HD done for hyperkalemia and arjun on admission, renal

function is at baseline. 


weaning per pulm advice


she has had multiple hospitalizations/rehab in [facility] in the last month and half, has not ambulated yet in those many days per 

.


severe deconditioning


continue protonix bid, synthroid. Is on vanc and cefepime based on cultures, has

gross purulence on her sacral area with stage 2-3 decub present on admission.


echo in jan 2021 showed ef of 55% with rvsp of 65, she had covid pna and was 

diagnosed in dec 2020,  had it too and has recovered.


hemostable


d/w  at bedside 2/8/21, 2/10/21.

## 2021-02-11 NOTE — PDOC.EP
- Subjective


Date: 02/11/21


Time: 08:00





- Review of Systems


ROS unobtainable: due to endotracheal tube, due to mental status





- Objective


Allergies/Adverse Reactions: 


                                    Allergies











Allergy/AdvReac Type Severity Reaction Status Date / Time


 


Anesthetics - Amide Type - Allergy   Verified 12/02/20 18:59





Select A     


 


Anesthetics - Miriam Type- Allergy   Verified 12/02/20 18:59





Parabens     


 


atorvastatin Allergy   Verified 01/29/21 16:17


 


codeine [Codeine] Allergy   Verified 01/29/21 16:16


 


esomeprazole Allergy   Verified 01/29/21 16:17


 


levofloxacin [From Levaquin] Allergy   Verified 01/29/21 16:16


 


simvastatin Allergy   Verified 01/29/21 16:17











                               Current Medications





Acetaminophen (Acetaminophen 325 Mg Tab)  650 mg PO Q8H PRN


   PRN Reason: Headache/Fever/Mild Pain (1-3)


Fentanyl (Fentanyl Cadd)  100 mls @ 0 mls/hr IV INF SILVIANO; Protocol


   Stop: 03/08/21 17:00


   Last Admin: 02/11/21 07:07 Dose:  100 mls


   Documented by: 


Norepinephrine Bitartrate (Levophed)  250 mls @ 0 mls/hr IVPB INF SILVIANO; Protocol


   Last Admin: 02/09/21 02:28 Dose:  250 mls


   Documented by: 


Vancomycin HCl 1 gm/ Device  200 mls @ 200 mls/hr IVPB 0800,2000 SILVIANO


   Last Admin: 02/11/21 08:21 Dose:  200 mls


   Documented by: 


Cefepime HCl 1 gm/ Sodium (Chloride)  100 mls @ 200 mls/hr IVPB Q12HR SILVIANO


   Last Admin: 02/11/21 08:22 Dose:  100 mls


   Documented by: 


Dexmedetomidine HCl 400 mcg/ (Sodium Chloride)  100 mls @ 0 mls/hr IVPB INF SILVIANO;

Protocol


   Last Admin: 02/11/21 10:06 Dose:  100 mls


   Documented by: 


Levothyroxine Sodium (Levothyroxine Sodium 125 Mcg Tab)  125 mcg PO 0600 SILVIANO


   Last Admin: 02/11/21 06:14 Dose:  125 mcg


   Documented by: 


Lorazepam (Lorazepam 2 Mg/Ml Vial)  2 mg SLOW IVP Q1H PRN


   PRN Reason: Breakthrough agitation


   Stop: 03/08/21 17:00


   Last Admin: 02/08/21 19:02 Dose:  2 mg


   Documented by: 


Miscellaneous Medication (Electrolyte Replacement Protocol)  1 each IVPB ASDIR 

[facility]


Morphine Sulfate (Morphine 2 Mg/Ml Vial)  2 mg SLOW IVP Q1H PRN


   PRN Reason: Breakthrough Pain/Agitation


   Stop: 03/08/21 17:00


Discontinue Previous Narcotic Pain Medications And Benzodiazepines  1 each FS 

.ONE [facility]


   Stop: 03/08/21 17:00


Ondansetron HCl (Ondansetron Pf 4 Mg/2 Ml Vial)  4 mg IVP Q6H PRN


   PRN Reason: Nausea/Vomiting


Pantoprazole Sodium (Pantoprazole 40 Mg Tab)  40 mg PO BID [facility]


   Last Admin: 02/11/21 08:22 Dose:  40 mg


   Documented by: 


Propofol (Propofol 1,000 Mg/100 Ml Vial)  1,000 mg IV INF PRN; Protocol


   PRN Reason: TO ACHIEVE GOAL RASS


   Stop: 03/08/21 17:00


Propofol (Propofol Bolus 1,000 Mg/100 Ml Vial)  20 mg IV Q5MIN PRN


   PRN Reason: BREAKTHROUGH AGITATION


   Stop: 03/08/21 17:00


Sodium Chloride (Flush - Normal Saline 10 Ml Syringe)  10 ml IVF Q12HR [facility]


   Last Admin: 02/11/21 08:22 Dose:  10 ml


   Documented by: 


Sodium Chloride (Flush - Normal Saline 10 Ml Syringe)  10 ml IVF PRN PRN


   PRN Reason: Saline Flush








Vital Signs & Weight: 


                                   Vital Signs











  Temp Pulse Resp Pulse Ox


 


 02/11/21 14:20   89  


 


 02/11/21 14:00    17 


 


 02/11/21 12:00  98.5 F   12 


 


 02/11/21 10:34   93  


 


 02/11/21 10:00    12 


 


 02/11/21 08:00  98.9 F   13 


 


 02/11/21 07:35     100


 


 02/11/21 06:12   93   100








                                        











Admit Weight                   162 lb 0.636 oz


 


Weight                         149 lb 14.629 oz














I/O: 


                                       I/O











 02/10/21 02/11/21 02/12/21





 06:59 06:59 06:59


 


Intake Total 1002.7 1228.3 480


 


Output Total 8512 1850 597


 


Balance -1432.3 -451.7 -117














- Quality Measures


Condition: Atrial Fibrillation/Flutter (hx or current)





- Physical Exam


General: other (I/S)


HEENT: mucus membranes moist, normocephaly


Neck: supple neck, midline trachea, no JVD/HJR


Cardiology: regular rate and rhythm, no murmur, regular rate


Lungs: clear to auscultation, normal breath sounds, no rhonchi, ventilated 

respirations


Abdomen: unremarkable, active bowel sounds, HJR negative





- Labs


Result Diagrams: 


                                 02/12/21 08:15





                                 02/12/21 08:15





- EKG Interpretation


EKG Method: Telemetry


EKG shows: Sinus rhythm





- Assessment/Plan


Assessment/Plan: 





1. Bradycardia arrest requiring CPR, witnessed event


   - with hyperkalemia


2. Recent COVID 19 infection 12/2020


3. Hx Viral cardiomyopathy with recovered LVEF


4. Atrial fibrillation


   - PVAI 2015


   - watchman 2016- no OAC needed for AF other than ASA 81mg daily


5. Dementia








Remains intubated and sedated. telemetry monitor shows SR today. HR stable. no 

emergent need for PPM/ICD support. Will discuss options with her when she is 

extubated.  She did suffer a cardiac arrest that is believed to be bradycardic 

in origin. A simple pacemaker may be sufficient in preventing recurrent episodes

but I will also discuss the utility of an pacemaker with her.

## 2021-02-11 NOTE — PRG
DATE OF SERVICE:  02/11/2021



OBJECTIVE:  VITAL SIGNS:  The patient noted with the following vital signs; pulse of

89, blood pressure 116/55, O2 saturation 100%. 

HEENT:  Unremarkable 

CARDIOVASCULAR SYSTEM:  First and second heart sounds were heard. 

RESPIRATORY SYSTEM:  Clear to auscultation. 

DIGESTIVE SYSTEM:  Revealed a benign abdomen. 

EXTREMITIES:  No peripheral edema.



IMPRESSION:  

1. Cardiopulmonary failure in the context #2.

2. Severe hyperkalemia, which has resolved, status post hemodialysis.

3. Hypokalemia, we are now dealing towards hyperkalemia, status post repletion.



PLAN:  

1. Discontinue potassium supplementation.

2. The patient is already off diuretics.

3. Further management to be dependent on the clinical course.







Job ID:  [id number]

## 2021-02-11 NOTE — PRG
DATE OF SERVICE:  02/11/2021



SUBJECTIVE:  Ms. Garza remains mechanically ventilated.  Her atrial 
fibrillation

rates in the 90s, blood pressure 128/72, respiratory rates in the teens.  She is

sedated this morning when I saw her. 



OBJECTIVE:  LUNGS:  Clear. 

HEART:  Regular rhythm. 

ABDOMEN:  Soft. 

EXTREMITIES:  Without edema.



LABORATORY DATA:  White count 11, hemoglobin 9.5, platelets 369.  Sodium 140,

potassium 5, chloride 95, bicarb 33, BUN 42, creatinine 0.7. 



IMPRESSION:  

1. Respiratory failure associated with cardiac arrest in the ER secondary to

hyperkalemia.  Her dialysis catheter will be removed today. 

2. Rapid atrial fibrillation leading to pulmonary edema and decompensation 
before

she could be extubated. 

3. Extreme deconditioning secondary to recent past COVID infection.



PLAN:  We will continue with supportive care.  We will try switching her to

Precedex, and hopefully, her ability to ventilate with just minimal support will

improve.  We will continue supportive care. 



Critical care time 30 min.



Job ID:  [id number]



MTDD

## 2021-02-12 LAB
ANION GAP SERPL CALC-SCNC: 11 MMOL/L (ref 10–20)
BASOPHILS # BLD AUTO: 0 THOU/UL (ref 0–0.2)
BASOPHILS NFR BLD AUTO: 0.2 % (ref 0–1)
BUN SERPL-MCNC: 32 MG/DL (ref 9.8–20.1)
CALCIUM SERPL-MCNC: 8.2 MG/DL (ref 7.8–10.44)
CHLORIDE SERPL-SCNC: 97 MMOL/L (ref 98–107)
CO2 SERPL-SCNC: 37 MMOL/L (ref 23–31)
CREAT CL PREDICTED SERPL C-G-VRATE: 71 ML/MIN (ref 70–130)
EOSINOPHIL # BLD AUTO: 0.3 THOU/UL (ref 0–0.7)
EOSINOPHIL NFR BLD AUTO: 2.6 % (ref 0–10)
GLUCOSE SERPL-MCNC: 381 MG/DL (ref 83–110)
HGB BLD-MCNC: 9.1 G/DL (ref 12–16)
LYMPHOCYTES # BLD: 1.1 THOU/UL (ref 1.2–3.4)
LYMPHOCYTES NFR BLD AUTO: 9.9 % (ref 21–51)
MCH RBC QN AUTO: 26.9 PG (ref 27–31)
MCV RBC AUTO: 86.5 FL (ref 78–98)
MONOCYTES # BLD AUTO: 0.8 THOU/UL (ref 0.11–0.59)
MONOCYTES NFR BLD AUTO: 6.9 % (ref 0–10)
NEUTROPHILS # BLD AUTO: 8.8 THOU/UL (ref 1.4–6.5)
NEUTROPHILS NFR BLD AUTO: 80.4 % (ref 42–75)
PLATELET # BLD AUTO: 363 THOU/UL (ref 130–400)
POTASSIUM SERPL-SCNC: 4.3 MMOL/L (ref 3.5–5.1)
RBC # BLD AUTO: 3.37 MILL/UL (ref 4.2–5.4)
SODIUM SERPL-SCNC: 141 MMOL/L (ref 136–145)
WBC # BLD AUTO: 10.9 THOU/UL (ref 4.8–10.8)

## 2021-02-12 RX ADMIN — VANCOMYCIN HYDROCHLORIDE SCH MLS: 1 INJECTION, SOLUTION INTRAVENOUS at 09:14

## 2021-02-12 RX ADMIN — Medication SCH ML: at 20:52

## 2021-02-12 RX ADMIN — PANTOPRAZOLE SODIUM SCH MG: 40 GRANULE, DELAYED RELEASE ORAL at 20:51

## 2021-02-12 RX ADMIN — Medication SCH ML: at 09:15

## 2021-02-12 RX ADMIN — VANCOMYCIN HYDROCHLORIDE SCH MLS: 1 INJECTION, SOLUTION INTRAVENOUS at 20:51

## 2021-02-12 NOTE — PRG
DATE OF SERVICE:  02/12/2021



SUBJECTIVE:  Ms. Garza intermittently has central apneas.



OBJECTIVE:  VITAL SIGNS:  Blood pressure 110/57, heart rate 90, respiratory 
rates in

the teens, FiO2 __________ continue with 10 of pressure support, 5 of PEEP. 

LUNGS:  Clear. 

HEART:  Regular rhythm. 

ABDOMEN:  Soft.



LABORATORY DATA:  White count 10.9, hemoglobin 9.1, platelets 363.  Sodium 141,

potassium 4.3, chloride 97, bicarb 37, BUN 32, creatinine 0.7. 



IMPRESSION:  

1. Status post code in the emergency room secondary to hyperkalemia, most likely

secondary to over diuresis, inadequate p.o. intake, and potassium replacement

therapy.  Dialysis catheter has been removed. 

2. Extreme weakness.

3. Rapid atrial fibrillation 2 days back leading to clinical congestive heart

failure. 

4. Extreme deconditioning secondary to recent COVID infection, for which she was
in

rehab. 

We will continue to try to minimize sedation and see if she will pass a 
spontaneous

breathing trial over the weekend, but I am not optimistic that she will and it 
is

highly likely that she will remain ventilated, end up with tracheostomy.  Other

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 
repair,

diabetes, hypertension, reported history of dementia, which likely is 
contributing

to our difficulty with sedation holidays, history of a Watchman device, and 
history

of recent GI bleeding. 



PLAN:  Continue current care with slow decrease in ventilatory support.



Critical care time 30 min.



Job ID:  [id number]



MTDD

## 2021-02-12 NOTE — PDOC.CPN
- Subjective


Date: 02/12/21


Time: 12:18


Interval history: 


Remains sedated and intubated. She is following commands. 





- Review of Systems


General: denies: fever/chills, weight/appetite/sleep changes, night sweats, 

fatigue


Respiratory: denies: cough, congestion, shortness of breath, exercise 

intolerance


Cardiovascular: denies: chest pain, palpitation, edema, paroxysmal nocturnal 

dyspnea, orthopnea


Gastrointestinal: denies: nausea, vomiting, diarrhea, constipation, abd pain, GI

bleeding


Musculoskeletal: denies: pain, tenderness, stiffness, swelling, arthr

itis/arthralgias


Neurological: denies: numbness, syncope, seizure, weakness





- Objective


Allergies/Adverse Reactions: 


                                    Allergies











Allergy/AdvReac Type Severity Reaction Status Date / Time


 


Anesthetics - Amide Type - Allergy   Verified 12/02/20 18:59





Select A     


 


Anesthetics - Miriam Type- Allergy   Verified 12/02/20 18:59





Parabens     


 


atorvastatin Allergy   Verified 01/29/21 16:17


 


codeine [Codeine] Allergy   Verified 01/29/21 16:16


 


esomeprazole Allergy   Verified 01/29/21 16:17


 


levofloxacin [From Levaquin] Allergy   Verified 01/29/21 16:16


 


simvastatin Allergy   Verified 01/29/21 16:17











Visit Medications: 


                               Current Medications





Acetaminophen (Acetaminophen 325 Mg Tab)  650 mg PO Q8H PRN


   PRN Reason: Headache/Fever/Mild Pain (1-3)


Fentanyl (Fentanyl Cadd)  100 mls @ 0 mls/hr IV INF SILVIANO; Protocol


   Stop: 03/08/21 17:00


   Last Admin: 02/11/21 07:07 Dose:  100 mls


   Documented by: 


Norepinephrine Bitartrate (Levophed)  250 mls @ 0 mls/hr IVPB INF SILVIANO; Protocol


   Last Admin: 02/09/21 02:28 Dose:  250 mls


   Documented by: 


Vancomycin HCl 1 gm/ Device  200 mls @ 200 mls/hr IVPB 0800,2000 SILVIANO


   Last Admin: 02/12/21 09:14 Dose:  200 mls


   Documented by: 


Cefepime HCl 1 gm/ Sodium (Chloride)  100 mls @ 200 mls/hr IVPB Q12HR SILVIANO


   Last Admin: 02/12/21 09:14 Dose:  100 mls


   Documented by: 


Dexmedetomidine HCl 400 mcg/ (Sodium Chloride)  100 mls @ 0 mls/hr IVPB INF SILVIANO;

Protocol


   Last Admin: 02/11/21 10:06 Dose:  100 mls


   Documented by: 


Levothyroxine Sodium (Levothyroxine Sodium 125 Mcg Tab)  125 mcg PO 0600 SILVIANO


   Last Admin: 02/12/21 05:22 Dose:  125 mcg


   Documented by: 


Lorazepam (Lorazepam 2 Mg/Ml Vial)  2 mg SLOW IVP Q1H PRN


   PRN Reason: Breakthrough agitation


   Stop: 03/08/21 17:00


   Last Admin: 02/08/21 19:02 Dose:  2 mg


   Documented by: 


Miscellaneous Medication (Electrolyte Replacement Protocol)  1 each IVPB ASDIR 

SILVIANO


Morphine Sulfate (Morphine 2 Mg/Ml Vial)  2 mg SLOW IVP Q1H PRN


   PRN Reason: Breakthrough Pain/Agitation


   Stop: 03/08/21 17:00


Discontinue Previous Narcotic Pain Medications And Benzodiazepines  1 each FS 

.ONE SILVIANO


   Stop: 03/08/21 17:00


Ondansetron HCl (Ondansetron Pf 4 Mg/2 Ml Vial)  4 mg IVP Q6H PRN


   PRN Reason: Nausea/Vomiting


Pantoprazole Sodium (Pantoprazole 40 Mg Granules Packet)  40 mg PER TUBE BID [facility]


Propofol (Propofol 1,000 Mg/100 Ml Vial)  1,000 mg IV INF PRN; Protocol


   PRN Reason: TO ACHIEVE GOAL RASS


   Stop: 03/08/21 17:00


Propofol (Propofol Bolus 1,000 Mg/100 Ml Vial)  20 mg IV Q5MIN PRN


   PRN Reason: BREAKTHROUGH AGITATION


   Stop: 03/08/21 17:00


Sodium Chloride (Flush - Normal Saline 10 Ml Syringe)  10 ml IVF Q12HR SILVIANO


   Last Admin: 02/12/21 09:15 Dose:  10 ml


   Documented by: 


Sodium Chloride (Flush - Normal Saline 10 Ml Syringe)  10 ml IVF PRN PRN


   PRN Reason: Saline Flush


   Last Admin: 02/11/21 20:54 Dose:  10 ml


   Documented by: 








Vital Signs & Weight: 


                                   Vital Signs











  Temp Pulse BP


 


 02/12/21 10:27   90  110/57 L


 


 02/12/21 06:50   91  100/52 L


 


 02/12/21 01:52   90 


 


 02/12/21 01:00  98.4 F  








                                        











Admit Weight                   162 lb 0.636 oz


 


Weight                         149 lb 14.629 oz

















- Physical Exam


General: other (S/I)


HEENT: mucus membranes moist


Neck: supple neck


Cardiac: irregularly regular


Lungs: clear to auscultation


Neuro: no lateralizing findings


Abdomen: active bowel sounds


Extremities: no edema


Skin: clear


Musculoskeletal: normal range of motion





- Labs


Result Diagrams: 


                                 02/12/21 08:15





                                 02/12/21 08:15


                                  Troponin/CKMB











Troponin I  0.685 ng/mL (< 0.028)  H*  02/07/21  03:38    














- Telemetry


Supraventricular conduction: atrial fibrillation





- Assessment/Plan


Assessment/Plan: 





1. Hyperkalemia, resolved.


2. Cardiac arrest secondary to hyperkalemia


3. Hypokalemia, resolved.


4. Paroxysmal afib


5. Recent COVID-19 infection (Dec-2020)


6. 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


7. Anemia, 2a to GI bleed.


8. Presence of a watchman device. 


9. Tachy/Alfredito syndrome. 








PLAN:


- Continue supportive care.


- Agree she will likely need a device as she goes tachycardic in afib and 

bradycardic with medications. 


- Most likely her severe bradycardia was more induced by hyperkalemia. 


- Agree with PPM/AICD per EP 


- CV stable.

## 2021-02-12 NOTE — PDOC.EP
- Subjective


Date: 02/12/21


Time: 14:10


Interval History: 





Continues to be intubated due to poor respiratory efforts. Rhythm is stable. 





- Review of Systems


ROS unobtainable: due to endotracheal tube, due to mental status





- Objective


Allergies/Adverse Reactions: 


                                    Allergies











Allergy/AdvReac Type Severity Reaction Status Date / Time


 


Anesthetics - Amide Type - Allergy   Verified 12/02/20 18:59





Select A     


 


Anesthetics - Miriam Type- Allergy   Verified 12/02/20 18:59





Parabens     


 


atorvastatin Allergy   Verified 01/29/21 16:17


 


codeine [Codeine] Allergy   Verified 01/29/21 16:16


 


esomeprazole Allergy   Verified 01/29/21 16:17


 


levofloxacin [From Levaquin] Allergy   Verified 01/29/21 16:16


 


simvastatin Allergy   Verified 01/29/21 16:17











                               Current Medications





Acetaminophen (Acetaminophen 325 Mg Tab)  650 mg PO Q8H PRN


   PRN Reason: Headache/Fever/Mild Pain (1-3)


Fentanyl (Fentanyl Cadd)  100 mls @ 0 mls/hr IV INF SILVIANO; Protocol


   Stop: 03/08/21 17:00


   Last Admin: 02/11/21 07:07 Dose:  100 mls


   Documented by: 


Norepinephrine Bitartrate (Levophed)  250 mls @ 0 mls/hr IVPB INF SILVIANO; Protocol


   Last Admin: 02/09/21 02:28 Dose:  250 mls


   Documented by: 


Vancomycin HCl 1 gm/ Device  200 mls @ 200 mls/hr IVPB 0800,2000 SILVIANO


   Last Admin: 02/12/21 09:14 Dose:  200 mls


   Documented by: 


Cefepime HCl 1 gm/ Sodium (Chloride)  100 mls @ 200 mls/hr IVPB Q12HR SILVIANO


   Last Admin: 02/12/21 09:14 Dose:  100 mls


   Documented by: 


Dexmedetomidine HCl 400 mcg/ (Sodium Chloride)  100 mls @ 0 mls/hr IVPB INF SILVIANO;

Protocol


   Last Admin: 02/11/21 10:06 Dose:  100 mls


   Documented by: 


Levothyroxine Sodium (Levothyroxine Sodium 125 Mcg Tab)  125 mcg PO 0600 SILVIANO


   Last Admin: 02/12/21 05:22 Dose:  125 mcg


   Documented by: 


Lorazepam (Lorazepam 2 Mg/Ml Vial)  2 mg SLOW IVP Q1H PRN


   PRN Reason: Breakthrough agitation


   Stop: 03/08/21 17:00


   Last Admin: 02/08/21 19:02 Dose:  2 mg


   Documented by: 


Miscellaneous Medication (Electrolyte Replacement Protocol)  1 each IVPB ASDIR 

Critical access hospital


Morphine Sulfate (Morphine 2 Mg/Ml Vial)  2 mg SLOW IVP Q1H PRN


   PRN Reason: Breakthrough Pain/Agitation


   Stop: 03/08/21 17:00


Discontinue Previous Narcotic Pain Medications And Benzodiazepines  1 each FS 

.ONE SILVINAO


   Stop: 03/08/21 17:00


Ondansetron HCl (Ondansetron Pf 4 Mg/2 Ml Vial)  4 mg IVP Q6H PRN


   PRN Reason: Nausea/Vomiting


Pantoprazole Sodium (Pantoprazole 40 Mg Granules Packet)  40 mg PER TUBE BID Critical access hospital


Propofol (Propofol 1,000 Mg/100 Ml Vial)  1,000 mg IV INF PRN; Protocol


   PRN Reason: TO ACHIEVE GOAL RASS


   Stop: 03/08/21 17:00


Propofol (Propofol Bolus 1,000 Mg/100 Ml Vial)  20 mg IV Q5MIN PRN


   PRN Reason: BREAKTHROUGH AGITATION


   Stop: 03/08/21 17:00


Sodium Chloride (Flush - Normal Saline 10 Ml Syringe)  10 ml IVF Q12HR SILVIANO


   Last Admin: 02/12/21 09:15 Dose:  10 ml


   Documented by: 


Sodium Chloride (Flush - Normal Saline 10 Ml Syringe)  10 ml IVF PRN PRN


   PRN Reason: Saline Flush


   Last Admin: 02/11/21 20:54 Dose:  10 ml


   Documented by: 








Vital Signs & Weight: 


                                   Vital Signs











  Pulse BP


 


 02/12/21 10:27  90  110/57 L


 


 02/12/21 06:50  91  100/52 L








                                        











Admit Weight                   162 lb 0.636 oz


 


Weight                         149 lb 14.629 oz














I/O: 


                                       I/O











 02/11/21 02/12/21 02/13/21





 06:59 06:59 06:59


 


Intake Total 1228.3 1665.5 220


 


Output Total 1680 1317 210


 


Balance -451.7 348.5 10














- Quality Measures


Condition: Atrial Fibrillation/Flutter (hx or current)





- Medication Contraindications


No Anticoagulant reason: Anticoagulant not tolerated





- Physical Exam


General: no apparent distress





- Labs


Result Diagrams: 


                                 02/12/21 08:15





                                 02/12/21 08:15





- EKG Interpretation


Status: image reviewed by me


EKG Method: Telemetry


EKG shows: Sinus rhythm





- Assessment/Plan


Assessment/Plan: 





1. Bradycardia arrest requiring CPR, witnessed event


   - with hyperkalemia


2. Recent COVID 19 infection 12/2020


3. Hx Viral cardiomyopathy with recovered LVEF


4. Atrial fibrillation


   - PVAI 2015


   - watchman 2016- no OAC needed for AF other than ASA 81mg daily


5. Dementia








Remains intubated and sedated. telemetry monitor shows SR today. HR stable. no 

emergent need for PPM/ICD support. Will discuss options with her when she is 

extubated.  She did suffer a cardiac arrest that is believed to be bradycardic 

in origin. A simple pacemaker may be sufficient in preventing recurrent episodes

but I will also discuss the utility of an ICD with her. 


2/12/21 - Continues to be stable rhythm.  still intubated due to weak 

respiratory efforts per Pulm.  Potasium stable.  


Would zane benefit from pacing before discharge due to tachy-nathaniel issues and 

nathaniel arrest even though in the setting of hyperkalemia hence possible 

recurrence.

## 2021-02-12 NOTE — PRG
DATE OF SERVICE:  02/12/2021



OBJECTIVE:  The patient is noted with the following vital signs: 

VITAL SIGNS: Blood pressure 105/59, pulse of 120, respiratory rate of 16, O2

saturation on 99%. 

HEENT:  Unremarkable. 

CARDIOVASCULAR SYSTEM:  First and second heart sounds were heard. 

RESPIRATORY SYSTEM:  Revealed ventilator sounds. 

DIGESTIVE SYSTEM:  Revealed a benign abdomen. 

EXTREMITIES: Showed improved peripheral edema.



IMPRESSION:  

1. Hyperkalemia which resulted in cardiac arrest, status post hemodialysis.

2. Hypervolemia, responded well to diuresis.

3. Atrial fibrillation.

4. Failure to thrive.



PLAN:  

1. The patient is already off diuretics.

2. Continue renal supportive measures.

3. Further management will be dependent on the clinical course.







Job ID:  [id number]

## 2021-02-12 NOTE — PDOC.HOSPP
- Subjective


Encounter Date: 02/12/21


Encounter Time: 11:40


Subjective: 


on vent, mild sedation, is awake and follow verbal stimuli





- Objective


Vital Signs & Weight: 


                             Vital Signs (12 hours)











  Pulse BP


 


 02/12/21 14:53  103 H  111/69


 


 02/12/21 10:27  90  110/57 L


 


 02/12/21 06:50  91  100/52 L








                                     Weight











Admit Weight                   162 lb 0.636 oz


 


Weight                         149 lb 14.629 oz











                            Most Recent Monitor Data











Heart Rate from ECG            108


 


NIBP                           119/66


 


NIBP BP-Mean                   83


 


Respiration from ECG           22


 


SpO2                           100














I&O: 


                                        











 02/11/21 02/12/21 02/13/21





 06:59 06:59 06:59


 


Intake Total 1228.3 1665.5 220


 


Output Total 1680 1317 370


 


Balance -451.7 348.5 -150











Result Diagrams: 


                                 02/12/21 08:15





                                 02/12/21 08:15





Hospitalist ROS





- Medication


Medications: 


Active Medications











Generic Name Dose Route Start Last Admin





  Trade Name Freq  PRN Reason Stop Dose Admin


 


Fentanyl  100 mls @ 0 mls/hr  02/06/21 17:00  02/11/21 07:07





  Fentanyl Cadd  IV  03/08/21 17:00  100 mls





  INF SILVIANO   Administration





  Protocol  





  Per Protocol  


 


Norepinephrine Bitartrate  250 mls @ 0 mls/hr  02/07/21 12:15  02/09/21 02:28





  Levophed  IVPB   250 mls





  INF SILVIANO   Administration





  Protocol  





  Titrate  


 


Vancomycin HCl 1 gm/ Device  200 mls @ 200 mls/hr  02/10/21 20:00  02/12/21 09

:14





  IVPB   200 mls





  0800,2000 SILVIANO   Administration


 


Cefepime HCl 1 gm/ Sodium  100 mls @ 200 mls/hr  02/10/21 21:00  02/12/21 09:14





  Chloride  IVPB   100 mls





  Q12HR SILVIANO   Administration


 


Dexmedetomidine HCl 400 mcg/  100 mls @ 0 mls/hr  02/11/21 10:00  02/11/21 10:06





  Sodium Chloride  IVPB   100 mls





  INF SILVIANO   Administration





  Protocol  





  Per Protocol  


 


Levothyroxine Sodium  125 mcg  02/07/21 06:00  02/12/21 05:22





  Levothyroxine Sodium 125 Mcg Tab  PO   125 mcg





  0600 SILVIANO   Administration


 


Lorazepam  2 mg  02/06/21 17:00  02/08/21 19:02





  Lorazepam 2 Mg/Ml Vial  SLOW IVP  03/08/21 17:00  2 mg





  Q1H PRN   Administration





  Breakthrough agitation  


 


Sodium Chloride  10 ml  02/07/21 09:00  02/12/21 09:15





  Flush - Normal Saline 10 Ml Syringe  IVF   10 ml





  Q12HR SILVIANO   Administration


 


Sodium Chloride  10 ml  02/07/21 00:45  02/11/21 20:54





  Flush - Normal Saline 10 Ml Syringe  IVF   10 ml





  PRN PRN   Administration





  Saline Flush  














Hospitalist Exam


Vitals: 


                             Vital Signs (12 hours)











  Pulse BP


 


 02/12/21 14:53  103 H  111/69


 


 02/12/21 10:27  90  110/57 L


 


 02/12/21 06:50  91  100/52 L








                                     Weight











Admit Weight                   162 lb 0.636 oz


 


Weight                         149 lb 14.629 oz











                            Most Recent Monitor Data











Heart Rate from ECG            108


 


NIBP                           119/66


 


NIBP BP-Mean                   83


 


Respiration from ECG           22


 


SpO2                           100














Eye: PERRL, anicteric sclera


ENT: no oropharyngeal lesions, moist mucosa


Neck: supple, no JVD


Heart: RRR, no murmur


Respiratory: no wheezes, no rales


Gastrointestinal: soft, non-tender, non-distended, normal bowel sounds


Extremities: no cyanosis, no edema


Neurological: cranial nerve grossly intact, no focal deficits





Hosp A/P


(1) Acute respiratory failure with hypoxia


Code(s): J96.01 - ACUTE RESPIRATORY FAILURE WITH HYPOXIA   Status: Acute   





(2) ARJUN (acute kidney injury)


Code(s): N17.9 - ACUTE KIDNEY FAILURE, UNSPECIFIED   Status: Resolved   





(3) Hyperkalemia


Code(s): E87.5 - HYPERKALEMIA   Status: Resolved   





(4) CAD (coronary artery disease)


Code(s): I25.10 - ATHSCL HEART DISEASE OF NATIVE CORONARY ARTERY W/O ANG PCTRS  

Status: Chronic   


Qualifiers: 


   Coronary Disease-Associated Artery/Lesion type: bypass graft   Native vs. 

transplanted heart: native heart   Associated angina: without angina   Qualified

Code(s): I25.810 - Atherosclerosis of coronary artery bypass graft(s) without 

angina pectoris   





(5) DM type 2 (diabetes mellitus, type 2)


Status: Chronic   


Qualifiers: 


   Diabetes mellitus long term insulin use: with long term use 





(6) HTN (hypertension)


Code(s): I10 - ESSENTIAL (PRIMARY) HYPERTENSION   Status: Chronic   


Qualifiers: 


   Hypertension type: essential hypertension   Qualified Code(s): I10 - 

Essential (primary) hypertension   





(7) Dyslipidemia


Code(s): E78.5 - HYPERLIPIDEMIA, UNSPECIFIED   Status: Chronic   





(8) h/o covid 19 virus infection


Status: Chronic   





(9) A-fib


Code(s): I48.91 - UNSPECIFIED ATRIAL FIBRILLATION   Status: Chronic   


Qualifiers: 


   Atrial fibrillation type: paroxysmal   Qualified Code(s): I48.0 - Paroxysmal 

atrial fibrillation   





(10) Gastric ulcer


Code(s): K25.9 - GASTRIC ULCER, UNSP AS ACUTE OR CHRONIC, W/O HEMOR OR PERF   

Status: Acute   


Qualifiers: 


   Gastric ulcer chronicity: acute 





- Plan





had brief cardiac arrest with cpr done in ER for electrolyte issues and volume 

depletion on arrival.


had one session of emergent HD done for hyperkalemia and arjun on admission, renal

function is at baseline. 


weaning per pulm advice


she has had multiple hospitalizations/rehab in [facility] in the last month and half, has not ambulated yet in those many days per 

.


severe deconditioning


continue protonix bid, synthroid. Is on vanc and cefepime based on cultures, has

gross purulence on her sacral area with stage 2-3 decub present on admission.


echo in jan 2021 showed ef of 55% with rvsp of 65, she had covid pna and was 

diagnosed in dec 2020,  had it too and has recovered.


hemostable


d/w  at bedside 2/8/21, 2/10/21, on phone on 2/12/21.

## 2021-02-13 LAB
ANION GAP SERPL CALC-SCNC: 15 MMOL/L (ref 10–20)
BASOPHILS # BLD AUTO: 0 THOU/UL (ref 0–0.2)
BASOPHILS NFR BLD AUTO: 0.3 % (ref 0–1)
BUN SERPL-MCNC: 27 MG/DL (ref 9.8–20.1)
CALCIUM SERPL-MCNC: 8.4 MG/DL (ref 7.8–10.44)
CHLORIDE SERPL-SCNC: 100 MMOL/L (ref 98–107)
CO2 SERPL-SCNC: 31 MMOL/L (ref 23–31)
CREAT CL PREDICTED SERPL C-G-VRATE: 75 ML/MIN (ref 70–130)
EOSINOPHIL # BLD AUTO: 0.1 THOU/UL (ref 0–0.7)
EOSINOPHIL NFR BLD AUTO: 0.5 % (ref 0–10)
GLUCOSE SERPL-MCNC: 408 MG/DL (ref 83–110)
HGB BLD-MCNC: 8.9 G/DL (ref 12–16)
LYMPHOCYTES # BLD: 1 THOU/UL (ref 1.2–3.4)
LYMPHOCYTES NFR BLD AUTO: 9.5 % (ref 21–51)
MCH RBC QN AUTO: 27.1 PG (ref 27–31)
MCV RBC AUTO: 86.7 FL (ref 78–98)
MONOCYTES # BLD AUTO: 0.9 THOU/UL (ref 0.11–0.59)
MONOCYTES NFR BLD AUTO: 8.4 % (ref 0–10)
NEUTROPHILS # BLD AUTO: 8.2 THOU/UL (ref 1.4–6.5)
NEUTROPHILS NFR BLD AUTO: 81.3 % (ref 42–75)
PLATELET # BLD AUTO: 372 THOU/UL (ref 130–400)
POTASSIUM SERPL-SCNC: 4.1 MMOL/L (ref 3.5–5.1)
RBC # BLD AUTO: 3.29 MILL/UL (ref 4.2–5.4)
SODIUM SERPL-SCNC: 142 MMOL/L (ref 136–145)
WBC # BLD AUTO: 10.1 THOU/UL (ref 4.8–10.8)

## 2021-02-13 RX ADMIN — PANTOPRAZOLE SODIUM SCH MG: 40 GRANULE, DELAYED RELEASE ORAL at 20:45

## 2021-02-13 RX ADMIN — PANTOPRAZOLE SODIUM SCH MG: 40 GRANULE, DELAYED RELEASE ORAL at 08:28

## 2021-02-13 RX ADMIN — Medication SCH ML: at 20:45

## 2021-02-13 RX ADMIN — INSULIN HUMAN PRN UNIT: 100 INJECTION, SOLUTION PARENTERAL at 10:53

## 2021-02-13 RX ADMIN — Medication SCH ML: at 08:28

## 2021-02-13 RX ADMIN — VANCOMYCIN HYDROCHLORIDE SCH MLS: 1 INJECTION, SOLUTION INTRAVENOUS at 20:43

## 2021-02-13 RX ADMIN — VANCOMYCIN HYDROCHLORIDE SCH MLS: 1 INJECTION, SOLUTION INTRAVENOUS at 08:26

## 2021-02-13 RX ADMIN — INSULIN HUMAN PRN UNIT: 100 INJECTION, SOLUTION PARENTERAL at 16:23

## 2021-02-13 NOTE — PDOC.HOSPP
- Subjective


Encounter Date: 02/13/21


Encounter Time: 11:45


non-verbal


Subjective: 


Patient seen and examined for respiratory failure.  Remains on mechanical 

ventilation.  Tolerating tube feeds.





- Objective


Vital Signs & Weight: 


                             Vital Signs (12 hours)











  Temp Pulse Resp


 


 02/13/21 16:28   98 


 


 02/13/21 16:00  97.7 F   14


 


 02/13/21 14:00    13


 


 02/13/21 12:00  97.5 F L   12


 


 02/13/21 10:48   87 


 


 02/13/21 10:00    14








                                     Weight











Admit Weight                   162 lb 0.636 oz


 


Weight                         149 lb 14.629 oz











                            Most Recent Monitor Data











Heart Rate from ECG            105


 


NIBP                           139/85


 


NIBP BP-Mean                   103


 


Respiration from ECG           21


 


SpO2                           100














I&O: 


                                        











 02/12/21 02/13/21 02/14/21





 06:59 06:59 06:59


 


Intake Total 1665.5 640 1765


 


Output Total 4253 104 0690


 


Balance 348.5 -170 154











Result Diagrams: 


                                 02/13/21 03:25





                                 02/13/21 03:25


Additional Labs: 


                                   Accuchecks











  02/13/21 02/13/21 02/13/21





  16:22 14:09 10:05


 


POC Glucose  250 H  309 H  369 H








                                        


Abnormal Lab Results - Last 48 hrs





02/12/21 08:15: Chloride 97 L, Carbon Dioxide 37 H, BUN 32 H


02/12/21 08:15: B-Natriuretic Peptide 151.3 H


02/12/21 08:15: WBC 10.9 H, RBC 3.37 L, Hgb 9.1 L, Hct 29.1 L, MCH 26.9 L, MCHC 

31.1 L, RDW 16.0 H, Neutrophils % 80.4 H, Lymphocytes % 9.9 L, Neutrophils # 8.8

H, Lymphocytes # 1.1 L, Monocytes # 0.8 H


02/13/21 03:25: BUN 27 H


02/13/21 03:25: RBC 3.29 L, Hgb 8.9 L, Hct 28.5 L, MCHC 31.3 L, RDW 16.0 H, 

Neutrophils % 81.3 H, Lymphocytes % 9.5 L, Neutrophils # 8.2 H, Lymphocytes # 

1.0 L, Monocytes # 0.9 H





Microbiology - Entire Visit





02/06/21 14:16   Venous blood - Right Arm   Blood Culture - Final


                            NO GROWTH IN 5 DAYS


02/06/21 14:16   Venous blood - Right Arm   Blood Culture - Final


                            NO GROWTH IN 5 DAYS


02/06/21 22:55   Sacral - Pending   Bacterial Culture - Final


                            Methicillin resistant S.aureus


                            Enterococcus faecalis


02/06/21 16:33   Urine tapia catheter   Urine Culture - Final





                                        





Vent - Assess Status                                       Start:  02/06/21 

23:35


Freq:   Q2HR                                               Status: Complete     




Protocol:                                                                       




 Document     02/13/21 16:00  [facility]  (Rec: 02/13/21 18:36  [facility]  [id number])


 Ventilator Status/Info


     Patient/Vent Settings


      Endotracheal Tube Insertion Site           Oral Endotracheal


      ETT Position (cm)                          24


      Trach Cuff Inflated                        Yes


      Ventilator Mode                            CPAP/Spont


      FIO2                                       60


      Vent TV Set                                500


      Delivered TV                               


      Patient TV (Spontaneous)                   540


      Vent RR                                    


      Patient RR (12-20)                         14


      Peak Pressure (cmH2O)                      15


      PEEP (cm H2O)                              5


      PSV                                        10


     Sedation (RASS)


      Aguirre Agitation-Sedation Scale (RASS):


      +4 = Combative: Combative, violent, immediate danger to staff


      +3 = Very agitated: Pulls to remove tubes or catheter; aggressive


      +2 = Agitated: Frequent non-purposeful movement, fights ventilator


      +1 = Restless: Anxious, apprehensive, movements not aggressive


      0 = Alert and Calm: Spontaneously pays attention to caregiver


      -1 = Drowsy: Not fully alert, but has sustained awakening to voice


      -2 = Light sedation: Briefly awakens to voice (eyes contact < 10 secs)


      -3 = Moderate sedation: Movement or eye opening to voice (no eye contact)


      -4 = Deep sedation: No response to voice, but movement or eye opening to


      physical stimulation


      -5 = Unarousable: No response to voice or physical stimulation


       Sedation                                  No


       Sedation Type                             


       RASS Goal Score                           0


       RASS Actual Score                         [ 0 ] Alert and calm


       Intervention to attain goal               


     Comfort Care


       Call Silva Within Reach                    


       Oral Care                                 


       HOB Angle (degrees)                       30


       Patient Turned                            Right


       *If no, document why not                  








EKG Reviewed by me: Yes (Sinus rhythm on telemetry)





Hospitalist ROS





- Review of Systems


ROS unobtainable: due to mental status





- Medication


Medications: 


Active Medications











Generic Name Dose Route Start Last Admin





  Trade Name Freq  PRN Reason Stop Dose Admin


 


Vancomycin HCl 1 gm/ Device  200 mls @ 200 mls/hr  02/10/21 20:00  02/13/21 

08:26





  IVPB   200 mls





  0800,2000 SILVIANO   Administration


 


Cefepime HCl 1 gm/ Sodium  100 mls @ 200 mls/hr  02/10/21 21:00  02/13/21 09:42





  Chloride  IVPB   100 mls





  Q12HR SILVIANO   Administration


 


Dexmedetomidine HCl 400 mcg/  100 mls @ 0 mls/hr  02/11/21 10:00  02/11/21 10:06





  Sodium Chloride  IVPB   100 mls





  INF SILVIANO   Administration





  Protocol  





  Per Protocol  


 


Insulin Human Regular  0 units  02/13/21 10:28  02/13/21 16:23





  Insulin Regular 300 Units/3 Ml Vial  SC   4 unit





  .MODERATE SLIDING SC PRN   Administration





  Moderate Correctional Scale  


 


Levothyroxine Sodium  125 mcg  02/07/21 06:00  02/13/21 06:16





  Levothyroxine Sodium 125 Mcg Tab  PO   125 mcg





  0600 SILVIANO   Administration


 


Pantoprazole Sodium  40 mg  02/12/21 21:00  02/13/21 08:28





  Pantoprazole 40 Mg Granules Packet  PER TUBE   40 mg





  BID SILVIANO   Administration


 


Sodium Chloride  10 ml  02/07/21 09:00  02/13/21 08:28





  Flush - Normal Saline 10 Ml Syringe  IVF   10 ml





  Q12HR SILVIANO   Administration


 


Sodium Chloride  10 ml  02/07/21 00:45  02/11/21 20:54





  Flush - Normal Saline 10 Ml Syringe  IVF   10 ml





  PRN PRN   Administration





  Saline Flush  














Hospitalist Exam


Vitals: 


                             Vital Signs (12 hours)











  Temp Pulse Resp


 


 02/13/21 16:28   98 


 


 02/13/21 16:00  97.7 F   14


 


 02/13/21 14:00    13


 


 02/13/21 12:00  97.5 F L   12


 


 02/13/21 10:48   87 


 


 02/13/21 10:00    14








                                     Weight











Admit Weight                   162 lb 0.636 oz


 


Weight                         149 lb 14.629 oz











                            Most Recent Monitor Data











Heart Rate from ECG            105


 


NIBP                           139/85


 


NIBP BP-Mean                   103


 


Respiration from ECG           21


 


SpO2                           100














General Appearance: ill appearing


General - other findings: On mechanical ventilation


Neck: supple, no JVD


Heart: RRR, no gallops


Respiratory: no wheezes, no rales, rhonchi


Gastrointestinal: soft, non-distended


Extremities: no cyanosis


Neurological - other findings: Neuro/psychunable to assess due to current ment

ation





Hosp A/P


(1) Cardiac arrest


Code(s): I46.9 - CARDIAC ARREST, CAUSE UNSPECIFIED   Status: Acute   





(2) Acute respiratory failure with hypoxia


Code(s): J96.01 - ACUTE RESPIRATORY FAILURE WITH HYPOXIA   Status: Acute   





(3) CAD (coronary artery disease)


Code(s): I25.10 - ATHSCL HEART DISEASE OF NATIVE CORONARY ARTERY W/O ANG PCTRS  

Status: Chronic   


Qualifiers: 


   Coronary Disease-Associated Artery/Lesion type: bypass graft   Native vs. 

transplanted heart: native heart   Associated angina: without angina   Qualified

Code(s): I25.810 - Atherosclerosis of coronary artery bypass graft(s) without 

angina pectoris   





(4) DM type 2 (diabetes mellitus, type 2)


Status: Chronic   


Qualifiers: 


   Diabetes mellitus long term insulin use: with long term use 





(5) HTN (hypertension)


Code(s): I10 - ESSENTIAL (PRIMARY) HYPERTENSION   Status: Chronic   


Qualifiers: 


   Hypertension type: essential hypertension   Qualified Code(s): I10 - 

Essential (primary) hypertension   





(6) Hyperkalemia


Code(s): E87.5 - HYPERKALEMIA   





(7) ARJUN (acute kidney injury)


Code(s): N17.9 - ACUTE KIDNEY FAILURE, UNSPECIFIED   





- Plan


DVT proph w/SCDs





Continue mechanical ventilation.  Ventilator setting per critical care.  Pota

ssium normalized.  Cardiac arrest was probably due to hyperkalemia.  Patient has

a watchman device.  Patient will probably require pacing device prior to 

discharge.  Started on Lovenox for DVT prophylaxis

## 2021-02-13 NOTE — RAD
SINGLE VIEW CHEST:

 

Date: 02/13/2021

 

COMPARISON:  

02/10/2021. 

 

HISTORY:  

Ventilated patient with respiratory failure. 

 

FINDINGS:

Single view of the chest shows an enlarged cardiomediastinal silhouette. The patient is status post s
ternotomy. The endotracheal tube and NG tube are unchanged in position. Bilateral pulmonary vascular 
enlargement is seen. No consolidation or pleural effusion seen. 

 

IMPRESSION: 

Stable exam. 

 

 

POS: EAA

## 2021-02-13 NOTE — PRG
DATE OF SERVICE:  02/13/2021



SUBJECTIVE:  The patient is noted with the following vital signs.



OBJECTIVE:  VITAL SIGNS:  Blood pressure 119/52 with a pulse of 98, FiO2 of 40, O2

saturation of 99%. 

HEENT:  Remarkable for endotracheal tube in place. 

CARDIOVASCULAR SYSTEM:  First and second heart sounds were heard. 

RESPIRATORY SYSTEM:  Revealed vented sounds. 

DIGESTIVE SYSTEM:  Revealed a benign abdomen. 

EXTREMITIES:  Showed improved peripheral edema.



LABORATORY INVESTIGATION:  Showed a hemoglobin of 8.9.  Chemistry pretty much

unremarkable except blood sugar of __________. 



IMPRESSION:  

1. Hyperkalemia, which has resolved status post hemodialysis.

2. Cardiac arrest in the context of hyperkalemia, status post hemodialysis x1

session. 

3. Hypervolemia, responded well to medical diuresis.



PLAN:  Continue current renal supportive measures.







Job ID:  [id number]

## 2021-02-13 NOTE — PRG
DATE OF SERVICE:  02/13/2021



SUBJECTIVE:  Ms. Garza is a 75-year-old woman, who was admitted on 02/06/2021

following acute cardiac arrest requiring CPR. 



The cardiac event was secondary to acute hyperkalemia, which required emergent

hemodialysis. 



She is currently on mechanical ventilator support, sedated with fentanyl at 25

mcg/hour. 



She awakens to voice.  She is overall deconditioned. 



Urinary output is adequate for the patient's age and weight.   



She is tolerating tube feeds at goal and having bowel movements.



OBJECTIVE:  VITAL SIGNS:  This morning on my evaluation include blood pressure

130/78, pulse 87 to 105.  Respiratory rate is 16, maximum temperature in last 24

hours 98.7 degrees Fahrenheit, oxygen saturation 99% on FiO2 of 40% on mechanical

ventilator support. 

HEENT:  Pupils are equal, round, and reactive to light bilaterally. 

HEART:  Reveals irregular rate and irregular rhythm. 

LUNGS:  Clear to auscultation bilaterally.  Breathing, regular and unlabored. 

ABDOMEN:  Soft, nontender, and nondistended. 

NEUROLOGIC:  Reveals no focal deficits present.



LABORATORY FINDINGS:  Today includes a CBC with 10,100 white blood cells, hemoglobin

and hematocrit stable at 8.9 and 28.5 respectively. 



Platelet count 372,000. 



Metabolic profile; sodium 142, potassium 4.1, chloride is 100, bicarb is 31, BUN 27,

creatinine 0.71, glucose is 408.  I personally reviewed the chest x-ray obtained

this morning which is notable for mild cardiomegaly.  No pleural effusions or

pneumothoraces are present. 



IMPRESSIONS:  

1. Acute respiratory failure, status post cardiac arrest, improving.

2. Resolved acute hyperkalemia.

3. Acute blood loss anemia, stable.



PLAN:  

1. Continue mechanical ventilator support.  The patient will be weaned and extubated

as indicated. 

2. Initiate physical and occupational therapy.

3. We will start chemical VTE prophylaxis. 



Total critical care time is 40 minutes.







Job ID:  [id number]

## 2021-02-13 NOTE — PDOC.CPN
- Subjective


Date: 02/13/21


Time: 12:21





- Objective


Allergies/Adverse Reactions: 


                                    Allergies











Allergy/AdvReac Type Severity Reaction Status Date / Time


 


Anesthetics - Amide Type - Allergy   Verified 12/02/20 18:59





Select A     


 


Anesthetics - Miriam Type- Allergy   Verified 12/02/20 18:59





Parabens     


 


atorvastatin Allergy   Verified 01/29/21 16:17


 


codeine [Codeine] Allergy   Verified 01/29/21 16:16


 


esomeprazole Allergy   Verified 01/29/21 16:17


 


levofloxacin [From Levaquin] Allergy   Verified 01/29/21 16:16


 


simvastatin Allergy   Verified 01/29/21 16:17











Visit Medications: 


                               Current Medications





Acetaminophen (Acetaminophen 325 Mg Tab)  650 mg PO Q8H PRN


   PRN Reason: Headache/Fever/Mild Pain (1-3)


Dextrose/Water (Dextrose 50% Abboject 50 Ml Syringe)  25 gm SLOW IVP PRN PRN


   PRN Reason: Hypoglycemia


Glucagon (Glucagon 1 Mg/Ml Vial)  1 mg IM PRN PRN


   PRN Reason: Hypoglycemia


Fentanyl (Fentanyl Cadd)  100 mls @ 0 mls/hr IV INF SILVIANO; Protocol


   Stop: 03/08/21 17:00


   Last Admin: 02/11/21 07:07 Dose:  100 mls


   Documented by: 


Norepinephrine Bitartrate (Levophed)  250 mls @ 0 mls/hr IVPB INF SILVIANO; Protocol


   Last Admin: 02/09/21 02:28 Dose:  250 mls


   Documented by: 


Vancomycin HCl 1 gm/ Device  200 mls @ 200 mls/hr IVPB 0800,2000 SIL[facility]ANO


   Last Admin: 02/13/21 08:26 Dose:  200 mls


   Documented by: 


Cefepime HCl 1 gm/ Sodium (Chloride)  100 mls @ 200 mls/hr IVPB Q12HR SIL[facility]O


   Last Admin: 02/13/21 09:42 Dose:  100 mls


   Documented by: 


Dexmedetomidine HCl 400 mcg/ (Sodium Chloride)  100 mls @ 0 mls/hr IVPB INF SILVIANO;

Protocol


   Last Admin: 02/11/21 10:06 Dose:  100 mls


   Documented by: 


Dextrose/Water (D5w)  1,000 mls @ 0 mls/hr IV .Q0M PRN


   PRN Reason: Hypoglycemia


Insulin Glargine 10 units/ (Miscellaneous Medication)  0.1 mls @ 0 mls/hr SC NOW

SILVIANO


   Stop: 02/13/21 14:00


   Last Admin: 02/13/21 10:53 Dose:  0.1 mls


   Documented by: 


Insulin Glargine 10 units/ (Miscellaneous Medication)  0.1 mls @ 0 mls/hr SC QAM

[facility]


Insulin Human Regular (Insulin Regular 300 Units/3 Ml Vial)  0 units SC 

.MODERATE SLIDING SC PRN


   PRN Reason: Moderate Correctional Scale


   Last Admin: 02/13/21 10:53 Dose:  10 unit


   Documented by: 


Levothyroxine Sodium (Levothyroxine Sodium 125 Mcg Tab)  125 mcg PO 0600 [facility]


   Last Admin: 02/13/21 06:16 Dose:  125 mcg


   Documented by: 


Lorazepam (Lorazepam 2 Mg/Ml Vial)  2 mg SLOW IVP Q1H PRN


   PRN Reason: Breakthrough agitation


   Stop: 03/08/21 17:00


   Last Admin: 02/08/21 19:02 Dose:  2 mg


   Documented by: 


Miscellaneous Medication (Electrolyte Replacement Protocol)  1 each IVPB ASDIR 

[facility]


Morphine Sulfate (Morphine 2 Mg/Ml Vial)  2 mg SLOW IVP Q1H PRN


   PRN Reason: Breakthrough Pain/Agitation


   Stop: 03/08/21 17:00


Discontinue Previous Narcotic Pain Medications And Benzodiazepines  1 each FS 

.ONE [facility]


   Stop: 03/08/21 17:00


Ondansetron HCl (Ondansetron Pf 4 Mg/2 Ml Vial)  4 mg IVP Q6H PRN


   PRN Reason: Nausea/Vomiting


Pantoprazole Sodium (Pantoprazole 40 Mg Granules Packet)  40 mg PER TUBE BID [facility]


   Last Admin: 02/13/21 08:28 Dose:  40 mg


   Documented by: 


Propofol (Propofol 1,000 Mg/100 Ml Vial)  1,000 mg IV INF PRN; Protocol


   PRN Reason: TO ACHIEVE GOAL RASS


   Stop: 03/08/21 17:00


Propofol (Propofol Bolus 1,000 Mg/100 Ml Vial)  20 mg IV Q5MIN PRN


   PRN Reason: BREAKTHROUGH AGITATION


   Stop: 03/08/21 17:00


Sodium Chloride (Flush - Normal Saline 10 Ml Syringe)  10 ml IVF Q12HR [facility]


   Last Admin: 02/13/21 08:28 Dose:  10 ml


   Documented by: 


Sodium Chloride (Flush - Normal Saline 10 Ml Syringe)  10 ml IVF PRN PRN


   PRN Reason: Saline Flush


   Last Admin: 02/11/21 20:54 Dose:  10 ml


   Documented by: 








Vital Signs & Weight: 


                                   Vital Signs











  Temp Pulse Resp Pulse Ox


 


 02/13/21 10:48   87  


 


 02/13/21 10:00    14 


 


 02/13/21 08:00  97.5 F L   


 


 02/13/21 06:45   81   100


 


 02/13/21 01:34   78  


 


 02/13/21 01:00  98.7 F   








                                        











Admit Weight                   162 lb 0.636 oz


 


Weight                         149 lb 14.629 oz

















- Medication Contraindications


No Anticoagulant reason: Anticoagulant not tolerated





- Physical Exam


General: other (on vent.)


Neck: no JVD/HJR


Cardiac: regular rhythm


Lungs: wheezes, bibasilar rales


Abdomen: unremarkable


Extremities: no cyanosis, no edema





- Labs


Result Diagrams: 


                                 02/13/21 03:25





                                 02/13/21 03:25


                                  Troponin/CKMB











Troponin I  0.685 ng/mL (< 0.028)  H*  02/07/21  03:38    














- Telemetry


Sinus rhythms and dysrhythmias: sinus rhythm





- Assessment/Plan


Assessment/Plan: 





1. Hyperkalemia, resolved.


2. Cardiac arrest secondary to hyperkalemia


3. Hypokalemia, resolved.


4. Paroxysmal afib. remains in sinus.


5. Recent COVID-19 infection (Dec-2020). remains ventilated.


6. 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


7. Anemia, 2a to GI bleed.


8. Presence of a watchman device. 


9. Tachy/Alfredito syndrome. 








PLAN:


- Continue supportive care.


- Agree she will likely need a device as she goes tachycardic in afib and 

bradycardic with medications. 


- Most likely her severe bradycardia was more induced by hyperkalemia. 


- Agree with PPM/AICD per EP 


- CV stable.

## 2021-02-14 LAB
ALBUMIN SERPL BCG-MCNC: 2.7 G/DL (ref 3.4–4.8)
ALP SERPL-CCNC: 528 U/L (ref 40–110)
ALT SERPL W P-5'-P-CCNC: 23 U/L (ref 8–55)
ANION GAP SERPL CALC-SCNC: 14 MMOL/L (ref 10–20)
AST SERPL-CCNC: 33 U/L (ref 5–34)
BILIRUB SERPL-MCNC: 0.4 MG/DL (ref 0.2–1.2)
BUN SERPL-MCNC: 24 MG/DL (ref 9.8–20.1)
CALCIUM SERPL-MCNC: 9.1 MG/DL (ref 7.8–10.44)
CHLORIDE SERPL-SCNC: 101 MMOL/L (ref 98–107)
CO2 SERPL-SCNC: 32 MMOL/L (ref 23–31)
CREAT CL PREDICTED SERPL C-G-VRATE: 87 ML/MIN (ref 70–130)
GLOBULIN SER CALC-MCNC: 3.1 G/DL (ref 2.4–3.5)
GLUCOSE SERPL-MCNC: 263 MG/DL (ref 83–110)
MAGNESIUM SERPL-MCNC: 1.8 MG/DL (ref 1.6–2.6)
POTASSIUM SERPL-SCNC: 3.3 MMOL/L (ref 3.5–5.1)
SODIUM SERPL-SCNC: 144 MMOL/L (ref 136–145)

## 2021-02-14 RX ADMIN — PANTOPRAZOLE SODIUM SCH: 40 GRANULE, DELAYED RELEASE ORAL at 08:34

## 2021-02-14 RX ADMIN — INSULIN HUMAN PRN UNIT: 100 INJECTION, SOLUTION PARENTERAL at 16:31

## 2021-02-14 RX ADMIN — INSULIN HUMAN PRN UNIT: 100 INJECTION, SOLUTION PARENTERAL at 05:46

## 2021-02-14 RX ADMIN — AMIODARONE HYDROCHLORIDE SCH MLS: 50 INJECTION, SOLUTION INTRAVENOUS at 00:32

## 2021-02-14 RX ADMIN — INSULIN HUMAN PRN UNIT: 100 INJECTION, SOLUTION PARENTERAL at 00:12

## 2021-02-14 RX ADMIN — VANCOMYCIN HYDROCHLORIDE SCH MLS: 1 INJECTION, SOLUTION INTRAVENOUS at 20:57

## 2021-02-14 RX ADMIN — PANTOPRAZOLE SODIUM SCH MG: 40 GRANULE, DELAYED RELEASE ORAL at 20:57

## 2021-02-14 RX ADMIN — AMIODARONE HYDROCHLORIDE SCH MLS: 50 INJECTION, SOLUTION INTRAVENOUS at 09:31

## 2021-02-14 RX ADMIN — Medication SCH ML: at 08:34

## 2021-02-14 RX ADMIN — POTASSIUM CHLORIDE, DEXTROSE MONOHYDRATE AND SODIUM CHLORIDE SCH MLS: 150; 5; 450 INJECTION, SOLUTION INTRAVENOUS at 18:08

## 2021-02-14 RX ADMIN — INSULIN GLARGINE SCH MLS: 100 INJECTION, SOLUTION SUBCUTANEOUS at 10:51

## 2021-02-14 RX ADMIN — VANCOMYCIN HYDROCHLORIDE SCH MLS: 1 INJECTION, SOLUTION INTRAVENOUS at 08:32

## 2021-02-14 NOTE — PDOC.CPN
- Subjective


Date: 02/14/21


Time: 10:57





- Review of Systems


ROS unobtainable: due to mental status





- Objective


Allergies/Adverse Reactions: 


                                    Allergies











Allergy/AdvReac Type Severity Reaction Status Date / Time


 


Anesthetics - Amide Type - Allergy   Verified 12/02/20 18:59





Select A     


 


Anesthetics - Miriam Type- Allergy   Verified 12/02/20 18:59





Parabens     


 


atorvastatin Allergy   Verified 01/29/21 16:17


 


codeine [Codeine] Allergy   Verified 01/29/21 16:16


 


esomeprazole Allergy   Verified 01/29/21 16:17


 


levofloxacin [From Levaquin] Allergy   Verified 01/29/21 16:16


 


simvastatin Allergy   Verified 01/29/21 16:17











Visit Medications: 


                               Current Medications





Acetaminophen (Acetaminophen 325 Mg Tab)  650 mg PO Q8H PRN


   PRN Reason: Headache/Fever/Mild Pain (1-3)


Dextrose/Water (Dextrose 50% Abboject 50 Ml Syringe)  25 gm SLOW IVP PRN PRN


   PRN Reason: Hypoglycemia


Enoxaparin Sodium (Enoxaparin Sodium 40 Mg/0.4 Ml Syringe)  40 mg SC 2100 [facility]


   Last Admin: 02/13/21 20:42 Dose:  40 mg


   Documented by: 


Glucagon (Glucagon 1 Mg/Ml Vial)  1 mg IM PRN PRN


   PRN Reason: Hypoglycemia


Vancomycin HCl 1 gm/ Device  200 mls @ 200 mls/hr IVPB 0800,2000 [facility]


   Last Admin: 02/14/21 08:32 Dose:  200 mls


   Documented by: 


Cefepime HCl 1 gm/ Sodium (Chloride)  100 mls @ 200 mls/hr IVPB Q12HR [facility]


   Last Admin: 02/14/21 09:32 Dose:  100 mls


   Documented by: 


Dexmedetomidine HCl 400 mcg/ (Sodium Chloride)  100 mls @ 0 mls/hr IVPB INF [facility];

Protocol


   Last Admin: 02/11/21 10:06 Dose:  100 mls


   Documented by: 


Dextrose/Water (D5w)  1,000 mls @ 0 mls/hr IV .Q0M PRN


   PRN Reason: Hypoglycemia


Insulin Glargine 10 units/ (Miscellaneous Medication)  0.1 mls @ 0 mls/hr SC QAM

[facility]


   Last Admin: 02/14/21 10:51 Dose:  0.1 mls


   Documented by: 


Amiodarone HCl 450 mg/Miscellaneous Medication 1 each/ Dextrose/Water  259 mls @

0 mls/hr IVPB INF SILVIANO; Protocol


   Last Admin: 02/14/21 09:31 Dose:  259 mls


   Documented by: 


Insulin Human Regular (Insulin Regular 300 Units/3 Ml Vial)  0 units SC 

.MODERATE SLIDING SC PRN


   PRN Reason: Moderate Correctional Scale


   Last Admin: 02/14/21 05:46 Dose:  2 unit


   Documented by: 


Levothyroxine Sodium (Levothyroxine Sodium 125 Mcg Tab)  125 mcg PO 0600 [facility]


   Last Admin: 02/14/21 05:51 Dose:  125 mcg


   Documented by: 


Miscellaneous Medication (Electrolyte Replacement Protocol)  1 each IVPB ASDIR 

[facility]


Discontinue Previous Narcotic Pain Medications And Benzodiazepines  1 each FS 

.ONE [facility]


   Stop: 03/08/21 17:00


Ondansetron HCl (Ondansetron Pf 4 Mg/2 Ml Vial)  4 mg IVP Q6H PRN


   PRN Reason: Nausea/Vomiting


Pantoprazole Sodium (Pantoprazole 40 Mg Granules Packet)  40 mg PER TUBE BID [facility]


   Last Admin: 02/14/21 08:34 Dose:  Not Given


   Documented by: 


Sodium Chloride (Flush - Normal Saline 10 Ml Syringe)  10 ml IVF Q12HR [facility]O


   Last Admin: 02/14/21 08:34 Dose:  10 ml


   Documented by: 


Sodium Chloride (Flush - Normal Saline 10 Ml Syringe)  10 ml IVF PRN PRN


   PRN Reason: Saline Flush


   Last Admin: 02/11/21 20:54 Dose:  10 ml


   Documented by: 








Vital Signs & Weight: 


                                   Vital Signs











  Pulse Resp Pulse Ox


 


 02/14/21 06:34  88   98


 


 02/14/21 02:51  83  22 H  100


 


 02/14/21 01:05  94  23 H  100


 


 02/14/21 00:21  121 H  29 H  95








                                        











Admit Weight                   162 lb 0.636 oz


 


Weight                         149 lb 14.629 oz

















- Medication Contraindications


No Anticoagulant reason: Anticoagulant not tolerated





- Physical Exam


General: other (alert, up in chair, confused)


HEENT: normocephaly


Neck: no JVD/HJR


Cardiac: regular rate and rhythm (occ. ectopy,PVC's and PAC's.)


Lungs: clear to auscultation (decreased insp. effort.)


Neuro: weakness


Abdomen: active bowel sounds


Extremities: no edema





- Labs


Result Diagrams: 


                                 02/13/21 03:25





                                 02/14/21 00:40


                                  Troponin/CKMB











Troponin I  0.685 ng/mL (< 0.028)  H*  02/07/21  03:38    














- Telemetry


Sinus rhythms and dysrhythmias: sinus rhythm (pac's,pvc's)





- Assessment/Plan


Assessment/Plan: 





1. Hyperkalemia, resolved.


2. Cardiac arrest secondary to hyperkalemia


3. Hypokalemia, resolved.


4. Paroxysmal afib. remains in sinus.Occ. PVC,PAC. Started on amiodarone last pm

for Afib. tolerating thus far., switch to po likely after loaded.


5. Recent COVID-19 infection (Dec-2020). remains ventilated.


6. 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


7. Anemia, 2a to GI bleed.


8. Presence of a watchman device. 


9. Tachy/Alfredito syndrome. 








PLAN:


- Continue supportive care.


- Agree she will likely need a device as she goes tachycardic in afib and 

bradycardic with medications. 


- Most likely her severe bradycardia was more induced by hyperkalemia. 


- Agree with PPM/AICD per EP 


- CV stable.

## 2021-02-14 NOTE — PDOC.HOSPP
- Subjective


Encounter Date: 02/14/21


Encounter Time: 12:30


Subjective: 


Patient seen and examined for respiratory failure.  Denies any new complaints.  

Extubated yesterday.  No chest pain or palpitations reported.





- Objective


Vital Signs & Weight: 


                             Vital Signs (12 hours)











  Temp Pulse Pulse Ox


 


 02/14/21 16:00  97.9 F  


 


 02/14/21 14:06    92 L


 


 02/14/21 12:00  97.4 F L  


 


 02/14/21 11:39    95


 


 02/14/21 08:00  98.3 F  


 


 02/14/21 06:34   88  98








                                     Weight











Admit Weight                   162 lb 0.636 oz


 


Weight                         149 lb 14.629 oz











                            Most Recent Monitor Data











Heart Rate from ECG            81


 


NIBP                           127/69


 


NIBP BP-Mean                   88


 


Respiration from ECG           25


 


SpO2                           92














I&O: 


                                        











 02/13/21 02/14/21 02/15/21





 06:59 06:59 06:59


 


Intake Total 640 2065 1400


 


Output Total 810 1886 335


 


Balance -











Result Diagrams: 


                                 02/13/21 03:25





                                 02/14/21 00:40


Additional Labs: 


                                   Accuchecks











  02/14/21 02/14/21 02/14/21





  16:20 10:03 03:54


 


POC Glucose  232 H  178 H  200 H














  02/13/21





  22:30


 


POC Glucose  204 H








                                        





Abnormal Lab Results - Last 48 hrs





02/13/21 03:25: BUN 27 H


02/13/21 03:25: RBC 3.29 L, Hgb 8.9 L, Hct 28.5 L, MCHC 31.3 L, RDW 16.0 H, Ne

utrophils % 81.3 H, Lymphocytes % 9.5 L, Neutrophils # 8.2 H, Lymphocytes # 1.0 

L, Monocytes # 0.9 H


02/14/21 00:40: Potassium 3.3 L, Carbon Dioxide 32 H, BUN 24 H, Alkaline P

hosphatase 528 H, Albumin 2.7 L, Albumin/Globulin Ratio 0.9 L


02/14/21 09:28:  H





Microbiology - Entire Visit





02/06/21 14:16   Venous blood - Right Arm   Blood Culture - Final


                            NO GROWTH IN 5 DAYS


02/06/21 14:16   Venous blood - Right Arm   Blood Culture - Final


                            NO GROWTH IN 5 DAYS


02/06/21 22:55   Sacral - Pending   Bacterial Culture - Final


                            Methicillin resistant S.aureus


                            Enterococcus faecalis


02/06/21 16:33   Urine tapia catheter   Urine Culture - Final








Radiology Reviewed by me: Yes (Chest x-rayno new infiltrate)


EKG Reviewed by me: Yes (Sinus rhythm on telemetry)





Hospitalist ROS





- Review of Systems


Cardiovascular: denies: chest pain, palpitations, orthopnea, paroxysmal noc. 

dyspnea, edema, light headedness, other


Gastrointestinal: denies: nausea, vomiting, abdominal pain, diarrhea, 

constipation, melena, hematochezia, other





- Medication


Medications: 


Active Medications











Generic Name Dose Route Start Last Admin





  Trade Name Freq  PRN Reason Stop Dose Admin


 


Enoxaparin Sodium  40 mg  02/13/21 21:00  02/13/21 20:42





  Enoxaparin Sodium 40 Mg/0.4 Ml Syringe  SC   40 mg





  2100 SILVIANO   Administration


 


Vancomycin HCl 1 gm/ Device  200 mls @ 200 mls/hr  02/10/21 20:00  02/14/21 

08:32





  IVPB   200 mls





  0800,2000 SILVIANO   Administration


 


Cefepime HCl 1 gm/ Sodium  100 mls @ 200 mls/hr  02/10/21 21:00  02/14/21 09:32





  Chloride  IVPB   100 mls





  Q12HR SILVIANO   Administration


 


Dexmedetomidine HCl 400 mcg/  100 mls @ 0 mls/hr  02/11/21 10:00  02/11/21 10:06





  Sodium Chloride  IVPB   100 mls





  INF SILVIANO   Administration





  Protocol  





  Per Protocol  


 


Insulin Glargine 10 units/  0.1 mls @ 0 mls/hr  02/14/21 09:00  02/14/21 10:51





  Miscellaneous Medication  SC   0.1 mls





  QAM SILVIANO   Administration


 


Amiodarone HCl 450 mg/  259 mls @ 0 mls/hr  02/14/21 00:15  02/14/21 09:31





Miscellaneous Medication 1  IVPB   259 mls





each/ Dextrose/Water  INF SILVIANO   Administration





  Protocol  





  As Directed  


 


Sodium Chloride  500 mls @ 999 mls/hr  02/14/21 17:30  02/14/21 17:36





  Normal Saline 0.9%  IVPB  02/14/21 18:01  500 mls





  NOW SILVIANO   Administration


 


Insulin Human Regular  0 units  02/13/21 10:28  02/14/21 16:31





  Insulin Regular 300 Units/3 Ml Vial  SC   4 unit





  .MODERATE SLIDING SC PRN   Administration





  Moderate Correctional Scale  


 


Levothyroxine Sodium  125 mcg  02/07/21 06:00  02/14/21 05:51





  Levothyroxine Sodium 125 Mcg Tab  PO   125 mcg





  0600 SILVIANO   Administration


 


Pantoprazole Sodium  40 mg  02/12/21 21:00  02/14/21 08:34





  Pantoprazole 40 Mg Granules Packet  PER TUBE   Not Given





  BID SILVIANO  


 


Sodium Chloride  10 ml  02/07/21 09:00  02/14/21 08:34





  Flush - Normal Saline 10 Ml Syringe  IVF   10 ml





  Q12HR SILVIANO   Administration


 


Sodium Chloride  10 ml  02/07/21 00:45  02/11/21 20:54





  Flush - Normal Saline 10 Ml Syringe  IVF   10 ml





  PRN PRN   Administration





  Saline Flush  














Hospitalist Exam


Vitals: 


                             Vital Signs (12 hours)











  Temp Pulse Pulse Ox


 


 02/14/21 16:00  97.9 F  


 


 02/14/21 14:06    92 L


 


 02/14/21 12:00  97.4 F L  


 


 02/14/21 11:39    95


 


 02/14/21 08:00  98.3 F  


 


 02/14/21 06:34   88  98








                                     Weight











Admit Weight                   162 lb 0.636 oz


 


Weight                         149 lb 14.629 oz











                            Most Recent Monitor Data











Heart Rate from ECG            81


 


NIBP                           127/69


 


NIBP BP-Mean                   88


 


Respiration from ECG           25


 


SpO2                           92














General Appearance: awake alert


Heart: RRR, no gallops


Respiratory: no wheezes, no ronchi


Gastrointestinal: soft, non-distended


Extremities: no cyanosis


Neurological: no new deficit





Hosp A/P


(1) Cardiac arrest


Code(s): I46.9 - CARDIAC ARREST, CAUSE UNSPECIFIED   Status: Acute   





(2) Acute respiratory failure with hypoxia


Code(s): J96.01 - ACUTE RESPIRATORY FAILURE WITH HYPOXIA   Status: Acute   





(3) CAD (coronary artery disease)


Code(s): I25.10 - ATHSCL HEART DISEASE OF NATIVE CORONARY ARTERY W/O ANG PCTRS  

Status: Chronic   


Qualifiers: 


   Coronary Disease-Associated Artery/Lesion type: bypass graft   Native vs. 

transplanted heart: native heart   Associated angina: without angina   Qualified

Code(s): I25.810 - Atherosclerosis of coronary artery bypass graft(s) without 

angina pectoris   





(4) DM type 2 (diabetes mellitus, type 2)


Status: Chronic   


Qualifiers: 


   Diabetes mellitus long term insulin use: with long term use 





(5) HTN (hypertension)


Code(s): I10 - ESSENTIAL (PRIMARY) HYPERTENSION   Status: Chronic   


Qualifiers: 


   Hypertension type: essential hypertension   Qualified Code(s): I10 - [place]

tial (primary) hypertension   





(6) Hyperkalemia


Code(s): E87.5 - HYPERKALEMIA   





(7) ARJUN (acute kidney injury)


Code(s): N17.9 - ACUTE KIDNEY FAILURE, UNSPECIFIED   





- Plan





Extubated yesterday.  Continue supportive care.  On amiodarone drip.  Replace 

electrolytes. A.m. labs.  PT/OT/speech.  Continue empiric antibiotics.  Gentle 

IV hydration until cleared by speech therapy.  Continue current dose of Lantus. 

Patient has elevated alkaline phosphatase.  Patient has history of 

cholecystectomy in the past.  Plan of care was discussed with the patient and 

the family.  Continue sliding scale continue wound care.

## 2021-02-14 NOTE — PRG
DATE OF SERVICE:  02/14/2021



SUBJECTIVE:  Ms. Garza is a 75-year-old woman, admitted on 02/06/2021 following

acute cardiac arrest, which required CPR. 



She was on mechanical ventilator support up until yesterday. 



Sedatives were weaned and the patient ultimately was extubated without incident. 



This morning, she is awake and alert. 



She reports no chest pain, dyspnea, or syncope. 



Urinary output is adequate for age and weight.



OBJECTIVE:  VITAL SIGNS:  This morning include blood pressure 117/86, pulse 103 and

irregular, respiratory rate is 26, maximum temperature in last 24 hours 98.7 degrees

Fahrenheit, oxygen saturation 95% on 2 L by nasal cannula oxygen. 

HEENT:  Pupils are equal, round, reactive to light and accommodation.  She has no

jugular venous distention noted. 

HEART:  Reveals irregular rate and irregular rhythm. 

LUNGS:  Reveals scattered rhonchi.  Breathing regular and nonlabored. 

ABDOMEN:  Soft, nontender, and nondistended. 

NEUROLOGIC:  Reveals no focal deficits present.



LABORATORY FINDINGS:  Today includes a metabolic profile; sodium 144, potassium 3.3,

chloride is 101, bicarb is 32, BUN 24, creatinine 0.61, glucose is 263, magnesium

1.8, and phosphorus is 3.1. 



IMPRESSIONS:  

1. Status post cardiac arrest, the patient is currently hemodynamically stable.

2. Resolved acute post cardiac arrest, respiratory failure.

3. Acute hypokalemia.

4. Acute hypomagnesemia.



PLAN:  

1. Correct abnormal electrolytes.

2. Increase activity per physical and occupational therapy.  We will ask Speech and

Language pathologist to evaluate the patient for both cognition and swallow

function. 

3. We will initiate oral intake as recommended by Speech and Language pathology. 



I will ask case management to begin __________







Job ID:  [id number]

## 2021-02-14 NOTE — RAD
PORTABLE CHEST ONE VIEW:

 

HISTORY:

Respiratory insufficiency.

 

COMPARISON:

02/13/2021

 

FINDINGS:

Considerable rotation to the right. Endotracheal tube and NG tubes have been removed. Patchy bilatera
l interstitial and alveolar parenchymal changes with evidence for right pleural effusion. No signific
ant new process.

 

IMPRESSION:

Removal of the nasogastric tube and endotracheal tube; otherwise stable examination.

 

Continued short-term followup.

 

POS: RRE

## 2021-02-15 LAB
ALBUMIN SERPL BCG-MCNC: 2.9 G/DL (ref 3.4–4.8)
ALP SERPL-CCNC: 514 U/L (ref 40–110)
ALT SERPL W P-5'-P-CCNC: 27 U/L (ref 8–55)
ANION GAP SERPL CALC-SCNC: 15 MMOL/L (ref 10–20)
AST SERPL-CCNC: 24 U/L (ref 5–34)
BILIRUB SERPL-MCNC: 0.5 MG/DL (ref 0.2–1.2)
BUN SERPL-MCNC: 16 MG/DL (ref 9.8–20.1)
CALCIUM SERPL-MCNC: 8.6 MG/DL (ref 7.8–10.44)
CHLORIDE SERPL-SCNC: 103 MMOL/L (ref 98–107)
CO2 SERPL-SCNC: 26 MMOL/L (ref 23–31)
CREAT CL PREDICTED SERPL C-G-VRATE: 78 ML/MIN (ref 70–130)
GLOBULIN SER CALC-MCNC: 3.5 G/DL (ref 2.4–3.5)
GLUCOSE SERPL-MCNC: 311 MG/DL (ref 83–110)
HGB BLD-MCNC: 11.7 G/DL (ref 12–16)
MAGNESIUM SERPL-MCNC: 2.2 MG/DL (ref 1.6–2.6)
MCH RBC QN AUTO: 26.7 PG (ref 27–31)
MCV RBC AUTO: 88.5 FL (ref 78–98)
MDIFF COMPLETE?: YES
OVALOCYTES BLD QL SMEAR: (no result) (100X)
PLATELET # BLD AUTO: 484 THOU/UL (ref 130–400)
POLYCHROMASIA BLD QL SMEAR: (no result) (100X)
POTASSIUM SERPL-SCNC: 4.7 MMOL/L (ref 3.5–5.1)
RBC # BLD AUTO: 4.38 MILL/UL (ref 4.2–5.4)
SODIUM SERPL-SCNC: 139 MMOL/L (ref 136–145)
STOMATOCYTES BLD QL SMEAR: (no result) (100X)
TARGETS BLD QL SMEAR: (no result) (100X)
WBC # BLD AUTO: 18.8 THOU/UL (ref 4.8–10.8)

## 2021-02-15 PROCEDURE — 5A09457 ASSISTANCE WITH RESPIRATORY VENTILATION, 24-96 CONSECUTIVE HOURS, CONTINUOUS POSITIVE AIRWAY PRESSURE: ICD-10-PCS | Performed by: INTERNAL MEDICINE

## 2021-02-15 RX ADMIN — Medication SCH ML: at 00:31

## 2021-02-15 RX ADMIN — Medication SCH: at 14:44

## 2021-02-15 RX ADMIN — Medication SCH: at 20:55

## 2021-02-15 RX ADMIN — POTASSIUM CHLORIDE, DEXTROSE MONOHYDRATE AND SODIUM CHLORIDE SCH: 150; 5; 450 INJECTION, SOLUTION INTRAVENOUS at 14:44

## 2021-02-15 RX ADMIN — PANTOPRAZOLE SODIUM SCH: 40 GRANULE, DELAYED RELEASE ORAL at 14:43

## 2021-02-15 RX ADMIN — INSULIN GLARGINE SCH: 100 INJECTION, SOLUTION SUBCUTANEOUS at 14:43

## 2021-02-15 RX ADMIN — PANTOPRAZOLE SODIUM SCH: 40 GRANULE, DELAYED RELEASE ORAL at 22:22

## 2021-02-15 RX ADMIN — VANCOMYCIN HYDROCHLORIDE SCH MLS: 1 INJECTION, SOLUTION INTRAVENOUS at 20:39

## 2021-02-15 RX ADMIN — VANCOMYCIN HYDROCHLORIDE SCH: 1 INJECTION, SOLUTION INTRAVENOUS at 14:43

## 2021-02-15 NOTE — PDOC.HOSPP
- Subjective


Encounter Date: 02/15/21


Encounter Time: 13:00


Subjective: 


Patient seen and examined for respiratory failure.  Placed on noninvasive 

positive pressure ventilation due to respiratory distress and hypoxemia.  Events

noted.





- Objective


Vital Signs & Weight: 


                             Vital Signs (12 hours)











  Pulse Resp Pulse Ox


 


 02/15/21 14:31  91  28 H  100








                                     Weight











Admit Weight                   162 lb 0.636 oz


 


Weight                         149 lb 14.629 oz











                            Most Recent Monitor Data











Heart Rate from ECG            92


 


NIBP                           113/66


 


NIBP BP-Mean                   81


 


Respiration from ECG           30


 


SpO2                           100














I&O: 


                                        











 02/14/21 02/15/21 02/16/21





 06:59 06:59 06:59


 


Intake Total 2065 2095 1773


 


Output Total 1886 615 


 


Balance 179 1480 1773











Result Diagrams: 


                                 02/15/21 10:08





                                 02/15/21 03:30


Additional Labs: 


                                   Accuchecks











  02/15/21 02/15/21 02/14/21





  15:12 09:32 22:48


 


POC Glucose  243 H  246 H  191 H








                                        





Abnormal Lab Results - Last 48 hrs





02/14/21 00:40: Potassium 3.3 L, Carbon Dioxide 32 H, BUN 24 H, Alkaline 

Phosphatase 528 H, Albumin 2.7 L, Albumin/Globulin Ratio 0.9 L


02/14/21 09:28:  H


02/15/21 03:30: Alkaline Phosphatase 514 H, Albumin 2.9 L, Albumin/Globulin 

Ratio 0.8 L


02/15/21 10:08: WBC 18.8 H, Hgb 11.7 L, MCH 26.7 L, MCHC 30.2 L, RDW 16.4 H, Plt

Count 484 H, Band Neuts % (Manual) 18 H, Lymphocytes % (Manual) 7 L, Myelocytes 

% 1 H, Plt Morphology Comment Appears Increased H





Microbiology - Entire Visit





02/06/21 14:16   Venous blood - Right Arm   Blood Culture - Final


                            NO GROWTH IN 5 DAYS


02/06/21 14:16   Venous blood - Right Arm   Blood Culture - Final


                            NO GROWTH IN 5 DAYS


02/06/21 22:55   Sacral - Pending   Bacterial Culture - Final


                            Methicillin resistant S.aureus


                            Enterococcus faecalis


02/06/21 16:33   Urine tapia catheter   Urine Culture - Final








EKG Reviewed by me: Yes (Sinus rhythm on telemetry)





Hospitalist ROS





- Review of Systems


Cardiovascular: denies: chest pain, palpitations, orthopnea, paroxysmal noc. 

dyspnea, edema, light headedness, other


Gastrointestinal: denies: nausea, vomiting, abdominal pain, diarrhea, 

constipation, melena, hematochezia, other





- Medication


Medications: 


Active Medications











Generic Name Dose Route Start Last Admin





  Trade Name Freq  PRN Reason Stop Dose Admin


 


Enoxaparin Sodium  40 mg  02/13/21 21:00  02/14/21 20:56





  Enoxaparin Sodium 40 Mg/0.4 Ml Syringe  SC   40 mg





  2100 SILVIANO   Administration


 


Vancomycin HCl 1 gm/ Device  200 mls @ 200 mls/hr  02/10/21 20:00  02/15/21 1

4:43





  IVPB   Not Given





  0800,2000 [facility]  


 


Cefepime HCl 1 gm/ Sodium  100 mls @ 200 mls/hr  02/10/21 21:00  02/15/21 14:43





  Chloride  IVPB   Not Given





  Q12HR SILVIANO  


 


Dexmedetomidine HCl 400 mcg/  100 mls @ 0 mls/hr  02/11/21 10:00  02/11/21 10:06





  Sodium Chloride  IVPB   100 mls





  INF SILVIANO   Administration





  Protocol  





  Per Protocol  


 


Insulin Glargine 10 units/  0.1 mls @ 0 mls/hr  02/14/21 09:00  02/15/21 14:43





  Miscellaneous Medication  SC   Not Given





  QAM [facility]  


 


Amiodarone HCl 450 mg/  259 mls @ 0 mls/hr  02/14/21 00:15  02/14/21 09:31





Miscellaneous Medication 1  IVPB   259 mls





each/ Dextrose/Water  INF SILVIANO   Administration





  Protocol  





  As Directed  


 


Potassium Chloride/Dextrose/Sod Cl  1,000 mls @ 50 mls/hr  02/14/21 17:30  

02/15/21 14:44





  D5 1/2 Ns W/20 Meq Kcl  IV   Not Given





  .Q20H [facility]  


 


Insulin Human Regular  0 units  02/13/21 10:28  02/14/21 16:31





  Insulin Regular 300 Units/3 Ml Vial  SC   4 unit





  .MODERATE SLIDING SC PRN   Administration





  Moderate Correctional Scale  


 


Levothyroxine Sodium  125 mcg  02/07/21 06:00  02/15/21 14:43





  Levothyroxine Sodium 125 Mcg Tab  PO   Not Given





  0600 [facility]  


 


Pantoprazole Sodium  40 mg  02/12/21 21:00  02/15/21 14:43





  Pantoprazole 40 Mg Granules Packet  PER TUBE   Not Given





  BID SILVIANO  


 


Sodium Chloride  10 ml  02/07/21 09:00  02/15/21 14:44





  Flush - Normal Saline 10 Ml Syringe  IVF   Not Given





  Q12HR SILVIANO  


 


Sodium Chloride  10 ml  02/07/21 00:45  02/11/21 20:54





  Flush - Normal Saline 10 Ml Syringe  IVF   10 ml





  PRN PRN   Administration





  Saline Flush  














Hospitalist Exam


Vitals: 


                             Vital Signs (12 hours)











  Pulse Resp Pulse Ox


 


 02/15/21 14:31  91  28 H  100








                                     Weight











Admit Weight                   162 lb 0.636 oz


 


Weight                         149 lb 14.629 oz











                            Most Recent Monitor Data











Heart Rate from ECG            92


 


NIBP                           113/66


 


NIBP BP-Mean                   81


 


Respiration from ECG           30


 


SpO2                           100














General - other findings: On NIPPV


Heart: RRR, no gallops


Respiratory: rales, rhonchi


Gastrointestinal: soft, no guarding, no rigidity


Extremities: no cyanosis


Neurological - other findings: Neuro/psychexam limited due to current clinical 

status


Musculoskeletal: generalized weakness





Hosp A/P


(1) Cardiac arrest


Code(s): I46.9 - CARDIAC ARREST, CAUSE UNSPECIFIED   Status: Acute   





(2) Acute respiratory failure with hypoxia


Code(s): J96.01 - ACUTE RESPIRATORY FAILURE WITH HYPOXIA   Status: Acute   





(3) CAD (coronary artery disease)


Code(s): I25.10 - ATHSCL HEART DISEASE OF NATIVE CORONARY ARTERY W/O ANG PCTRS  

Status: Chronic   


Qualifiers: 


   Coronary Disease-Associated Artery/Lesion type: bypass graft   Native vs. 

transplanted heart: native heart   Associated angina: without angina   Qualified

Code(s): I25.810 - Atherosclerosis of coronary artery bypass graft(s) without 

angina pectoris   





(4) DM type 2 (diabetes mellitus, type 2)


Status: Chronic   


Qualifiers: 


   Diabetes mellitus long term insulin use: with long term use 





(5) ARJUN (acute kidney injury)


Code(s): N17.9 - ACUTE KIDNEY FAILURE, UNSPECIFIED   





(6) HTN (hypertension)


Code(s): I10 - ESSENTIAL (PRIMARY) HYPERTENSION   Status: Chronic   


Qualifiers: 


   Hypertension type: essential hypertension   Qualified Code(s): I10 - 

Essential (primary) hypertension   





(7) Hyperkalemia


Code(s): E87.5 - HYPERKALEMIA   





- Plan





Extubated on 2/13.  Continue NIPPV as needed.  Patient was made DNR today after 

discussion with the spouse and the daughter.  Continue gentle hydration due to 

poor oral intake.  Continue supportive care.  Amiodarone drip per cardiology.  O

n empiric antibiotics.  Will consult infectious disease for recommendation on 

treatment of decubitus ulcer.   A.m. labs.  PT/OT/speech.  Continue empiric 

antibiotics.  Reason for isolated elevated alkaline phosphatase with normal AST,

ALT and bilirubin is unclear.

## 2021-02-15 NOTE — RAD
Portable frontal chest radiograph:

2/15/2021



COMPARISON: 2/14/2021



HISTORY: Ventilated patient, respiratory distress



FINDINGS: No endotracheal tube is present on this examination. Stable prominence of the cardiac silho
uette. Midline sternotomy wires are noted.



There is pulmonary vascular congestion with interstitial and alveolar opacity in the perihilar region
s and both lung bases, right greater than left, slightly worsened in the perihilar regions

bilaterally. Bilateral small pleural effusions are noted, right larger than left.



IMPRESSION: Slight interval worsening in bilateral perihilar aeration. Findings are most consistent w
ith mildly worsened interstitial and alveolar pulmonary edema. Infectious pneumonitis or aspiration

cannot be excluded. Follow-up to resolution advised.



Reported By: Delroy Colvin 

Electronically Signed:  2/15/2021 10:11 AM

## 2021-02-15 NOTE — PRG
DATE OF SERVICE:  02/15/2021



SUBJECTIVE:  Ms. Garza is on BiPAP.



OBJECTIVE:  VITAL SIGNS:  Blood pressure 147/90, heart rate is 114.  She is in

atrial fibrillation. 

LUNGS:  Remarkable for equal breath sounds. 

HEART: __________ rhythm. 

ABDOMEN:  Soft. 

EXTREMITIES:  Without asymmetry.



IMAGING:  Chest x-rays unchanged.  She has finding suggestive of pulmonary edema. 



Her minute volume on BiPAP is 15 liters per minute.  I talked to the .  He

does not want her reintubated.  We will continue with current care. 







Job ID:  [id number]

## 2021-02-15 NOTE — PRG
DATE OF SERVICE:  02/15/2021



OBJECTIVE:  The patient noted with the following; 



VITAL SIGNS:  Blood pressure 113/66, pulse of 92, respiratory rate of 26, O2

saturation 100%. 

HEENT:  Remarkable for BiPAP face mask in place. 

CARDIOVASCULAR SYSTEMS:  First and second heart sounds were heard. 

RESPIRATORY SYSTEM:  Revealed a lot of transmitted sounds. 

DIGESTIVE SYSTEM:  Revealed a benign abdomen. 

EXTREMITIES:  No peripheral edema. 

SKIN:  No new gross rash. 

LYMPHATICS:  No peripheral lymphadenopathy.



IMPRESSION:  

1. Cardiac arrest in the context of hyperkalemia, status post hemodialysis.

2. Respiratory failure, on BiPAP.



PLAN:  

1. We will continue with current renal supportive measures.

2. Further management to be dependent on the clinical course.







Job ID:  [id number]

## 2021-02-16 LAB
ALBUMIN SERPL BCG-MCNC: 2.6 G/DL (ref 3.4–4.8)
ALP SERPL-CCNC: 407 U/L (ref 40–110)
ALT SERPL W P-5'-P-CCNC: 21 U/L (ref 8–55)
ANALYZER IN CARDIO: (no result)
ANION GAP SERPL CALC-SCNC: 14 MMOL/L (ref 10–20)
AST SERPL-CCNC: 18 U/L (ref 5–34)
BASE EXCESS STD BLDA CALC-SCNC: 1.6 MEQ/L
BASOPHILS # BLD AUTO: 0.1 THOU/UL (ref 0–0.2)
BASOPHILS NFR BLD AUTO: 0.5 % (ref 0–1)
BILIRUB SERPL-MCNC: 0.4 MG/DL (ref 0.2–1.2)
BUN SERPL-MCNC: 14 MG/DL (ref 9.8–20.1)
CA-I BLDA-SCNC: 1.13 MMOL/L (ref 1.12–1.3)
CALCIUM SERPL-MCNC: 8.1 MG/DL (ref 7.8–10.44)
CHLORIDE SERPL-SCNC: 106 MMOL/L (ref 98–107)
CO2 SERPL-SCNC: 24 MMOL/L (ref 23–31)
CREAT CL PREDICTED SERPL C-G-VRATE: 93 ML/MIN (ref 70–130)
EOSINOPHIL # BLD AUTO: 0.3 THOU/UL (ref 0–0.7)
EOSINOPHIL NFR BLD AUTO: 1.8 % (ref 0–10)
GLOBULIN SER CALC-MCNC: 3.1 G/DL (ref 2.4–3.5)
GLUCOSE SERPL-MCNC: 184 MG/DL (ref 83–110)
HCO3 BLDA-SCNC: 23.8 MEQ/L (ref 22–28)
HGB BLD-MCNC: 10.5 G/DL (ref 12–16)
LYMPHOCYTES # BLD: 2 THOU/UL (ref 1.2–3.4)
LYMPHOCYTES NFR BLD AUTO: 14.2 % (ref 21–51)
MCH RBC QN AUTO: 26.7 PG (ref 27–31)
MCV RBC AUTO: 86.6 FL (ref 78–98)
MONOCYTES # BLD AUTO: 1.1 THOU/UL (ref 0.11–0.59)
MONOCYTES NFR BLD AUTO: 7.7 % (ref 0–10)
NEUTROPHILS # BLD AUTO: 10.4 THOU/UL (ref 1.4–6.5)
NEUTROPHILS NFR BLD AUTO: 75.8 % (ref 42–75)
O2 A-A PPRESDIFF RESPIRATORY: 154.62 MMHG (ref 0–20)
PCO2 BLDA: 31.1 MMHG (ref 35–45)
PH BLDA: 7.5 [PH] (ref 7.35–7.45)
PLATELET # BLD AUTO: 398 THOU/UL (ref 130–400)
PO2 BLDA: 91.7 MMHG (ref 70–?)
POTASSIUM BLD-SCNC: 3.83 MMOL/L (ref 3.7–5.3)
POTASSIUM SERPL-SCNC: 4.1 MMOL/L (ref 3.5–5.1)
RBC # BLD AUTO: 3.93 MILL/UL (ref 4.2–5.4)
SODIUM SERPL-SCNC: 140 MMOL/L (ref 136–145)
SPECIMEN DRAWN FROM PATIENT: (no result)
WBC # BLD AUTO: 13.8 THOU/UL (ref 4.8–10.8)

## 2021-02-16 RX ADMIN — Medication SCH ML: at 10:42

## 2021-02-16 RX ADMIN — VANCOMYCIN HYDROCHLORIDE SCH MLS: 1 INJECTION, SOLUTION INTRAVENOUS at 08:30

## 2021-02-16 RX ADMIN — INSULIN GLARGINE SCH MLS: 100 INJECTION, SOLUTION SUBCUTANEOUS at 08:12

## 2021-02-16 RX ADMIN — INSULIN HUMAN PRN UNIT: 100 INJECTION, SOLUTION PARENTERAL at 10:49

## 2021-02-16 RX ADMIN — Medication SCH: at 20:26

## 2021-02-16 RX ADMIN — POTASSIUM CHLORIDE, DEXTROSE MONOHYDRATE AND SODIUM CHLORIDE SCH: 150; 5; 450 INJECTION, SOLUTION INTRAVENOUS at 10:42

## 2021-02-16 RX ADMIN — INSULIN HUMAN PRN UNIT: 100 INJECTION, SOLUTION PARENTERAL at 17:11

## 2021-02-16 RX ADMIN — AMIODARONE HYDROCHLORIDE SCH MLS: 50 INJECTION, SOLUTION INTRAVENOUS at 07:57

## 2021-02-16 NOTE — PRG
DATE OF SERVICE:  02/16/2021



SUBJECTIVE:  The patient noted with the following vital signs.



OBJECTIVE:  VITAL SIGNS:  Blood pressure 129/88, heart rate of 95, respiratory rate

of 30, O2 saturations of 98% to 100%. 

HEENT:  Unremarkable. 

CARDIOVASCULAR SYSTEM:  First and second heart sounds were heard. 

RESPIRATORY SYSTEM:  Clear to auscultation. 

DIGESTIVE SYSTEM:  Revealed a benign abdomen. 

EXTREMITIES:  Show no significant peripheral edema. 

SKIN:  No new gross rash. 

LYMPHATICS:  No peripheral lymphadenopathy.



IMPRESSION:  

1. Cardiac arrest in the context of severe hyperkalemia.

2. Severe hyperkalemia, status post hemodialysis x1.

3. Cardiopulmonary failure, on life support.



PLAN:  

1. We will continue current management.

2. __________.

3. Further management to be dependent on the clinical course.







Job ID:  [id number]

## 2021-02-16 NOTE — RAD
EXAM: Single view of the chest



HISTORY:   Ventilated patient with respiratory failure



COMPARISON: 2/15/2021



FINDINGS: Single view of the chest shows an enlarged but stable cardiomediastinal silhouette.  The pa
tient is status post sternotomy.  There appear to be small bilateral pleural effusions, right

greater than left. Bibasilar infiltrates versus atelectasis are also seen. Degenerative changes are s
een in the spine.



IMPRESSION: Stable exam



Reported By: David Doyle 

Electronically Signed:  2/16/2021 8:17 AM

## 2021-02-16 NOTE — PDOC.CPN
- Subjective


Date: 02/16/21


Time: 12:33


Interval history: 





She is now extubated. 





- Review of Systems


General: denies: fever/chills, weight/appetite/sleep changes, night sweats, 

fatigue


Respiratory: denies: cough, congestion, shortness of breath, exercise 

intolerance


Cardiovascular: denies: chest pain, palpitation, edema, paroxysmal nocturnal 

dyspnea, orthopnea


Gastrointestinal: denies: nausea, vomiting, diarrhea, constipation, abd pain, GI

bleeding


Musculoskeletal: denies: pain, tenderness, stiffness, swelling, 

arthritis/arthralgias


Neurological: denies: numbness, syncope, seizure, weakness





- Objective


Allergies/Adverse Reactions: 


                                    Allergies











Allergy/AdvReac Type Severity Reaction Status Date / Time


 


Anesthetics - Amide Type - Allergy   Verified 12/02/20 18:59





Select A     


 


Anesthetics - Miriam Type- Allergy   Verified 12/02/20 18:59





Parabens     


 


atorvastatin Allergy   Verified 01/29/21 16:17


 


codeine [Codeine] Allergy   Verified 01/29/21 16:16


 


esomeprazole Allergy   Verified 01/29/21 16:17


 


levofloxacin [From Levaquin] Allergy   Verified 01/29/21 16:16


 


simvastatin Allergy   Verified 01/29/21 16:17











Visit Medications: 


                               Current Medications





Acetaminophen (Acetaminophen 325 Mg Tab)  650 mg PO Q8H PRN


   PRN Reason: Headache/Fever/Mild Pain (1-3)


Dextrose/Water (Dextrose 50% Abboject 50 Ml Syringe)  25 gm SLOW IVP PRN PRN


   PRN Reason: Hypoglycemia


Enoxaparin Sodium (Enoxaparin Sodium 40 Mg/0.4 Ml Syringe)  40 mg SC 2100 [facility]


   Last Admin: 02/15/21 20:46 Dose:  40 mg


   Documented by: 


Glucagon (Glucagon 1 Mg/Ml Vial)  1 mg IM PRN PRN


   PRN Reason: Hypoglycemia


Vancomycin HCl 1 gm/ Device  200 mls @ 200 mls/hr IVPB 0800,2000 [facility]


   Last Admin: 02/16/21 08:30 Dose:  200 mls


   Documented by: 


Cefepime HCl 1 gm/ Sodium (Chloride)  100 mls @ 200 mls/hr IVPB Q12HR [facility]


   Last Admin: 02/16/21 08:13 Dose:  100 mls


   Documented by: 


Dexmedetomidine HCl 400 mcg/ (Sodium Chloride)  100 mls @ 0 mls/hr IVPB INF SILVIANO;

Protocol


   Last Admin: 02/11/21 10:06 Dose:  100 mls


   Documented by: 


Dextrose/Water (D5w)  1,000 mls @ 0 mls/hr IV .Q0M PRN


   PRN Reason: Hypoglycemia


Insulin Glargine 10 units/ (Miscellaneous Medication)  0.1 mls @ 0 mls/hr SC QAM

[facility]


   Last Admin: 02/16/21 08:12 Dose:  0.1 mls


   Documented by: 


Amiodarone HCl 450 mg/Miscellaneous Medication 1 each/ Dextrose/Water  259 mls @

0 mls/hr IVPB INF SILVIANO; Protocol


   Last Admin: 02/16/21 07:57 Dose:  259 mls


   Documented by: 


Potassium Chloride/Dextrose/Sod Cl (D5 1/2 Ns W/20 Meq Kcl)  1,000 mls @ 50 

mls/hr IV .Q20H [facility]


   Last Admin: 02/16/21 10:42 Dose:  Not Given


   Documented by: 


Insulin Human Regular (Insulin Regular 300 Units/3 Ml Vial)  0 units SC 

.MODERATE SLIDING SC PRN


   PRN Reason: Moderate Correctional Scale


   Last Admin: 02/16/21 10:49 Dose:  2 unit


   Documented by: 


Levothyroxine Sodium (Levothyroxine Sodium 125 Mcg Tab)  125 mcg PO 0600 [facility]


   Last Admin: 02/16/21 05:22 Dose:  Not Given


   Documented by: 


Miscellaneous Medication (Electrolyte Replacement Protocol)  1 each IVPB ASDIR 

[facility]


Discontinue Previous Narcotic Pain Medications And Benzodiazepines  1 each FS 

.ONE [facility]


   Stop: 03/08/21 17:00


Ondansetron HCl (Ondansetron Pf 4 Mg/2 Ml Vial)  4 mg IVP Q6H PRN


   PRN Reason: Nausea/Vomiting


Pantoprazole Sodium (Pantoprazole 40 Mg Tab)  40 mg PO BID [facility]


   Last Admin: 02/16/21 08:13 Dose:  40 mg


   Documented by: 


Saccharomyces Boulardii (Saccharomyces Boulardii 250 Mg Cap)  250 mg PO DAILY 

[facility]


Sodium Chloride (Flush - Normal Saline 10 Ml Syringe)  10 ml IVF Q12HR SILVIANO


   Last Admin: 02/16/21 10:42 Dose:  10 ml


   Documented by: 


Sodium Chloride (Flush - Normal Saline 10 Ml Syringe)  10 ml IVF PRN PRN


   PRN Reason: Saline Flush


   Last Admin: 02/11/21 20:54 Dose:  10 ml


   Documented by: 








Vital Signs & Weight: 


                                   Vital Signs











  Temp Pulse Resp Pulse Ox


 


 02/16/21 08:00     100


 


 02/16/21 07:20   83  20  96


 


 02/16/21 04:00  96.8 F L   


 


 02/16/21 03:01   97  26 H  96








                                        











Admit Weight                   162 lb 0.636 oz


 


Weight                         160 lb 7.944 oz

















- Medication Contraindications


No Anticoagulant reason: Anticoagulant not tolerated





- Physical Exam


General: alert & oriented x3


HEENT: mucus membranes moist


Neck: supple neck


Cardiac: irregularly regular


Lungs: clear to auscultation


Neuro: grossly intact


Abdomen: active bowel sounds


Extremities: no edema


Skin: clear


Musculoskeletal: no pain





- Labs


Result Diagrams: 


                                 02/16/21 05:43





                                 02/16/21 05:43


                                  Troponin/CKMB











Troponin I  0.685 ng/mL (< 0.028)  H*  02/07/21  03:38    














- Telemetry


Supraventricular conduction: atrial fibrillation





- Assessment/Plan


Assessment/Plan: 





1. Hyperkalemia, resolved.


2. Cardiac arrest secondary to hyperkalemia


3. Hypokalemia, resolved.


4. Paroxysmal afib. Currently in afib. 


5. Recent COVID-19 infection (Dec-2020). remains ventilated.


6. 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


7. Anemia, 2a to GI bleed.


8. Presence of a watchman device. 


9. Tachy/Alfredito syndrome. 








PLAN:


- Lenient rate control. 


- PPM in the near future.


- Continue amiodarone dripo for now. 


- Would restart PO diltiazem and wean amio gtt off.

## 2021-02-16 NOTE — PDOC.HOSPP
- Subjective


Encounter Date: 02/16/21


Encounter Time: 11:00


Subjective: 


Patient seen and examined for respiratory failure.  On nasal cannula.  

Symptomatically feeling better.  Denies any chest pain or palpitations.  No 

overnight events





- Objective


Vital Signs & Weight: 


                             Vital Signs (12 hours)











  Pulse Pulse Pulse Resp BP BP Pulse Ox


 


 02/16/21 12:07   95  113 H   119/65  138/112 H 


 


 02/16/21 10:19   76  91   112/55 L  121/76 


 


 02/16/21 08:00        100


 


 02/16/21 07:20  83    20    96














  Pulse Ox Pulse Ox


 


 02/16/21 12:07  94 L  98


 


 02/16/21 10:19  


 


 02/16/21 08:00  


 


 02/16/21 07:20  








                                     Weight











Admit Weight                   162 lb 0.636 oz


 


Weight                         160 lb 7.944 oz











                            Most Recent Monitor Data











Heart Rate from ECG            92


 


NIBP                           129/78


 


NIBP BP-Mean                   95


 


Respiration from ECG           30


 


SpO2                           97














I&O: 


                                        











 02/15/21 02/16/21 02/17/21





 06:59 06:59 06:59


 


Intake Total 2095 2895 917.0


 


Output Total 615 45 85


 


Balance 1480 2850 832.0











Result Diagrams: 


                                 02/16/21 05:43





                                 02/16/21 05:43


Additional Labs: 


                                   Accuchecks











  02/16/21 02/16/21 02/15/21





  10:49 04:58 23:32


 


POC Glucose  172 H  120 H  162 H








                                        


Abnormal Lab Results - Last 48 hrs





02/15/21 03:30: Alkaline Phosphatase 514 H, Albumin 2.9 L, Albumin/Globulin 

Ratio 0.8 L


02/15/21 10:08: WBC 18.8 H, Hgb 11.7 L, MCH 26.7 L, MCHC 30.2 L, RDW 16.4 H, Plt

Count 484 H, Band Neuts % (Manual) 18 H, Lymphocytes % (Manual) 7 L, Myelocytes 

% 1 H, Plt Morphology Comment Appears Increased H


02/16/21 05:43: Alkaline Phosphatase 407 H, Serum Total Protein 5.7 L, Albumin 

2.6 L, Albumin/Globulin Ratio 0.8 L


02/16/21 05:43: WBC 13.8 H, RBC 3.93 L, Hgb 10.5 L, Hct 34.0 L, MCH 26.7 L, MCHC

30.9 L, RDW 16.4 H, Neutrophils % 75.8 H, Lymphocytes % 14.2 L, Neutrophils # 

10.4 H, Monocytes # 1.1 H


02/16/21 07:35: ABG pH 7.50 H, ABG pCO2 31.1 L, ABG pO2 91.7 H, A-a O2 Gradient 

154.625 H, Sodium 133 L, Chloride 108 H





Microbiology - Entire Visit





02/06/21 14:16   Venous blood - Right Arm   Blood Culture - Final


                            NO GROWTH IN 5 DAYS


02/06/21 14:16   Venous blood - Right Arm   Blood Culture - Final


                            NO GROWTH IN 5 DAYS


02/06/21 22:55   Sacral - Pending   Bacterial Culture - Final


                            Methicillin resistant S.aureus


                            Enterococcus faecalis


02/06/21 16:33   Urine tapia catheter   Urine Culture - Final








Radiology Reviewed by me: Yes (Chest x-rayno new infiltrates)


EKG Reviewed by me: Yes (Irregular rhythm on telemetry)





Hospitalist ROS





- Review of Systems


Cardiovascular: denies: chest pain, palpitations, orthopnea, paroxysmal noc. 

dyspnea, edema, light headedness, other


Gastrointestinal: denies: nausea, vomiting, abdominal pain, diarrhea, cons

tipation, melena, hematochezia, other





- Medication


Medications: 


Active Medications











Generic Name Dose Route Start Last Admin





  Trade Name Freq  PRN Reason Stop Dose Admin


 


Enoxaparin Sodium  40 mg  02/13/21 21:00  02/15/21 20:46





  Enoxaparin Sodium 40 Mg/0.4 Ml Syringe  SC   40 mg





  2100 SILVIANO   Administration


 


Dexmedetomidine HCl 400 mcg/  100 mls @ 0 mls/hr  02/11/21 10:00  02/11/21 10:06





  Sodium Chloride  IVPB   100 mls





  INF SILVIANO   Administration





  Protocol  





  Per Protocol  


 


Insulin Glargine 10 units/  0.1 mls @ 0 mls/hr  02/14/21 09:00  02/16/21 08:12





  Miscellaneous Medication  SC   0.1 mls





  QAM SILVIANO   Administration


 


Amiodarone HCl 450 mg/  259 mls @ 0 mls/hr  02/14/21 00:15  02/16/21 07:57





Miscellaneous Medication 1  IVPB   259 mls





each/ Dextrose/Water  INF SILVIANO   Administration





  Protocol  





  As Directed  


 


Potassium Chloride/Dextrose/Sod Cl  1,000 mls @ 50 mls/hr  02/14/21 17:30  

02/16/21 10:42





  D5 1/2 Ns W/20 Meq Kcl  IV   Not Given





  .Q20H SILVIANO  


 


Insulin Human Regular  0 units  02/13/21 10:28  02/16/21 17:11





  Insulin Regular 300 Units/3 Ml Vial  SC   2 unit





  .MODERATE SLIDING SC PRN   Administration





  Moderate Correctional Scale  


 


Levothyroxine Sodium  125 mcg  02/07/21 06:00  02/16/21 05:22





  Levothyroxine Sodium 125 Mcg Tab  PO   Not Given





  0600 SILVIANO  


 


Pantoprazole Sodium  40 mg  02/16/21 09:00  02/16/21 08:13





  Pantoprazole 40 Mg Tab  PO   40 mg





  BID SILVIANO   Administration


 


Sodium Chloride  10 ml  02/07/21 09:00  02/16/21 10:42





  Flush - Normal Saline 10 Ml Syringe  IVF   10 ml





  Q12HR SILVIANO   Administration


 


Sodium Chloride  10 ml  02/07/21 00:45  02/11/21 20:54





  Flush - Normal Saline 10 Ml Syringe  IVF   10 ml





  PRN PRN   Administration





  Saline Flush  














Hospitalist Exam


Vitals: 


                             Vital Signs (12 hours)











  Pulse Pulse Pulse Resp BP BP Pulse Ox


 


 02/16/21 12:07   95  113 H   119/65  138/112 H 


 


 02/16/21 10:19   76  91   112/55 L  121/76 


 


 02/16/21 08:00        100


 


 02/16/21 07:20  83    20    96














  Pulse Ox Pulse Ox


 


 02/16/21 12:07  94 L  98


 


 02/16/21 10:19  


 


 02/16/21 08:00  


 


 02/16/21 07:20  








                                     Weight











Admit Weight                   162 lb 0.636 oz


 


Weight                         160 lb 7.944 oz











                            Most Recent Monitor Data











Heart Rate from ECG            92


 


NIBP                           129/78


 


NIBP BP-Mean                   95


 


Respiration from ECG           30


 


SpO2                           97














General Appearance: awake alert


Heart: RRR, no gallops


Respiratory: no wheezes, no ronchi


Gastrointestinal: soft, non-distended


Extremities: no cyanosis, no clubbing


Neurological: no new deficit





Hosp A/P


(1) Cardiac arrest


Code(s): I46.9 - CARDIAC ARREST, CAUSE UNSPECIFIED   Status: Acute   





(2) Acute respiratory failure with hypoxia


Code(s): J96.01 - ACUTE RESPIRATORY FAILURE WITH HYPOXIA   Status: Acute   





(3) CAD (coronary artery disease)


Code(s): I25.10 - ATHSCL HEART DISEASE OF NATIVE CORONARY ARTERY W/O ANG PCTRS  

Status: Chronic   


Qualifiers: 


   Coronary Disease-Associated Artery/Lesion type: bypass graft   Native vs. 

transplanted heart: native heart   Associated angina: without angina   Qualified

Code(s): I25.810 - Atherosclerosis of coronary artery bypass graft(s) without 

angina pectoris   





(4) DM type 2 (diabetes mellitus, type 2)


Status: Chronic   


Qualifiers: 


   Diabetes mellitus long term insulin use: with long term use 





(5) ARJUN (acute kidney injury)


Code(s): N17.9 - ACUTE KIDNEY FAILURE, UNSPECIFIED   





(6) HTN (hypertension)


Code(s): I10 - ESSENTIAL (PRIMARY) HYPERTENSION   Status: Chronic   


Qualifiers: 


   Hypertension type: essential hypertension   Qualified Code(s): I10 - 

Essential (primary) hypertension   





(7) Hyperkalemia


Code(s): E87.5 - HYPERKALEMIA   





- Plan





Plan:


Vital signs stable.  Continue gentle hydration until started on p.o.  WBC counts

improving.  Bandemia has resolved.  Alkaline phosphatase improving.  Reason for 

elevated alkaline phosphatase appears to be unclear.  Right upper quadrant 

ultrasound was negative.  Await infectious disease input for infected pressure 

ulcers.  Continue NIPPV as needed.  On amiodarone drip per cardiology.  Started 

on oral Cardizem.  Continue sliding scale.

## 2021-02-16 NOTE — PRG
DATE OF SERVICE:  02/16/2021



SUBJECTIVE:  Carrie Garza is sitting up in the neuro chair on nasal cannula.  She

looks surprisingly much better.  She is still weak and __________ barely lift her

arms off the chair. 



OBJECTIVE:  LUNGS:  Clear. 

HEART:  Regular rhythm.  She is still in atrial fibrillation. 

ABDOMEN:  Soft. 

EXTREMITIES:  Without edema.



LABORATORY DATA:  White count 13.8, hemoglobin 10.5, platelets 398.  Electrolytes

are normal.  Creatinine is normal. 



IMPRESSION:  

1. Extreme deconditioning since she had COVID.

2. Rapid atrial fibrillation.

3. Status post cardiac arrest from hyperkalemia.  Her potassium is 4.1 today.



PLAN:  Continue supportive care.  I would recommend continue with nocturnal BiPAP.







Job ID:  [id number]

## 2021-02-16 NOTE — PDOC.EP
- Subjective


Date: 02/16/21


Time: 15:03


Interval History: 





extubated. Lucid.  Fair historian. She feels weak today otherwise no complaints





- Review of Systems


Constitutional: reports: weakness.  denies: chills, fever, malaise, sweats, 

other


Respiratory: reports: shortness of breath


Cardiology: denies: chest pain, edema, heart racing, light headedness, 

paroxysmal noc. dyspnea, orthopnea, palpitations, passing out, pleuritic pain, 

pressure, swelling, other


Gastrointestinal: denies: abdominal pain, constipation, diarrhea, hematochezia, 

melena, nausea, vomitting, other





- Objective


Allergies/Adverse Reactions: 


                                    Allergies











Allergy/AdvReac Type Severity Reaction Status Date / Time


 


Anesthetics - Amide Type - Allergy   Verified 12/02/20 18:59





Select A     


 


Anesthetics - Miriam Type- Allergy   Verified 12/02/20 18:59





Parabens     


 


atorvastatin Allergy   Verified 01/29/21 16:17


 


codeine [Codeine] Allergy   Verified 01/29/21 16:16


 


esomeprazole Allergy   Verified 01/29/21 16:17


 


levofloxacin [From Levaquin] Allergy   Verified 01/29/21 16:16


 


simvastatin Allergy   Verified 01/29/21 16:17











                               Current Medications





Acetaminophen (Acetaminophen 325 Mg Tab)  650 mg PO Q8H PRN


   PRN Reason: Headache/Fever/Mild Pain (1-3)


Dextrose/Water (Dextrose 50% Abboject 50 Ml Syringe)  25 gm SLOW IVP PRN PRN


   PRN Reason: Hypoglycemia


Diltiazem HCl (Diltiazem Hcl 30 Mg Tablet)  30 mg PO BID [facility]


Enoxaparin Sodium (Enoxaparin Sodium 40 Mg/0.4 Ml Syringe)  40 mg SC 2100 [facility]


   Last Admin: 02/15/21 20:46 Dose:  40 mg


   Documented by: 


Glucagon (Glucagon 1 Mg/Ml Vial)  1 mg IM PRN PRN


   PRN Reason: Hypoglycemia


Vancomycin HCl 1 gm/ Device  200 mls @ 200 mls/hr IVPB 0800,2000 [facility]


   Last Admin: 02/16/21 08:30 Dose:  200 mls


   Documented by: 


Cefepime HCl 1 gm/ Sodium (Chloride)  100 mls @ 200 mls/hr IVPB Q12HR [facility]


   Last Admin: 02/16/21 08:13 Dose:  100 mls


   Documented by: 


Dexmedetomidine HCl 400 mcg/ (Sodium Chloride)  100 mls @ 0 mls/hr IVPB INF SILVIANO;

Protocol


   Last Admin: 02/11/21 10:06 Dose:  100 mls


   Documented by: 


Dextrose/Water (D5w)  1,000 mls @ 0 mls/hr IV .Q0M PRN


   PRN Reason: Hypoglycemia


Insulin Glargine 10 units/ (Miscellaneous Medication)  0.1 mls @ 0 mls/hr SC QAM

SILVIANO


   Last Admin: 02/16/21 08:12 Dose:  0.1 mls


   Documented by: 


Amiodarone HCl 450 mg/Miscellaneous Medication 1 each/ Dextrose/Water  259 mls @

0 mls/hr IVPB INF SILVIANO; Protocol


   Last Admin: 02/16/21 07:57 Dose:  259 mls


   Documented by: 


Potassium Chloride/Dextrose/Sod Cl (D5 1/2 Ns W/20 Meq Kcl)  1,000 mls @ 50 

mls/hr IV .Q20H SILVIANO


   Last Admin: 02/16/21 10:42 Dose:  Not Given


   Documented by: 


Insulin Human Regular (Insulin Regular 300 Units/3 Ml Vial)  0 units SC 

.MODERATE SLIDING SC PRN


   PRN Reason: Moderate Correctional Scale


   Last Admin: 02/16/21 10:49 Dose:  2 unit


   Documented by: 


Levothyroxine Sodium (Levothyroxine Sodium 125 Mcg Tab)  125 mcg PO 0600 [facility]


   Last Admin: 02/16/21 05:22 Dose:  Not Given


   Documented by: 


Miscellaneous Medication (Electrolyte Replacement Protocol)  1 each IVPB ASDIR 

SILVIANO


Discontinue Previous Narcotic Pain Medications And Benzodiazepines  1 each FS 

.ONE [facility]


   Stop: 03/08/21 17:00


Ondansetron HCl (Ondansetron Pf 4 Mg/2 Ml Vial)  4 mg IVP Q6H PRN


   PRN Reason: Nausea/Vomiting


Pantoprazole Sodium (Pantoprazole 40 Mg Tab)  40 mg PO BID SILVIANO


   Last Admin: 02/16/21 08:13 Dose:  40 mg


   Documented by: 


Saccharomyces Boulardii (Saccharomyces Boulardii 250 Mg Cap)  250 mg PO DAILY 

[facility]


Sodium Chloride (Flush - Normal Saline 10 Ml Syringe)  10 ml IVF Q12HR SILVIANO


   Last Admin: 02/16/21 10:42 Dose:  10 ml


   Documented by: 


Sodium Chloride (Flush - Normal Saline 10 Ml Syringe)  10 ml IVF PRN PRN


   PRN Reason: Saline Flush


   Last Admin: 02/11/21 20:54 Dose:  10 ml


   Documented by: 








Vital Signs & Weight: 


                                   Vital Signs











  Temp Pulse Pulse Pulse Resp BP BP


 


 02/16/21 12:07    95  113 H   119/65  138/112 H


 


 02/16/21 10:19    76  91   112/55 L  121/76


 


 02/16/21 08:00       


 


 02/16/21 07:20   83    20  


 


 02/16/21 04:00  96.8 F L      














  Pulse Ox Pulse Ox Pulse Ox


 


 02/16/21 12:07   94 L  98


 


 02/16/21 10:19   


 


 02/16/21 08:00  100  


 


 02/16/21 07:20  96  


 


 02/16/21 04:00   








                                        











Admit Weight                   162 lb 0.636 oz


 


Weight                         160 lb 7.944 oz














I/O: 


                                       I/O











 02/15/21 02/16/21 02/17/21





 06:59 06:59 06:59


 


Intake Total 2095 2895 716.9


 


Output Total 615 45 75


 


Balance 1480 2850 641.9














- Quality Measures


Condition: Atrial Fibrillation/Flutter (hx or current)





- Medication Contraindications


No Anticoagulant reason: Treatment not indicated





- Physical Exam


General: alert & oriented x3, appears well, no apparent distress, speech clear, 

affect appropriate


HEENT: mucus membranes moist, normocephaly


Neck: supple neck, midline trachea, no JVD/HJR, no masses, no bruit, no lymphade

nopathy, no thromegaly


Cardiology: regular rate, irregularly irregular


Lungs: clear to auscultation, normal breath sounds, no wheeze, rales, rhonchi


Neurology: cranial nerve 2-12 intact, grossly intact, no lateralizing findings





- Labs


Result Diagrams: 


                                 02/16/21 05:43





                                 02/16/21 05:43





- EKG Interpretation


EKG Method: Telemetry


EKG shows: Atrial fibrillation





- Assessment/Plan


Assessment/Plan: 








1. Bradycardia arrest requiring CPR, witnessed event


   - with hyperkalemia


2. Recent COVID 19 infection 12/2020


3. Hx Viral cardiomyopathy with recovered LVEF


4. Atrial fibrillation


   - PVAI 2015


   - watchman 2016- no OAC needed for AF other than ASA 81mg daily


5. Dementia


6. Sacral decubitus ulcer with MRSA+ culture








No further bradycardia seen. oral dilt started in hopes of weaning off 

amiodarone gtt.  Cardiac arrest attributed to a correctable issue/electrolyte 

imbalance, so no ICD would be indicated. With her MRSA+ decub would refrain from

PPM implant. Continue rate control with AF. 





Discussed with Dr Gutierrez.

## 2021-02-16 NOTE — CON
DATE OF CONSULTATION:  02/16/2021



REASON FOR CONSULTATION:  Presacral decubitus ulcer.



HISTORY OF PRESENT ILLNESS:  A 75-year-old whom I had seen in [facility] at the beginning of December when she was admitted for COVID infection with

pneumonia.  She received remdesivir and Decadron, and she had hypotension for a

while there and was given antimicrobial therapy.  She did get better from the

COVID-19 and I was concerned that she might deteriorate.  She had some inspiratory

crackles at the bases, but she actually improved and she was discharged on Decadron

to complete 10 days and unfortunately had to be readmitted on February 6th.  Dr. De Jesus admitted the patient because of nausea and weakness.  She recently had an

endoscopy for evaluation of anemia.  They found a small ulceration in the gastric

antrum.  She was discharged and then she presented again to the hospital with

weakness and nausea.  At that time, she had a decubitus change in the presacral

region and then she became unresponsive and had CPR started.  She was intubated.

Potassium was elevated.  She was given calcium gluconate, bicarbonate, and insulin

with D50.  She became bradycardic and was noted to have a high digoxin level.  She

had to be paced in the emergency room and she was admitted to the ICU then and her

initial chest x-ray showed the ET tube, an apical pleural-based opacity, and

defibrillation paddle obscuring the lateral aspect of the right chest.  She had a

negative SARS-CoV-2 RT PCR on admission.  Other findings included a white cell count

of 12, hemoglobin 10, platelets 338, and 79% neutrophils.  INR 1.0.  Sodium 125,

potassium 6.7, creatinine 1.64.  Her baseline creatinine is normal at 0.6.  AST was

13, ALT 12, and alkaline phosphatase 161.  CK was 20.  Albumin 2.4.  Urinalysis with

greater than 50 wbc's and protein 30.  Abdomen and pelvis CT was done.  It showed a

right middle lobe and right lower lobe pneumonia, pleural effusions, lesion in the

lateral margin of left kidney, and retained stool in the rectum and colon.

Consultations were obtained with Dr. Bennett with Nephrology who managed the

hyperkalemia in the context of potassium supplementation and reduction of GFR.

Emergent hemodialysis was recommended.  Dr. Padron with Cardiology assessment was

apparent cardiac arrest, transcutaneous pacing for a period of time until she was

dialyzed and pneumonia, paroxysmal atrial fibrillation, history of Watchman

placement, so Dr. Padron's assessment is that the patient had returned to normal

sinus rhythm with a narrow QRS and improvement of hyperkalemia.  No major

interventions were recommended except for holding Multaq, Cardizem, and digoxin at

this time.  Dr. Bolden, the Pulmonary intensivist, was consulted.  His assessment was

respiratory failure after cardiac arrest and he was considering early extubation the

next day.  Finally, Dr. Gutierrez was consulted and his assessment was a history of

tachy-nathaniel syndrome, and so he thought that she might require dual-chamber

pacemaker to prevent bradycardia.  She was extubated recently, but has developed

some respiratory difficulty, probably from fatigue and she has received vancomycin

and cefepime for management of pneumonia.  She is on enoxaparin, Precedex,

amiodarone, ondansetron, and levothyroxine.  Currently, Ms. Garza is awake.  She is

able to answer questions.  She has been made DNR by the family.  She is tolerating

just nasal cannula O2 right now.  Denies headaches.  No shortness of breath.  No

chest pain.  She has pain in the presacral region from the ulceration and she has an

indwelling Gonzalez catheter. 



PAST MEDICAL HISTORY:  Type 2 diabetes; hypertension; hyperlipidemia; coronary

artery disease; tachy-nathaniel syndrome; recent COVID infection, which was moderate.

She did not stay more than a few days in the hospital at [place] and did not

require more than 2 L nasal cannula O2.  She was treated with remdesivir and

Decadron. 



PAST SURGICAL HISTORY:  Includes angioplasty with stenting, bypass graft surgery,

hysterectomy. 



SOCIAL HISTORY:  Former smoker.  Works at [employer] with .  Lives in [place].



ALLERGIES:  CODEINE AND LEVOFLOXACIN, NOT A TRUE ALLERGIC REACTION, MOSTLY CNS

EFFECTS. 



CURRENT MEDICATIONS:  

1. Amiodarone.

2. Cefepime.

3. Precedex.

4. Diltiazem.

5. Enoxaparin.

6. Insulin.

7. Levothyroxine.

8. Ondansetron.

9. Protonix.

10. Vancomycin.



FAMILY HISTORY:  Noncontributory.



PHYSICAL EXAMINATION:

VITAL SIGNS:  She has been afebrile since this admission.  /69, respiratory

rate 25. 

SKIN:  She has a stage 3 presacral ulcer.  About 40% of this ulcer has kind of a

dark cover.  The remainder is kind of light pink with a little bit of yellow exudate

in certain parts.  It has a sort of a stellate appearance.  It appears to be mostly

superficial except for the central part that might be a little deeper.  It has

little satellite lesions around it suggestive of Candida implantation.  She has

peripheral IV access. 

HEENT:  Ocular movements are conjugate.  Pupils are equal and reactive.  Oral cavity

is normal. 

NECK:  No jugular vein distention. 

LUNGS:  Faint basilar crackles. 

HEART:  S1 and S2.  Regular rate without murmurs. 

ABDOMEN:  Soft, not distended or tender.  No ascites.  No bladder distention. 

EXTREMITIES:  No joint inflammatory activity.  Pulses are 1+ in dorsalis pedis. 

NEUROLOGIC:  She is awake, knows her name.  She is not delirious.



LABORATORY DATA:  White cell count starts at 12, goes down to 8.9 four days later

after admission and then kind of plateaus there for a while and then all of a sudden

on February 15th was up to 18,000 and then now is 13,000.  This seems to coincide

with this respiratory decompensation and she had required BiPAP for a while.  She

has had repeat chest x-rays done.  The last one was done today and it showed

enlarged, but stable cardiomediastinal silhouette, small bilateral pleural

effusions, bibasilar infiltrates versus atelectasis.  Microbiology showed MRSA from

the culture and E faecalis.  I think this is probably more superficial than deep

really.  Most of the implantations are probably due to Candida.  In the Gram stain,

only a few gram-positive cocci were seen.  There were moderate WBCs from

inflammatory change.  In the culture, there is actually moderate MRSA E faecalis. 



ASSESSMENT AND PLAN:  

1. Coronary artery disease.

2. Diabetes type 2.

3. Tachyarrhythmia in the past with atrial fibrillation, treated with various

antiarrhythmic combinations. 

4. Recent admission for moderate case of COVID-19, which required only low-flow

oxygen administration, remdesivir, and Decadron. 

5. Now, admission with vomiting, severe hyperkalemia, renal insufficiency, and brief

cardiac arrest.  She has now developed a decubitus ulcer and this appears to be

stage 3 right now.  In the emphasis, she would be on the topical care, maybe a

little Silvadene and offloading plus oral Diflucan or IV Diflucan for the likely

Candida species superficial infection.  I do not think she has evidence to suggest

pneumonia at this point.  I would advise discontinuation of vancomycin and cefepime. 







Job ID:  [id number]

## 2021-02-17 LAB
ANION GAP SERPL CALC-SCNC: 14 MMOL/L (ref 10–20)
BASOPHILS # BLD AUTO: 0 THOU/UL (ref 0–0.2)
BASOPHILS NFR BLD AUTO: 0.3 % (ref 0–1)
BUN SERPL-MCNC: 10 MG/DL (ref 9.8–20.1)
CALCIUM SERPL-MCNC: 7.9 MG/DL (ref 7.8–10.44)
CHLORIDE SERPL-SCNC: 107 MMOL/L (ref 98–107)
CO2 SERPL-SCNC: 22 MMOL/L (ref 23–31)
CREAT CL PREDICTED SERPL C-G-VRATE: 0 ML/MIN (ref 70–130)
EOSINOPHIL # BLD AUTO: 0.1 THOU/UL (ref 0–0.7)
EOSINOPHIL NFR BLD AUTO: 0.5 % (ref 0–10)
GLUCOSE SERPL-MCNC: 203 MG/DL (ref 83–110)
HGB BLD-MCNC: 10.9 G/DL (ref 12–16)
LYMPHOCYTES # BLD: 1 THOU/UL (ref 1.2–3.4)
LYMPHOCYTES NFR BLD AUTO: 7.3 % (ref 21–51)
MAGNESIUM SERPL-MCNC: 1.6 MG/DL (ref 1.6–2.6)
MCH RBC QN AUTO: 27.4 PG (ref 27–31)
MCV RBC AUTO: 86.3 FL (ref 78–98)
MONOCYTES # BLD AUTO: 0.8 THOU/UL (ref 0.11–0.59)
MONOCYTES NFR BLD AUTO: 5.8 % (ref 0–10)
NEUTROPHILS # BLD AUTO: 11.5 THOU/UL (ref 1.4–6.5)
NEUTROPHILS NFR BLD AUTO: 86.1 % (ref 42–75)
PLATELET # BLD AUTO: 260 THOU/UL (ref 130–400)
POTASSIUM SERPL-SCNC: 3.9 MMOL/L (ref 3.5–5.1)
RBC # BLD AUTO: 3.98 MILL/UL (ref 4.2–5.4)
SODIUM SERPL-SCNC: 139 MMOL/L (ref 136–145)
WBC # BLD AUTO: 13.3 THOU/UL (ref 4.8–10.8)

## 2021-02-17 RX ADMIN — INSULIN HUMAN PRN UNIT: 100 INJECTION, SOLUTION PARENTERAL at 17:24

## 2021-02-17 RX ADMIN — AMIODARONE HYDROCHLORIDE SCH MLS: 50 INJECTION, SOLUTION INTRAVENOUS at 01:30

## 2021-02-17 RX ADMIN — INSULIN HUMAN PRN UNIT: 100 INJECTION, SOLUTION PARENTERAL at 10:53

## 2021-02-17 RX ADMIN — INSULIN GLARGINE SCH MLS: 100 INJECTION, SOLUTION SUBCUTANEOUS at 08:52

## 2021-02-17 RX ADMIN — Medication SCH: at 12:52

## 2021-02-17 RX ADMIN — INSULIN HUMAN PRN UNIT: 100 INJECTION, SOLUTION PARENTERAL at 12:37

## 2021-02-17 RX ADMIN — POTASSIUM CHLORIDE, DEXTROSE MONOHYDRATE AND SODIUM CHLORIDE SCH: 150; 5; 450 INJECTION, SOLUTION INTRAVENOUS at 08:31

## 2021-02-17 RX ADMIN — Medication SCH ML: at 21:40

## 2021-02-17 NOTE — PDOC.HOSPP
- Subjective


Encounter Date: 02/17/21


Encounter Time: 15:30


Subjective: 


Patient seen and examined for respiratory failure.  Denies any new complaints.  

Short of breath on minimal exertion.  Denies any chest pain or palpitations.





- Objective


Vital Signs & Weight: 


                             Vital Signs (12 hours)











  Pulse Pulse Pulse Resp BP BP Pulse Ox


 


 02/17/21 18:56        96


 


 02/17/21 14:40  83    29 H    98


 


 02/17/21 12:27   94  94   155/100 H  160/98 H 


 


 02/17/21 10:34  89    23 H    99














  Pulse Ox Pulse Ox


 


 02/17/21 18:56  


 


 02/17/21 14:40  


 


 02/17/21 12:27  100  100


 


 02/17/21 10:34  








                                     Weight











Admit Weight                   162 lb 0.636 oz


 


Weight                         2.54 oz











                            Most Recent Monitor Data











Heart Rate from ECG            91


 


NIBP                           144/109


 


NIBP BP-Mean                   120


 


Respiration from ECG           28


 


SpO2                           97














I&O: 


                                        











 02/16/21 02/17/21 02/18/21





 06:59 06:59 06:59


 


Intake Total 2895 2780.4 816.9


 


Output Total 45 1349 545


 


Balance 2850 1431.4 271.9











Result Diagrams: 


                                 02/17/21 10:11





                                 02/17/21 10:11


Additional Labs: 


                                   Accuchecks











  02/17/21 02/17/21 02/17/21





  17:23 12:34 10:11


 


POC Glucose  164 H  223 H  191 H














  02/17/21 02/16/21





  04:37 21:12


 


POC Glucose  145 H  147 H








                                        





Abnormal Lab Results - Last 48 hrs





02/16/21 05:43: Alkaline Phosphatase 407 H, Serum Total Protein 5.7 L, Albumin 

2.6 L, Albumin/Globulin Ratio 0.8 L


02/16/21 05:43: WBC 13.8 H, RBC 3.93 L, Hgb 10.5 L, Hct 34.0 L, MCH 26.7 L, MCHC

30.9 L, RDW 16.4 H, Neutrophils % 75.8 H, Lymphocytes % 14.2 L, Neutrophils # 

10.4 H, Monocytes # 1.1 H


02/16/21 07:35: ABG pH 7.50 H, ABG pCO2 31.1 L, ABG pO2 91.7 H, A-a O2 Gradient 

154.625 H, Sodium 133 L, Chloride 108 H


02/17/21 10:11: Carbon Dioxide 22 L


02/17/21 10:11: WBC 13.3 H, RBC 3.98 L, Hgb 10.9 L, Hct 34.4 L, MCHC 31.7 L, RDW

16.7 H, Neutrophils % 86.1 H, Lymphocytes % 7.3 L, Neutrophils # 11.5 H, 

Lymphocytes # 1.0 L, Monocytes # 0.8 H





Microbiology - Entire Visit





02/06/21 14:16   Venous blood - Right Arm   Blood Culture - Final


                            NO GROWTH IN 5 DAYS


02/06/21 14:16   Venous blood - Right Arm   Blood Culture - Final


                            NO GROWTH IN 5 DAYS


02/06/21 22:55   Sacral - Pending   Bacterial Culture - Final


                            Methicillin resistant S.aureus


                            Enterococcus faecalis


02/06/21 16:33   Urine tapia catheter   Urine Culture - Final








EKG Reviewed by me: Yes (Atrial fibrillation on telemetry)





Hospitalist ROS





- Review of Systems


Cardiovascular: denies: chest pain, palpitations, orthopnea, paroxysmal noc. 

dyspnea, edema, light headedness, other


Gastrointestinal: denies: nausea, vomiting, abdominal pain, diarrhea, constipat

ion, melena, hematochezia, other





- Medication


Medications: 


Active Medications











Generic Name Dose Route Start Last Admin





  Trade Name Freq  PRN Reason Stop Dose Admin


 


Albuterol/Ipratropium  3 ml  02/17/21 11:00  02/17/21 18:59





  Ipratropium/Albuterol Sulfate 3 Ml Neb  NEB   3 ml





  [id number] SILVIANO   Administration


 


Diltiazem HCl  30 mg  02/16/21 21:00  02/17/21 08:52





  Diltiazem Hcl 30 Mg Tablet  PO   30 mg





  BID SILVIANO   Administration


 


Enoxaparin Sodium  40 mg  02/13/21 21:00  02/16/21 20:25





  Enoxaparin Sodium 40 Mg/0.4 Ml Syringe  SC   40 mg





  2100 SILVIANO   Administration


 


Dexmedetomidine HCl 400 mcg/  100 mls @ 0 mls/hr  02/11/21 10:00  02/11/21 10:06





  Sodium Chloride  IVPB   100 mls





  INF SILVIANO   Administration





  Protocol  





  Per Protocol  


 


Insulin Glargine 10 units/  0.1 mls @ 0 mls/hr  02/14/21 09:00  02/17/21 08:52





  Miscellaneous Medication  SC   0.1 mls





  QAM SILVIANO   Administration


 


Insulin Human Regular  0 units  02/13/21 10:28  02/17/21 17:24





  Insulin Regular 300 Units/3 Ml Vial  SC   2 unit





  .MODERATE SLIDING SC PRN   Administration





  Moderate Correctional Scale  


 


Levothyroxine Sodium  125 mcg  02/07/21 06:00  02/17/21 08:51





  Levothyroxine Sodium 125 Mcg Tab  PO   Not Given





  0600 SILVIANO  


 


Pantoprazole Sodium  40 mg  02/16/21 09:00  02/17/21 08:52





  Pantoprazole 40 Mg Tab  PO   40 mg





  BID SILVIANO   Administration


 


Saccharomyces Boulardii  250 mg  02/17/21 09:00  02/17/21 08:52





  Saccharomyces Boulardii 250 Mg Cap  PO   250 mg





  DAILY SILVIANO   Administration


 


Sodium Chloride  10 ml  02/07/21 09:00  02/17/21 12:52





  Flush - Normal Saline 10 Ml Syringe  IVF   Not Given





  Q12HR SILVIANO  


 


Sodium Chloride  10 ml  02/07/21 00:45  02/11/21 20:54





  Flush - Normal Saline 10 Ml Syringe  IVF   10 ml





  PRN PRN   Administration





  Saline Flush  














Hospitalist Exam


Vitals: 


                             Vital Signs (12 hours)











  Pulse Pulse Pulse Resp BP BP Pulse Ox


 


 02/17/21 18:56        96


 


 02/17/21 14:40  83    29 H    98


 


 02/17/21 12:27   94  94   155/100 H  160/98 H 


 


 02/17/21 10:34  89    23 H    99














  Pulse Ox Pulse Ox


 


 02/17/21 18:56  


 


 02/17/21 14:40  


 


 02/17/21 12:27  100  100


 


 02/17/21 10:34  








                                     Weight











Admit Weight                   162 lb 0.636 oz


 


Weight                         2.54 oz











                            Most Recent Monitor Data











Heart Rate from ECG            91


 


NIBP                           144/109


 


NIBP BP-Mean                   120


 


Respiration from ECG           28


 


SpO2                           97














General Appearance: awake alert


Heart: RRR, no gallops


Respiratory: no wheezes, no ronchi


Gastrointestinal: soft, non-distended


Extremities: no cyanosis


Neurological: no new deficit





Hosp A/P


(1) Cardiac arrest


Code(s): I46.9 - CARDIAC ARREST, CAUSE UNSPECIFIED   Status: Acute   





(2) Acute respiratory failure with hypoxia


Code(s): J96.01 - ACUTE RESPIRATORY FAILURE WITH HYPOXIA   Status: Acute   





(3) CAD (coronary artery disease)


Code(s): I25.10 - ATHSCL HEART DISEASE OF NATIVE CORONARY ARTERY W/O ANG PCTRS  

Status: Chronic   


Qualifiers: 


   Coronary Disease-Associated Artery/Lesion type: bypass graft   Native vs. 

transplanted heart: native heart   Associated angina: without angina   Qualified

Code(s): I25.810 - Atherosclerosis of coronary artery bypass graft(s) without 

angina pectoris   





(4) DM type 2 (diabetes mellitus, type 2)


Status: Chronic   


Qualifiers: 


   Diabetes mellitus long term insulin use: with long term use 





(5) ARJUN (acute kidney injury)


Code(s): N17.9 - ACUTE KIDNEY FAILURE, UNSPECIFIED   





(6) HTN (hypertension)


Code(s): I10 - ESSENTIAL (PRIMARY) HYPERTENSION   Status: Chronic   


Qualifiers: 


   Hypertension type: essential hypertension   Qualified Code(s): I10 - 

Essential (primary) hypertension   





(7) Hyperkalemia


Code(s): E87.5 - HYPERKALEMIA   





- Plan


DVT proph w/SCDs





Acute hypoxic respiratory failure requiring mechanical ventilation


Continue NIPPV as needed.  Extubated 2/13.  Continue supportive care.  Patient 

is DO NOT RESUSCITATE





Infected decubitus ulcer stage IIIPOA


On fluconazole per ID.  Continue wound care





Hyperkalemia with potassium of 6.7 on admission causing cardiac arrestresolved


Status post 1 session of hemodialysis





ARJUN on CKD stage II


Resolved





Swallow dysfunction


Started on modified diet per speech recommendation on 2/17





Paroxysmal atrial fibrillation


S/p amiodarone loading.  Started on oral diltiazem for rate control.  Patient 

had digoxin toxicity on admission.  Patient has watchman device





Recent COVID-19 infection in December 2020 causing Physical deconditioning


Continue physical therapy and supportive care





Hypertension continue to monitor





Electrolyte imbalance


Replace magnesium





DVT and GI prophylaxis

## 2021-02-17 NOTE — RAD
CHEST 1 VIEW:

 

HISTORY: 

Ventilated patient.

 

COMPARISON: 

Radiograph prior day.

 

FINDINGS: 

There is a moderate right left layering pleural effusion.  Airspace opacities are present throughout 
the lungs.  No pneumothorax.

 

Multiple midline sternotomy wires.  Previous radiopaque device projecting over the middle mediastinum
.

 

IMPRESSION: 

Enlarging pleural effusions and slight worsening lung aeration.

 

POS: HOME

## 2021-02-17 NOTE — PDOC.CPN
- Subjective


Date: 02/17/21


Time: 12:15


Interval history: 





A little more SOB today. Still satting low 90's on NC. No chest pain. 





- Objective


Allergies/Adverse Reactions: 


                                    Allergies











Allergy/AdvReac Type Severity Reaction Status Date / Time


 


Anesthetics - Amide Type - Allergy   Verified 12/02/20 18:59





Select A     


 


Anesthetics - Miriam Type- Allergy   Verified 12/02/20 18:59





Parabens     


 


atorvastatin Allergy   Verified 01/29/21 16:17


 


codeine [Codeine] Allergy   Verified 01/29/21 16:16


 


esomeprazole Allergy   Verified 01/29/21 16:17


 


levofloxacin [From Levaquin] Allergy   Verified 01/29/21 16:16


 


simvastatin Allergy   Verified 01/29/21 16:17











Visit Medications: 


                               Current Medications





Acetaminophen (Acetaminophen 325 Mg Tab)  650 mg PO Q8H PRN


   PRN Reason: Headache/Fever/Mild Pain (1-3)


Albuterol/Ipratropium (Ipratropium/Albuterol Sulfate 3 Ml Neb)  3 ml NEB 

[id number] SCH


   Last Admin: 02/17/21 10:34 Dose:  3 ml


   Documented by: 


Dextrose/Water (Dextrose 50% Abboject 50 Ml Syringe)  25 gm SLOW IVP PRN PRN


   PRN Reason: Hypoglycemia


Diltiazem HCl (Diltiazem Hcl 30 Mg Tablet)  30 mg PO BID [facility]


   Last Admin: 02/17/21 08:52 Dose:  30 mg


   Documented by: 


Enoxaparin Sodium (Enoxaparin Sodium 40 Mg/0.4 Ml Syringe)  40 mg SC 2100 [facility]


   Last Admin: 02/16/21 20:25 Dose:  40 mg


   Documented by: 


Glucagon (Glucagon 1 Mg/Ml Vial)  1 mg IM PRN PRN


   PRN Reason: Hypoglycemia


Dexmedetomidine HCl 400 mcg/ (Sodium Chloride)  100 mls @ 0 mls/hr IVPB INF [facility];

Protocol


   Last Admin: 02/11/21 10:06 Dose:  100 mls


   Documented by: 


Dextrose/Water (D5w)  1,000 mls @ 0 mls/hr IV .Q0M PRN


   PRN Reason: Hypoglycemia


Insulin Glargine 10 units/ (Miscellaneous Medication)  0.1 mls @ 0 mls/hr SC QAM

[facility]


   Last Admin: 02/17/21 08:52 Dose:  0.1 mls


   Documented by: 


Amiodarone HCl 450 mg/Miscellaneous Medication 1 each/ Dextrose/Water  259 mls @

0 mls/hr IVPB INF SILVIANO; Protocol


   Last Admin: 02/17/21 01:30 Dose:  259 mls


   Documented by: 


Potassium Chloride/Dextrose/Sod Cl (D5 1/2 Ns W/20 Meq Kcl)  1,000 mls @ 50 

mls/hr IV .Q20H [facility]


   Last Admin: 02/17/21 08:31 Dose:  Not Given


   Documented by: 


Magnesium Sulfate 2 gm/ Device  50 mls @ 50 mls/hr IVPB NOW [facility]


   Stop: 02/17/21 13:15


Fluconazole/Sodium Chloride 100 mg/ Miscellaneous Medication  50 mls @ 100 

mls/hr IVPB DAILY [facility]


Fluconazole/Sodium Chloride 100 mg/ Miscellaneous Medication  50 mls @ 100 

mls/hr IVPB 1300 [facility]


   Stop: 02/17/21 15:00


Insulin Human Regular (Insulin Regular 300 Units/3 Ml Vial)  0 units SC 

.MODERATE SLIDING SC PRN


   PRN Reason: Moderate Correctional Scale


   Last Admin: 02/17/21 10:53 Dose:  4 unit


   Documented by: 


Levothyroxine Sodium (Levothyroxine Sodium 125 Mcg Tab)  125 mcg PO 0600 [facility]


   Last Admin: 02/17/21 08:51 Dose:  Not Given


   Documented by: 


Miscellaneous Medication (Electrolyte Replacement Protocol)  1 each IVPB ASDIR 

[facility]


Discontinue Previous Narcotic Pain Medications And Benzodiazepines  1 each FS 

.ONE [facility]


   Stop: 03/08/21 17:00


Ondansetron HCl (Ondansetron Pf 4 Mg/2 Ml Vial)  4 mg IVP Q6H PRN


   PRN Reason: Nausea/Vomiting


Pantoprazole Sodium (Pantoprazole 40 Mg Tab)  40 mg PO BID [facility]


   Last Admin: 02/17/21 08:52 Dose:  40 mg


   Documented by: 


Saccharomyces Boulardii (Saccharomyces Boulardii 250 Mg Cap)  250 mg PO DAILY 

[facility]


   Last Admin: 02/17/21 08:52 Dose:  250 mg


   Documented by: 


Sodium Chloride (Flush - Normal Saline 10 Ml Syringe)  10 ml IVF Q12HR [facility]


   Last Admin: 02/16/21 20:26 Dose:  Not Given


   Documented by: 


Sodium Chloride (Flush - Normal Saline 10 Ml Syringe)  10 ml IVF PRN PRN


   PRN Reason: Saline Flush


   Last Admin: 02/11/21 20:54 Dose:  10 ml


   Documented by: 








Vital Signs & Weight: 


                                   Vital Signs











  Temp Pulse Resp Pulse Ox


 


 02/17/21 10:34   89  23 H  99


 


 02/17/21 08:00     93 L


 


 02/17/21 07:52     99


 


 02/17/21 04:00  97.8 F   


 


 02/17/21 03:02     95








                                        











Admit Weight                   162 lb 0.636 oz


 


Weight                         2.54 oz

















- Medication Contraindications


No Anticoagulant reason: Treatment not indicated





- Physical Exam


HEENT: normocephaly


Neck: supple neck


Cardiac: irregularly regular


Lungs: scattered rhonchi


Neuro: grossly intact


Abdomen: active bowel sounds


Extremities: 1+ LE edema


Skin: clear


Musculoskeletal: no pain





- Labs


Result Diagrams: 


                                 02/17/21 10:11





                                 02/17/21 10:11


                                  Troponin/CKMB











Troponin I  0.685 ng/mL (< 0.028)  H*  02/07/21  03:38    














- Telemetry


Supraventricular conduction: atrial fibrillation





- Assessment/Plan


Assessment/Plan: 





1. Hyperkalemia, resolved.


2. Cardiac arrest secondary to hyperkalemia


3. Hypokalemia, resolved.


4. Paroxysmal afib. Currently in afib. 


5. Recent COVID-19 infection (Dec-2020). remains ventilated.


6. 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


7. Anemia, 2a to GI bleed.


8. Presence of a watchman device. 


9. Tachy/Alfredito syndrome. 


10. MRSA on decubitus wound. 








PLAN:


- Lenient rate control. 


- PPM in the future.


- Will stop amio drip today.  


- Continue PO dilt for rate control. Will stay away from digoxin for now. 


- Cannot do PPM in the setting of an active MRSA infection.

## 2021-02-17 NOTE — PRG
DATE OF SERVICE:  02/17/2021



SUBJECTIVE:  The patient was seen undergoing swallow evaluation, doing well, noted

with the following vital signs. 



OBJECTIVE:  VITAL SIGNS:  Blood pressure 165/100, pulse of 83, respiratory rate of

23, O2 saturation 100%. 

HEENT:  Unremarkable. 

CARDIOVASCULAR SYSTEM:  First and second heart sounds were heard. 

RESPIRATORY SYSTEM:  Clear to auscultation. 

DIGESTIVE SYSTEM:  Revealed a benign abdomen. 

EXTREMITIES:  No peripheral edema. 

SKIN:  No new gross rash. 

LYMPHATICS:  No peripheral lymphadenopathy.



LABORATORY INVESTIGATION:  Showed a white count of 13,300 with hemoglobin 10.9.

Chemistry showed creatinine of 0.61, potassium 3.9. 



IMPRESSION:  

1. Acute kidney injury, resolved.

2. Hyperkalemia, resulted in problem #3.

3. Cardiac arrest.



PLAN:  

1. Begin to de-escalate daily blood draws to avoid iatrogenic anemia in this patient.

2. Consider now that the patient is taking p.o. to discontinue IV fluid.

3. Renally dose all medications.

4. Further management to be dependent on the clinical course.







Job ID:  [id number]

## 2021-02-17 NOTE — EKG
Test Reason : STAT

Blood Pressure : ***/*** mmHG

Vent. Rate : 138 BPM     Atrial Rate : 131 BPM

   P-R Int : 000 ms          QRS Dur : 106 ms

    QT Int : 322 ms       P-R-T Axes : 000 -23 157 degrees

   QTc Int : 487 ms

 

Atrial fibrillation with rapid ventricular response with premature ventricular or aberrantly conducte
d complexes

Incomplete left bundle branch block

Marked ST abnormality, possible lateral subendocardial injury

Abnormal ECG

When compared with ECG of 12-FEB-2021 12:17, (Unconfirmed)

Atrial fibrillation has replaced Sinus rhythm

ST now depressed in Anterior leads

Confirmed by LAEJO PEREIRA (2) on 2/17/2021 3:23:40 PM

 

Referred By:             Confirmed By:ALEJO PEREIRA

## 2021-02-17 NOTE — EKG
Test Reason : CK RHYTHM

Blood Pressure : ***/*** mmHG

Vent. Rate : 115 BPM     Atrial Rate : 129 BPM

   P-R Int : 000 ms          QRS Dur : 120 ms

    QT Int : 336 ms       P-R-T Axes : 000 -15 172 degrees

   QTc Int : 464 ms

 

Atrial fibrillation with rapid ventricular response

Incomplete left bundle branch block

Marked ST abnormality, possible inferolateral subendocardial injury

Abnormal ECG

When compared with ECG of 06-FEB-2021 15:32, (Unconfirmed)

Atrial fibrillation has replaced Electronic ventricular pacemaker

Vent. rate has increased BY  45 BPM

Confirmed by ALEJO PEREIRA (2) on 2/17/2021 2:54:25 PM

 

Referred By:  DEBBIE           Confirmed By:ALEJO PEREIRA

## 2021-02-17 NOTE — PRG
DATE OF SERVICE:  02/17/2021



SUBJECTIVE:  Ms. Garza is clinically unchanged. 



She is very weak.  She declined to wear BiPAP.



OBJECTIVE:  VITAL SIGNS:  Her saturations are 99% on 4 L, blood pressure 107/67.

Heart rates in the 80s. 

GENERAL:  She weakly moves all of her extremities 

LUNGS:  Remarkable for mild rhonchi with a prolonged expiratory phase. 

HEART:  Regular rhythm. 

ABDOMEN:  Soft. 

EXTREMITIES:  Without edema.



LABORATORY DATA:  White count 13.8 yesterday.  There is no lab today.



IMPRESSION:  

1. Weakness and deconditioning.  She may not survive this.

2. Transient atrial fibrillation, not a clinical issue at this time.

3. Status post bradycardic arrest with an elevated potassium.

4. COVID in December which led to deconditioning.

5. Recent gastrointestinal bleed without recurrence.

6. History of diastolic dysfunction, mild-to-moderate mitral regurgitation.

7. Diabetes.

8. History of hypertension.

9. History of coronary artery bypass grafting.

10. ? history of dementia.



PLAN:  Continue supportive care.  No re-intubation is planned.  We will just keep

her on a nasal cannula since she finds the BiPAP very uncomfortable. 







Job ID:  [id number]

## 2021-02-17 NOTE — EKG
Test Reason : CK RHYTHM

Blood Pressure : ***/*** mmHG

Vent. Rate : 100 BPM     Atrial Rate : 100 BPM

   P-R Int : 176 ms          QRS Dur : 108 ms

    QT Int : 324 ms       P-R-T Axes : 045 -13 147 degrees

   QTc Int : 417 ms

 

Sinus rhythm with Premature supraventricular complexes

Incomplete left bundle branch block



Abnormal ECG

When compared with ECG of 08-FEB-2021 10:11, (Unconfirmed)

Sinus rhythm has replaced Atrial fibrillation

ST less depressed in Lateral leads

Confirmed by ALEJO PEREIRA (2) on 2/17/2021 3:20:30 PM

 

Referred By:  DEBBIE           Confirmed By:ALEJO PEREIRA

## 2021-02-18 LAB
ANION GAP SERPL CALC-SCNC: 13 MMOL/L (ref 10–20)
BASOPHILS # BLD AUTO: 0 THOU/UL (ref 0–0.2)
BASOPHILS NFR BLD AUTO: 0.3 % (ref 0–1)
BUN SERPL-MCNC: 8 MG/DL (ref 9.8–20.1)
CALCIUM SERPL-MCNC: 7.8 MG/DL (ref 7.8–10.44)
CHLORIDE SERPL-SCNC: 110 MMOL/L (ref 98–107)
CO2 SERPL-SCNC: 22 MMOL/L (ref 23–31)
CREAT CL PREDICTED SERPL C-G-VRATE: 99 ML/MIN (ref 70–130)
EOSINOPHIL # BLD AUTO: 0.1 THOU/UL (ref 0–0.7)
EOSINOPHIL NFR BLD AUTO: 0.8 % (ref 0–10)
GLUCOSE SERPL-MCNC: 135 MG/DL (ref 83–110)
HGB BLD-MCNC: 10.4 G/DL (ref 12–16)
LYMPHOCYTES # BLD: 1.3 THOU/UL (ref 1.2–3.4)
LYMPHOCYTES NFR BLD AUTO: 8.9 % (ref 21–51)
MCH RBC QN AUTO: 26.2 PG (ref 27–31)
MCV RBC AUTO: 85.4 FL (ref 78–98)
MONOCYTES # BLD AUTO: 0.9 THOU/UL (ref 0.11–0.59)
MONOCYTES NFR BLD AUTO: 6.4 % (ref 0–10)
NEUTROPHILS # BLD AUTO: 12.1 THOU/UL (ref 1.4–6.5)
NEUTROPHILS NFR BLD AUTO: 83.6 % (ref 42–75)
PLATELET # BLD AUTO: 478 THOU/UL (ref 130–400)
POTASSIUM SERPL-SCNC: 3.8 MMOL/L (ref 3.5–5.1)
RBC # BLD AUTO: 3.99 MILL/UL (ref 4.2–5.4)
SODIUM SERPL-SCNC: 141 MMOL/L (ref 136–145)
WBC # BLD AUTO: 14.5 THOU/UL (ref 4.8–10.8)

## 2021-02-18 RX ADMIN — INSULIN GLARGINE SCH MLS: 100 INJECTION, SOLUTION SUBCUTANEOUS at 08:28

## 2021-02-18 RX ADMIN — Medication SCH ML: at 20:04

## 2021-02-18 RX ADMIN — FLUCONAZOLE SCH MLS: 2 INJECTION, SOLUTION INTRAVENOUS at 09:50

## 2021-02-18 RX ADMIN — Medication SCH ML: at 08:30

## 2021-02-18 NOTE — PDOC.CPN
- Subjective


Date: 02/18/21


Time: 13:36


Interval history: 





She was hypoxic earlier this morning. She has been slowly getting worse int he 

past few days. Today it is the weakest I have seen her, she is taking shallow 

breaths and is severely deconditioned. 





- Review of Systems


ROS unobtainable: due to mental status





- Objective


Allergies/Adverse Reactions: 


                                    Allergies











Allergy/AdvReac Type Severity Reaction Status Date / Time


 


Anesthetics - Amide Type - Allergy   Verified 12/02/20 18:59





Select A     


 


Anesthetics - Miriam Type- Allergy   Verified 12/02/20 18:59





Parabens     


 


atorvastatin Allergy   Verified 01/29/21 16:17


 


codeine [Codeine] Allergy   Verified 01/29/21 16:16


 


esomeprazole Allergy   Verified 01/29/21 16:17


 


levofloxacin [From Levaquin] Allergy   Verified 01/29/21 16:16


 


simvastatin Allergy   Verified 01/29/21 16:17











Visit Medications: 


                               Current Medications





Acetaminophen (Acetaminophen 325 Mg Tab)  650 mg PO Q8H PRN


   PRN Reason: Headache/Fever/Mild Pain (1-3)


Albuterol/Ipratropium (Ipratropium/Albuterol Sulfate 3 Ml Neb)  3 ml NEB 

[id number]


   Last Admin: 02/18/21 11:16 Dose:  3 ml


   Documented by: 


Dextrose/Water (Dextrose 50% Abboject 50 Ml Syringe)  25 gm SLOW IVP PRN PRN


   PRN Reason: Hypoglycemia


Diltiazem HCl (Diltiazem Hcl 30 Mg Tablet)  30 mg PO BID [facility]


   Last Admin: 02/18/21 08:29 Dose:  Not Given


   Documented by: 


Enoxaparin Sodium (Enoxaparin Sodium 40 Mg/0.4 Ml Syringe)  40 mg SC 2100 [facility]


   Last Admin: 02/17/21 21:36 Dose:  40 mg


   Documented by: 


Glucagon (Glucagon 1 Mg/Ml Vial)  1 mg IM PRN PRN


   PRN Reason: Hypoglycemia


Dexmedetomidine HCl 400 mcg/ (Sodium Chloride)  100 mls @ 0 mls/hr IVPB INF [facility];

Protocol


   Last Admin: 02/11/21 10:06 Dose:  100 mls


   Documented by: 


Dextrose/Water (D5w)  1,000 mls @ 0 mls/hr IV .Q0M PRN


   PRN Reason: Hypoglycemia


Insulin Glargine 10 units/ (Miscellaneous Medication)  0.1 mls @ 0 mls/hr SC QAM

[facility]


   Last Admin: 02/18/21 08:28 Dose:  0.1 mls


   Documented by: 


Fluconazole/Sodium Chloride 100 mg/ Miscellaneous Medication  50 mls @ 100 

mls/hr IVPB DAILY [facility]


   Last Admin: 02/18/21 09:50 Dose:  50 mls


   Documented by: 


Insulin Human Regular (Insulin Regular 300 Units/3 Ml Vial)  0 units SC 

.MODERATE SLIDING SC PRN


   PRN Reason: Moderate Correctional Scale


   Last Admin: 02/17/21 17:24 Dose:  2 unit


   Documented by: 


Levothyroxine Sodium (Levothyroxine Sodium 125 Mcg Tab)  125 mcg PO 0600 [facility]


   Last Admin: 02/18/21 06:33 Dose:  125 mcg


   Documented by: 


Miscellaneous Medication (Electrolyte Replacement Protocol)  1 each IVPB ASDIR 

[facility]


Discontinue Previous Narcotic Pain Medications And Benzodiazepines  1 each FS 

.ONE [facility]


   Stop: 03/08/21 17:00


Ondansetron HCl (Ondansetron Pf 4 Mg/2 Ml Vial)  4 mg IVP Q6H PRN


   PRN Reason: Nausea/Vomiting


Pantoprazole Sodium (Pantoprazole 40 Mg Tab)  40 mg PO BID [facility]


   Last Admin: 02/18/21 08:29 Dose:  Not Given


   Documented by: 


Saccharomyces Boulardii (Saccharomyces Boulardii 250 Mg Cap)  250 mg PO DAILY 

[facility]


   Last Admin: 02/18/21 08:29 Dose:  Not Given


   Documented by: 


Sodium Chloride (Flush - Normal Saline 10 Ml Syringe)  10 ml IVF Q12HR [facility]


   Last Admin: 02/18/21 08:30 Dose:  10 ml


   Documented by: 


Sodium Chloride (Flush - Normal Saline 10 Ml Syringe)  10 ml IVF PRN PRN


   PRN Reason: Saline Flush


   Last Admin: 02/11/21 20:54 Dose:  10 ml


   Documented by: 








Vital Signs & Weight: 


                                   Vital Signs











  Temp Pulse Resp Pulse Ox


 


 02/18/21 11:20     95


 


 02/18/21 11:16   91  27 H  95


 


 02/18/21 08:12   99  32 H  90 L


 


 02/18/21 08:11   90  28 H  80 L


 


 02/18/21 08:08     80 L


 


 02/18/21 08:00     98


 


 02/18/21 04:00  96.5 F L   


 


 02/18/21 03:04     97








                                        











Admit Weight                   162 lb 0.636 oz


 


Weight                         156 lb 11.979 oz

















- Medication Contraindications


No Anticoagulant reason: Treatment not indicated





- Physical Exam


HEENT: mucus membranes moist


Neck: supple neck


Cardiac: irregularly regular


Lungs: scattered rhonchi


Neuro: no lateralizing findings


Abdomen: non-tender


Extremities: no edema


Skin: clear


Musculoskeletal: no pain





- Labs


Result Diagrams: 


                                 02/18/21 06:07





                                 02/18/21 06:07


                                  Troponin/CKMB











Troponin I  0.685 ng/mL (< 0.028)  H*  02/07/21  03:38    














- Telemetry


Supraventricular conduction: atrial fibrillation





- Assessment/Plan


Assessment/Plan: 





1. Hyperkalemia, resolved.


2. Cardiac arrest secondary to hyperkalemia


3. Hypokalemia, resolved.


4. Paroxysmal afib. Currently in afib. 


5. Recent COVID-19 infection (Dec-2020). remains ventilated.


6. 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


7. Anemia, 2a to GI bleed.


8. Presence of a watchman device. 


9. Tachy/Alfredito syndrome. 


10. MRSA on decubitus wound. 


11.Severe deconditioning. 








PLAN:


- She is severely ill, deconditioned. She accumulates fluid every time her afib 

goes just a little faster and decompensates quickly. She is so weak that her 

breathing is unlikely to improve. Agree with assessment that her condition is 

severe and she is unlikely to survive. 


- Family has decided comfort care for her. I think this is reasonable.

## 2021-02-18 NOTE — PDOC.HOSPP
- Subjective


Encounter Date: 02/18/21


Encounter Time: 15:00


non-verbal


Subjective: 


Patient seen and examined for for respiratory failure.  Family at the bedside





- Objective


Vital Signs & Weight: 


                             Vital Signs (12 hours)











  Pulse Resp Pulse Ox


 


 02/18/21 15:26  93  23 H  94 L


 


 02/18/21 11:20    95


 


 02/18/21 11:16  91  27 H  95


 


 02/18/21 08:12  99  32 H  90 L


 


 02/18/21 08:11  90  28 H  80 L


 


 02/18/21 08:08    80 L








                                     Weight











Admit Weight                   162 lb 0.636 oz


 


Weight                         156 lb 11.979 oz











                            Most Recent Monitor Data











Heart Rate from ECG            96


 


NIBP                           148/85


 


NIBP BP-Mean                   106


 


Respiration from ECG           30


 


SpO2                           97














I&O: 


                                        











 02/17/21 02/18/21 02/19/21





 06:59 06:59 06:59


 


Intake Total 2780.4 1883.9 50


 


Output Total 1349 925 280


 


Balance 1431.4 958.9 -230











Result Diagrams: 


                                 02/18/21 06:07





                                 02/18/21 06:07


Additional Labs: 


                                   Accuchecks











  02/18/21 02/18/21 02/18/21





  17:16 10:36 04:37


 


POC Glucose  155 H  153 H  119 H














  02/17/21





  23:15


 


POC Glucose  130 H











EKG Reviewed by me: Yes (Remains in atrial fibrillation)





Hospitalist ROS





- Review of Systems


Constitutional: reports: weakness (Generalized).  denies: fever, chills, sweats,

malaise, other


Gastrointestinal: denies: nausea, vomiting, abdominal pain, diarrhea, 

constipation, melena, hematochezia, other





- Medication


Medications: 


Active Medications











Generic Name Dose Route Start Last Admin





  Trade Name Freq  PRN Reason Stop Dose Admin


 


Albuterol/Ipratropium  3 ml  02/17/21 11:00  02/18/21 15:26





  Ipratropium/Albuterol Sulfate 3 Ml Neb  NEB   3 ml





  [id number] SILVIANO   Administration


 


Diltiazem HCl  30 mg  02/16/21 21:00  02/18/21 08:29





  Diltiazem Hcl 30 Mg Tablet  PO   Not Given





  BID SILVIANO  


 


Enoxaparin Sodium  40 mg  02/13/21 21:00  02/17/21 21:36





  Enoxaparin Sodium 40 Mg/0.4 Ml Syringe  SC   40 mg





  2100 SILVIANO   Administration


 


Dexmedetomidine HCl 400 mcg/  100 mls @ 0 mls/hr  02/11/21 10:00  02/11/21 10:06





  Sodium Chloride  IVPB   100 mls





  INF SILVIANO   Administration





  Protocol  





  Per Protocol  


 


Insulin Glargine 10 units/  0.1 mls @ 0 mls/hr  02/14/21 09:00  02/18/21 08:28





  Miscellaneous Medication  SC   0.1 mls





  QAM SILVIANO   Administration


 


Fluconazole/Sodium Chloride  50 mls @ 100 mls/hr  02/18/21 09:00  02/18/21 09:50





100 mg/ Miscellaneous  IVPB   50 mls





Medication  DAILY SILVIANO   Administration


 


Insulin Human Regular  0 units  02/13/21 10:28  02/17/21 17:24





  Insulin Regular 300 Units/3 Ml Vial  SC   2 unit





  .MODERATE SLIDING SC PRN   Administration





  Moderate Correctional Scale  


 


Levothyroxine Sodium  125 mcg  02/07/21 06:00  02/18/21 06:33





  Levothyroxine Sodium 125 Mcg Tab  PO   125 mcg





  0600 SILVIANO   Administration


 


Pantoprazole Sodium  40 mg  02/16/21 09:00  02/18/21 08:29





  Pantoprazole 40 Mg Tab  PO   Not Given





  BID SILVIANO  


 


Saccharomyces Boulardii  250 mg  02/17/21 09:00  02/18/21 08:29





  Saccharomyces Boulardii 250 Mg Cap  PO   Not Given





  DAILY SILVIANO  


 


Sodium Chloride  10 ml  02/07/21 09:00  02/18/21 08:30





  Flush - Normal Saline 10 Ml Syringe  IVF   10 ml





  Q12HR SILVIANO   Administration


 


Sodium Chloride  10 ml  02/07/21 00:45  02/11/21 20:54





  Flush - Normal Saline 10 Ml Syringe  IVF   10 ml





  PRN PRN   Administration





  Saline Flush  














Hospitalist Exam


Vitals: 


                             Vital Signs (12 hours)











  Pulse Resp Pulse Ox


 


 02/18/21 15:26  93  23 H  94 L


 


 02/18/21 11:20    95


 


 02/18/21 11:16  91  27 H  95


 


 02/18/21 08:12  99  32 H  90 L


 


 02/18/21 08:11  90  28 H  80 L


 


 02/18/21 08:08    80 L








                                     Weight











Admit Weight                   162 lb 0.636 oz


 


Weight                         156 lb 11.979 oz











                            Most Recent Monitor Data











Heart Rate from ECG            96


 


NIBP                           148/85


 


NIBP BP-Mean                   106


 


Respiration from ECG           30


 


SpO2                           97














General Appearance: awake alert


Neck: supple


Heart: RRR, no gallops


Respiratory: no wheezes, rhonchi


Gastrointestinal: soft, non-distended, normal bowel sounds


Extremities: no cyanosis, no clubbing





Hosp A/P


(1) Cardiac arrest


Code(s): I46.9 - CARDIAC ARREST, CAUSE UNSPECIFIED   Status: Acute   





(2) Acute respiratory failure with hypoxia


Code(s): J96.01 - ACUTE RESPIRATORY FAILURE WITH HYPOXIA   Status: Acute   





(3) CAD (coronary artery disease)


Code(s): I25.10 - ATHSCL HEART DISEASE OF NATIVE CORONARY ARTERY W/O ANG PCTRS  

Status: Chronic   


Qualifiers: 


   Coronary Disease-Associated Artery/Lesion type: bypass graft   Native vs. 

transplanted heart: native heart   Associated angina: without angina   Qualified

Code(s): I25.810 - Atherosclerosis of coronary artery bypass graft(s) without 

angina pectoris   





(4) DM type 2 (diabetes mellitus, type 2)


Status: Chronic   


Qualifiers: 


   Diabetes mellitus long term insulin use: with long term use 





(5) ARJUN (acute kidney injury)


Code(s): N17.9 - ACUTE KIDNEY FAILURE, UNSPECIFIED   





(6) HTN (hypertension)


Code(s): I10 - ESSENTIAL (PRIMARY) HYPERTENSION   Status: Chronic   


Qualifiers: 


   Hypertension type: essential hypertension   Qualified Code(s): I10 - 

Essential (primary) hypertension   





(7) Hyperkalemia


Code(s): E87.5 - HYPERKALEMIA   





- Plan





Acute hypoxic respiratory failure requiring mechanical ventilation


Continue supportive care.  Extubated 2/13.  Critical care following





Infected decubitus ulcer stage IIIPOA


Continue fluconazole per ID.  Continue wound care





ARJUN on CKD stage II/Hyperkalemia with potassium of 6.7 on admission causing 

cardiac arrestresolved


Status post 1 session of hemodialysis.  Continue to monitor





Swallow dysfunction


Continue modified diet per speech recommendation





Paroxysmal atrial fibrillation


S/p amiodarone loading.  Medications per cardiology.  Patient had digoxin 

toxicity on admission.  Patient has watchman device





Recent COVID-19 infection in December 2020 causing Physical deconditioning


Continue physical therapy and supportive care





Hypertension


Continue to monitor





Electrolyte imbalance


Continue to monitor





DVT and GI prophylaxis

## 2021-02-18 NOTE — PRG
DATE OF SERVICE:  02/18/2021



SUBJECTIVE:  Carrie Garza apparently had significant hypoxemia this morning.  She

was placed on BiPAP.  I have reminded staff that BiPAP is life support for her.  I

do not feel BiPAP is going to increase her chances of survival, so I asked that this

would be withdrawn and that she be placed on oxygen and that her family be notified

that she is nearing the end. 



OBJECTIVE:  LUNGS:  Unchanged. 

ABDOMEN:  Unchanged. 



I do not think she is strong enough to recover from this.



LABORATORY DATA:  White count 14.5, hemoglobin 10.4, platelets 478.  Electrolytes

are unchanged. 



IMPRESSION:  Severe deconditioning after COVID and gastrointestinal bleed, I do not

believe she can recover from.  She intermittently plugs up with mucus.  She also has

atrial fibrillation and a right pleural effusion that is associated with diastolic

heart failure.  She had hyperkalemia-induced cardiac arrest on admission, it has not

recurred. 



Moving forward with just comfort only measures.  I feel it is totally appropriate.







Job ID:  [id number]

## 2021-02-18 NOTE — RAD
PORTABLE CHEST:

 

DATE: 2/18/2021.

 

PROVIDED CLINICAL HISTORY: 

Respiratory insufficiency.

 

FINDINGS: 

Comparison 2/17/2021.  There is interval increase in the degree of right pleural fluid.  Bilateral ai
rspace disease and pulmonary vascular congestion persists.  There is no evidence for pneumothorax.  C
ardiac silhouette is not significantly changed in appearance.

 

IMPRESSION: 

Worsening degree of right pleural fluid.

 

POS: JEFF

## 2021-02-19 RX ADMIN — Medication SCH TAB: at 17:45

## 2021-02-19 RX ADMIN — Medication SCH ML: at 10:13

## 2021-02-19 RX ADMIN — INSULIN GLARGINE SCH MLS: 100 INJECTION, SOLUTION SUBCUTANEOUS at 09:20

## 2021-02-19 RX ADMIN — INSULIN HUMAN PRN UNIT: 100 INJECTION, SOLUTION PARENTERAL at 22:11

## 2021-02-19 RX ADMIN — FLUCONAZOLE SCH MLS: 2 INJECTION, SOLUTION INTRAVENOUS at 10:13

## 2021-02-19 RX ADMIN — Medication SCH ML: at 20:24

## 2021-02-19 NOTE — PRG
DATE OF SERVICE:  02/19/2021



OBJECTIVE:  VITAL SIGNS:  Ms. Garza's heart rate is in the 90s, respiratory rate is

in the 20s, oximetry is 100% on nasal cannula, blood pressure 148/70. 

LUNGS:  Clear. 

HEART:  Regular rhythm. 

ABDOMEN:  Soft. 

EXTREMITIES:  Without change.



LABORATORY DATA:  Chest radiographs are unchanged.



IMPRESSION:  

1. Atrial fibrillation with diastolic heart failure.

2. Extreme deconditioning.

3. COVID late last year.

4. Gastrointestinal bleed.

5. Do not resuscitate status.  It is my opinion that she has a very small chance of

surviving all this given her weakness. 



She will be transferred out of critical care unit for comfort care.  BiPAP will not

be restarted. 







Job ID:  [id number]

## 2021-02-19 NOTE — PDOC.HOSPP
- Subjective


Encounter Date: 02/19/21


Encounter Time: 14:00


Subjective: 


Patient seen and examined for respiratory failure.  Denies any new complaints.  

No overnight events.





- Objective


Vital Signs & Weight: 


                             Vital Signs (12 hours)











  Temp Pulse Resp Pulse Ox


 


 02/19/21 16:00  96.9 F L   


 


 02/19/21 14:11   85  18  100


 


 02/19/21 12:00  96.2 F L   


 


 02/19/21 09:57   91  28 H  100


 


 02/19/21 08:00  97.6 F   


 


 02/19/21 07:39   89  20  100








                                     Weight











Admit Weight                   162 lb 0.636 oz


 


Weight                         158 lb 11.725 oz











                            Most Recent Monitor Data











Heart Rate from ECG            98


 


NIBP                           148/90


 


NIBP BP-Mean                   109


 


Respiration from ECG           27


 


SpO2                           94














I&O: 


                                        











 02/18/21 02/19/21 02/20/21





 06:59 06:59 06:59


 


Intake Total 1883.9 80 180


 


Output Total 925 795 169


 


Balance 958.9 -715 11











Result Diagrams: 


                                 02/18/21 06:07





                                 02/18/21 06:07


Additional Labs: 


                                   Accuchecks











  02/19/21 02/19/21 02/19/21





  16:07 09:05 03:55


 


POC Glucose  98  138 H  88














  02/18/21 02/18/21





  21:26 17:16


 


POC Glucose  128 H  155 H








                                        





Abnormal Lab Results - Last 48 hrs





02/18/21 06:07: Chloride 110 H, Carbon Dioxide 22 L, BUN 8 L, Creatinine 0.56 L


02/18/21 06:07: WBC 14.5 H, RBC 3.99 L, Hgb 10.4 L, Hct 34.0 L, MCH 26.2 L, MCHC

30.6 L, RDW 16.9 H, Plt Count 478 H, Neutrophils % 83.6 H, Lymphocytes % 8.9 L, 

Neutrophils # 12.1 H, Monocytes # 0.9 H





Microbiology - Entire Visit





02/06/21 14:16   Venous blood - Right Arm   Blood Culture - Final


                            NO GROWTH IN 5 DAYS


02/06/21 14:16   Venous blood - Right Arm   Blood Culture - Final


                            NO GROWTH IN 5 DAYS


02/06/21 22:55   Sacral - Pending   Bacterial Culture - Final


                            Methicillin resistant S.aureus


                            Enterococcus faecalis


02/06/21 16:33   Urine tapia catheter   Urine Culture - Final








EKG Reviewed by me: Yes (GEOFF nye on telemetry)





Hospitalist ROS





- Review of Systems


Cardiovascular: denies: chest pain, palpitations, orthopnea, paroxysmal noc. 

dyspnea, edema, light headedness, other


Gastrointestinal: denies: nausea, vomiting, abdominal pain, diarrhea, 

constipation, melena, hematochezia, other





- Medication


Medications: 


Active Medications











Generic Name Dose Route Start Last Admin





  Trade Name Freq  PRN Reason Stop Dose Admin


 


Albuterol/Ipratropium  3 ml  02/17/21 11:00  02/19/21 14:11





  Ipratropium/Albuterol Sulfate 3 Ml Neb  NEB   3 ml





  [id number] SILVIANO   Administration


 


Calcium/Vitamin D  1 tab  02/19/21 17:00  02/19/21 17:45





  Calcium Carbonate 600 Mg + Vit D  Tab  PO   1 tab





  BID- SILVIANO   Administration


 


Diltiazem HCl  30 mg  02/16/21 21:00  02/19/21 07:57





  Diltiazem Hcl 30 Mg Tablet  PO   30 mg





  BID SILVIANO   Administration


 


Enoxaparin Sodium  40 mg  02/13/21 21:00  02/18/21 20:04





  Enoxaparin Sodium 40 Mg/0.4 Ml Syringe  SC   40 mg





  2100 SILVIANO   Administration


 


Insulin Glargine 10 units/  0.1 mls @ 0 mls/hr  02/14/21 09:00  02/19/21 09:20





  Miscellaneous Medication  SC   0.1 mls





  QAM SILVIANO   Administration


 


Fluconazole/Sodium Chloride  50 mls @ 100 mls/hr  02/18/21 09:00  02/19/21 10:13





100 mg/ Miscellaneous  IVPB   50 mls





Medication  DAILY SILVIANO   Administration


 


Insulin Human Regular  0 units  02/13/21 10:28  02/17/21 17:24





  Insulin Regular 300 Units/3 Ml Vial  SC   2 unit





  .MODERATE SLIDING SC PRN   Administration





  Moderate Correctional Scale  


 


Levothyroxine Sodium  125 mcg  02/07/21 06:00  02/19/21 06:00





  Levothyroxine Sodium 125 Mcg Tab  PO   Not Given





  0600 SILVIANO  


 


Pantoprazole Sodium  40 mg  02/16/21 09:00  02/19/21 07:57





  Pantoprazole 40 Mg Tab  PO   40 mg





  BID SILVIANO   Administration


 


Saccharomyces Boulardii  250 mg  02/17/21 09:00  02/19/21 07:57





  Saccharomyces Boulardii 250 Mg Cap  PO   250 mg





  DAILY SILVIANO   Administration


 


Sodium Chloride  10 ml  02/07/21 09:00  02/19/21 10:13





  Flush - Normal Saline 10 Ml Syringe  IVF   10 ml





  Q12HR SILVIANO   Administration


 


Sodium Chloride  10 ml  02/07/21 00:45  02/11/21 20:54





  Flush - Normal Saline 10 Ml Syringe  IVF   10 ml





  PRN PRN   Administration





  Saline Flush  














Hospitalist Exam


Vitals: 


                             Vital Signs (12 hours)











  Temp Pulse Resp Pulse Ox


 


 02/19/21 16:00  96.9 F L   


 


 02/19/21 14:11   85  18  100


 


 02/19/21 12:00  96.2 F L   


 


 02/19/21 09:57   91  28 H  100


 


 02/19/21 08:00  97.6 F   


 


 02/19/21 07:39   89  20  100








                                     Weight











Admit Weight                   162 lb 0.636 oz


 


Weight                         158 lb 11.725 oz











                            Most Recent Monitor Data











Heart Rate from ECG            98


 


NIBP                           148/90


 


NIBP BP-Mean                   109


 


Respiration from ECG           27


 


SpO2                           94














General Appearance: awake alert, ill appearing


Neck: supple, no JVD


Heart: no gallops, no rubs, irregular


Respiratory: no wheezes, rhonchi


Gastrointestinal: soft, non-distended, no guarding, no rigidity


Extremities: no cyanosis


Neurological: no new deficit





Hosp A/P


(1) Cardiac arrest


Code(s): I46.9 - CARDIAC ARREST, CAUSE UNSPECIFIED   Status: Acute   





(2) Acute respiratory failure with hypoxia


Code(s): J96.01 - ACUTE RESPIRATORY FAILURE WITH HYPOXIA   Status: Acute   





(3) CAD (coronary artery disease)


Code(s): I25.10 - ATHSCL HEART DISEASE OF NATIVE CORONARY ARTERY W/O ANG PCTRS  

Status: Chronic   


Qualifiers: 


   Coronary Disease-Associated Artery/Lesion type: bypass graft   Native vs. 

transplanted heart: native heart   Associated angina: without angina   Qualified

Code(s): I25.810 - Atherosclerosis of coronary artery bypass graft(s) without 

angina pectoris   





(4) DM type 2 (diabetes mellitus, type 2)


Status: Chronic   


Qualifiers: 


   Diabetes mellitus long term insulin use: with long term use 





(5) ARJUN (acute kidney injury)


Code(s): N17.9 - ACUTE KIDNEY FAILURE, UNSPECIFIED   





(6) HTN (hypertension)


Code(s): I10 - ESSENTIAL (PRIMARY) HYPERTENSION   Status: Chronic   


Qualifiers: 


   Hypertension type: essential hypertension   Qualified Code(s): I10 - 

Essential (primary) hypertension   





(7) Hyperkalemia


Code(s): E87.5 - HYPERKALEMIA   





- Plan





Acute hypoxic respiratory failure requiring mechanical ventilation


Continue supportive care.  Extubated 2/13.  Critical care following.  Will 

transfer to medical floor per critical care recommendation





Infected decubitus ulcer stage III


Continue fluconazole per ID.  Continue wound care.  No need for IV antibiotics 

per ID





ARJUN on CKD stage II/Hyperkalemia with potassium of 6.7 on admission causing 

cardiac arrestresolved


S/p 1 session of hemodialysis.  Continue to monitor





Swallow dysfunction


Continue modified diet per speech recommendation.





Paroxysmal atrial fibrillation


S/p amiodarone loading.  Continue oral Cardizem per cardiology.  Patient had 

digoxin toxicity on admission.  Patient has watchman device





Recent COVID-19 infection in December 2020 causing Physical deconditioning


Continue physical therapy and supportive care





Hypertension


Continue to monitor





Electrolyte imbalance


Continue to monitor and replace





DVT and GI prophylaxis





Plan of care was discussed with patient's spouse Mykel in detail.

## 2021-02-19 NOTE — PRG
DATE OF SERVICE:  02/19/2021



SUBJECTIVE:  The patient was seen, noted with the following vital signs.



OBJECTIVE:  VITAL SIGNS:  Blood pressure 143/72, heart rate of 84, respiratory rate

of 18, and O2 saturation 100%. 

HEENT:  Unremarkable 

CARDIOVASCULAR:  First and second heart sounds were heard. 

EXTREMITIES:  No peripheral edema. 

SKIN:  No new gross rash. 

LYMPHATICS:  No peripheral lymphadenopathy.



IMPRESSION:  

1. Cardiac arrest in the context of hyperkalemia, which resolved, status post one

session of hemodialysis. 

2. Hyperkalemia, resolved.



PLAN:  Continue current renal supportive measures.







Job ID:  [id number]

## 2021-02-19 NOTE — RAD
CHEST 1 VIEW:

 

HISTORY: 

Ventilated patient.

 

COMPARISON: 

Radiograph prior day.

 

FINDINGS: 

The pleural effusions and airspace consolidation is relatively similar given the rightward patient ro
tation.  Heart size is markedly enlarged.  Multiple midline sternotomy wires.  Aortic contour is ecta
tic.

 

IMPRESSION: 

Given the differences in patient rotation, similar exam of the chest.

 

POS: HOME

## 2021-02-20 LAB
ANION GAP SERPL CALC-SCNC: 12 MMOL/L (ref 10–20)
BASOPHILS # BLD AUTO: 0 THOU/UL (ref 0–0.2)
BASOPHILS NFR BLD AUTO: 0.4 % (ref 0–1)
BUN SERPL-MCNC: 8 MG/DL (ref 9.8–20.1)
CALCIUM SERPL-MCNC: 8.2 MG/DL (ref 7.8–10.44)
CHLORIDE SERPL-SCNC: 110 MMOL/L (ref 98–107)
CO2 SERPL-SCNC: 25 MMOL/L (ref 23–31)
CREAT CL PREDICTED SERPL C-G-VRATE: 96 ML/MIN (ref 70–130)
EOSINOPHIL # BLD AUTO: 0.1 THOU/UL (ref 0–0.7)
EOSINOPHIL NFR BLD AUTO: 0.6 % (ref 0–10)
GLUCOSE SERPL-MCNC: 162 MG/DL (ref 83–110)
HGB BLD-MCNC: 10.1 G/DL (ref 12–16)
LYMPHOCYTES # BLD: 1 THOU/UL (ref 1.2–3.4)
LYMPHOCYTES NFR BLD AUTO: 9 % (ref 21–51)
MAGNESIUM SERPL-MCNC: 1.8 MG/DL (ref 1.6–2.6)
MCH RBC QN AUTO: 26.7 PG (ref 27–31)
MCV RBC AUTO: 87 FL (ref 78–98)
MONOCYTES # BLD AUTO: 0.7 THOU/UL (ref 0.11–0.59)
MONOCYTES NFR BLD AUTO: 6.2 % (ref 0–10)
NEUTROPHILS # BLD AUTO: 8.8 THOU/UL (ref 1.4–6.5)
NEUTROPHILS NFR BLD AUTO: 83.8 % (ref 42–75)
PLATELET # BLD AUTO: 476 THOU/UL (ref 130–400)
POTASSIUM SERPL-SCNC: 3.6 MMOL/L (ref 3.5–5.1)
RBC # BLD AUTO: 3.8 MILL/UL (ref 4.2–5.4)
SODIUM SERPL-SCNC: 143 MMOL/L (ref 136–145)
WBC # BLD AUTO: 10.5 THOU/UL (ref 4.8–10.8)

## 2021-02-20 RX ADMIN — Medication SCH ML: at 21:44

## 2021-02-20 RX ADMIN — Medication SCH: at 16:05

## 2021-02-20 RX ADMIN — Medication SCH: at 09:33

## 2021-02-20 RX ADMIN — INSULIN GLARGINE SCH MLS: 100 INJECTION, SOLUTION SUBCUTANEOUS at 10:44

## 2021-02-20 RX ADMIN — Medication SCH ML: at 10:44

## 2021-02-20 RX ADMIN — FLUCONAZOLE SCH MLS: 2 INJECTION, SOLUTION INTRAVENOUS at 10:44

## 2021-02-20 NOTE — PDOC.HOSPP
- Subjective


Encounter Date: 02/20/21


Encounter Time: 13:00


Subjective: 


awakens easily, no sob, moves her extremities


has not eaten anything from am per staff, she will try to drink ensure





- Objective


Vital Signs & Weight: 


                             Vital Signs (12 hours)











  Temp Pulse Resp BP Pulse Ox


 


 02/20/21 14:37   89  20   98


 


 02/20/21 11:35   92  20   98


 


 02/20/21 08:18   97  20   98


 


 02/20/21 08:00  96.2 F L  85  22 H  130/82  95


 


 02/20/21 04:00  96.9 F L  94  20  133/80  94 L


 


 02/20/21 03:33      94 L








                                     Weight











Admit Weight                   162 lb 0.636 oz


 


Weight                         159 lb 7 oz











                            Most Recent Monitor Data











Heart Rate from ECG            97


 


NIBP                           155/85


 


NIBP BP-Mean                   108


 


Respiration from ECG           23


 


SpO2                           95














I&O: 


                                        











 02/19/21 02/20/21 02/21/21





 06:59 06:59 06:59


 


Intake Total 80 450 


 


Output Total 795 274 


 


Balance -715 176 











Result Diagrams: 


                                 02/20/21 08:55





                                 02/20/21 08:55


Additional Labs: 


                                   Accuchecks











  02/20/21 02/20/21 02/19/21





  12:10 05:53 22:09


 


POC Glucose  158 H  138 H  163 H














  02/19/21





  16:07


 


POC Glucose  98














Hospitalist ROS





- Medication


Medications: 


Active Medications











Generic Name Dose Route Start Last Admin





  Trade Name Freq  PRN Reason Stop Dose Admin


 


Albuterol/Ipratropium  3 ml  02/17/21 11:00  02/20/21 14:37





  Ipratropium/Albuterol Sulfate 3 Ml Neb  NEB   3 ml





  [id number]   Administration


 


Calcium/Vitamin D  1 tab  02/19/21 17:00  02/20/21 09:33





  Calcium Carbonate 600 Mg + Vit D  Tab  PO   Not Given





  BID-[facility]  


 


Diltiazem HCl  30 mg  02/16/21 21:00  02/20/21 09:33





  Diltiazem Hcl 30 Mg Tablet  PO   Not Given





  BID SILVIANO  


 


Enoxaparin Sodium  40 mg  02/13/21 21:00  02/19/21 20:27





  Enoxaparin Sodium 40 Mg/0.4 Ml Syringe  SC   40 mg





  2100 SILVIANO   Administration


 


Insulin Glargine 10 units/  0.1 mls @ 0 mls/hr  02/14/21 09:00  02/20/21 10:44





  Miscellaneous Medication  SC   0.1 mls





  QAM SILVIANO   Administration


 


Fluconazole/Sodium Chloride  50 mls @ 100 mls/hr  02/18/21 09:00  02/20/21 10:44





100 mg/ Miscellaneous  IVPB   50 mls





Medication  DAILY SILVIANO   Administration


 


Magnesium Sulfate 2 gm/ Device  50 mls @ 50 mls/hr  02/20/21 11:00  02/20/21 

11:46





  IVPB  02/20/21 15:00  50 mls





  NOW SILVIANO   Administration


 


Insulin Human Regular  0 units  02/13/21 10:28  02/19/21 22:11





  Insulin Regular 300 Units/3 Ml Vial  SC   2 unit





  .MODERATE SLIDING SC PRN   Administration





  Moderate Correctional Scale  


 


Levothyroxine Sodium  125 mcg  02/07/21 06:00  02/20/21 05:27





  Levothyroxine Sodium 125 Mcg Tab  PO   Not Given





  0600 SILVIANO  


 


Multivitamins  1 tab  02/20/21 09:00  02/20/21 09:34





  Multivit, Therapeutic 1 Tab  PO   Not Given





  DAILY SILVIANO  


 


Pantoprazole Sodium  40 mg  02/16/21 09:00  02/20/21 09:34





  Pantoprazole 40 Mg Tab  PO   Not Given





  BID SILVIANO  


 


Saccharomyces Boulardii  250 mg  02/17/21 09:00  02/20/21 09:34





  Saccharomyces Boulardii 250 Mg Cap  PO   Not Given





  DAILY SILVIANO  


 


Sodium Chloride  10 ml  02/07/21 09:00  02/20/21 10:44





  Flush - Normal Saline 10 Ml Syringe  IVF   10 ml





  Q12HR SILVIANO   Administration


 


Sodium Chloride  10 ml  02/07/21 00:45  02/11/21 20:54





  Flush - Normal Saline 10 Ml Syringe  IVF   10 ml





  PRN PRN   Administration





  Saline Flush  














Hospitalist Exam


Vitals: 


                             Vital Signs (12 hours)











  Temp Pulse Resp BP Pulse Ox


 


 02/20/21 14:37   89  20   98


 


 02/20/21 11:35   92  20   98


 


 02/20/21 08:18   97  20   98


 


 02/20/21 08:00  96.2 F L  85  22 H  130/82  95


 


 02/20/21 04:00  96.9 F L  94  20  133/80  94 L


 


 02/20/21 03:33      94 L








                                     Weight











Admit Weight                   162 lb 0.636 oz


 


Weight                         159 lb 7 oz











                            Most Recent Monitor Data











Heart Rate from ECG            97


 


NIBP                           155/85


 


NIBP BP-Mean                   108


 


Respiration from ECG           23


 


SpO2                           95














General Appearance: ill appearing


Eye: PERRL, anicteric sclera


ENT: no oropharyngeal lesions, dry oral mucosa


Neck: supple, no JVD


Heart: RRR, no murmur


Respiratory: no wheezes, no rales, rhonchi


Gastrointestinal: soft, non-tender, non-distended, normal bowel sounds


Extremities: no cyanosis, no edema


Neurological: cranial nerve grossly intact, no focal deficits





Hosp A/P


(1) Acute respiratory failure with hypoxia


Code(s): J96.01 - ACUTE RESPIRATORY FAILURE WITH HYPOXIA   Status: Resolved   





(2) ARJUN (acute kidney injury)


Code(s): N17.9 - ACUTE KIDNEY FAILURE, UNSPECIFIED   Status: Resolved   





(3) Hyperkalemia


Code(s): E87.5 - HYPERKALEMIA   Status: Resolved   





(4) CAD (coronary artery disease)


Code(s): I25.10 - ATHSCL HEART DISEASE OF NATIVE CORONARY ARTERY W/O ANG PCTRS  

Status: Chronic   


Qualifiers: 


   Coronary Disease-Associated Artery/Lesion type: bypass graft   Native vs. 

transplanted heart: native heart   Associated angina: without angina   Qualified

Code(s): I25.810 - Atherosclerosis of coronary artery bypass graft(s) without 

angina pectoris   





(5) DM type 2 (diabetes mellitus, type 2)


Status: Chronic   


Qualifiers: 


   Diabetes mellitus long term insulin use: with long term use 





(6) HTN (hypertension)


Code(s): I10 - ESSENTIAL (PRIMARY) HYPERTENSION   Status: Chronic   


Qualifiers: 


   Hypertension type: essential hypertension   Qualified Code(s): I10 - 

Essential (primary) hypertension   





(7) Dyslipidemia


Code(s): E78.5 - HYPERLIPIDEMIA, UNSPECIFIED   Status: Chronic   





(8) h/o covid 19 virus infection


Status: Chronic   





(9) A-fib


Code(s): I48.91 - UNSPECIFIED ATRIAL FIBRILLATION   Status: Chronic   


Qualifiers: 


   Atrial fibrillation type: paroxysmal   Qualified Code(s): I48.0 - Paroxysmal 

atrial fibrillation   





(10) Gastric ulcer


Code(s): K25.9 - GASTRIC ULCER, UNSP AS ACUTE OR CHRONIC, W/O HEMOR OR PERF   

Status: Acute   


Qualifiers: 


   Gastric ulcer chronicity: acute 





(11) Physical deconditioning


Code(s): R53.81 - OTHER MALAISE   Status: Acute   





- Plan





had brief cardiac arrest with cpr done in ER for electrolyte issues and volume 

depletion on arrival.


had one session of emergent HD done for hyperkalemia and arjun on admission, renal

function is at baseline. 


got extubated 2/13/21 and is in onc floor, is dnar.


afib in sinus, has watchman device, on cardizem 30mg bid


she has had multiple hospitalizations/rehab in [facility] in the last month and half, has not ambulated yet in those many days per 

.


severe deconditioning


continue protonix bid, synthroid, fluconazole, nebs and lantus.


sacral area with stage 3 decub present on admission.


echo in jan 2021 showed ef of 55% with rvsp of 65, she had covid pna and was 

diagnosed in dec 2020,  had it too and has recovered.


hemostable


encourage po intake, mobilize as tolerated


prognosis guarded

## 2021-02-20 NOTE — PDOC.FMACP
Advance Care Planning





- Problem


(1) Palliative care encounter


Status: Acute   Code(s): Z51.5 - ENCOUNTER FOR PALLIATIVE CARE   





(2) Physical deconditioning


Status: Acute   Code(s): R53.81 - OTHER MALAISE   





(3) h/o covid 19 virus infection


Status: Chronic   





(4) ARJUN (acute kidney injury)


Status: Resolved   Code(s): N17.9 - ACUTE KIDNEY FAILURE, UNSPECIFIED   





(5) Acute respiratory failure with hypoxia


Status: Resolved   Code(s): J96.01 - ACUTE RESPIRATORY FAILURE WITH HYPOXIA   





- Note


Participants: patient, palliative care


Summary: 


Palliative care addressed Advanced Care Planning.  The diagnosis, prognosis and 

goals of care were discussed.  Appropriate forms and documentation to accomplish

the goals of care were discussed.  All questions were answered.  





Confirmed DNAR status


Patient elected to complete OOHDNAR


Goal is hopeful to return to [place]/working with PT





Palliative care will follow up to readdress Goals with "Plan A and Plan B" 

depending on continued recovery verses disease trajectory.





Please also refer to palliative care notes in note section





Time Spent (mins): 15

## 2021-02-21 VITALS — DIASTOLIC BLOOD PRESSURE: 60 MMHG | SYSTOLIC BLOOD PRESSURE: 123 MMHG | TEMPERATURE: 98 F

## 2021-02-21 RX ADMIN — INSULIN GLARGINE SCH MLS: 100 INJECTION, SOLUTION SUBCUTANEOUS at 09:27

## 2021-02-21 RX ADMIN — Medication SCH ML: at 09:27

## 2021-02-21 RX ADMIN — FLUCONAZOLE SCH MLS: 2 INJECTION, SOLUTION INTRAVENOUS at 09:27

## 2021-02-21 NOTE — DIS
DATE OF ADMISSION:  02/06/2021



DATE OF DISCHARGE:  02/21/2021



DATE OF DEATH:  02/21/2021 at 10:30 a.m.



PRIMARY CAUSE OF DEATH:  

1. Severe physical deconditioning for the last 2 months.

2. Acute renal failure.

3. Cardiac arrest.

4. Acute respiratory failure secondary to above.



SECONDARY FACTORS CONTRIBUTING TO DEATH:  

1. Coronary artery disease.

2. Diabetes mellitus type 2.

3. Hypertension.

4. Chronic atrial fibrillation.



BRIEF COURSE DURING HOSPITALIZATION:  The patient initially got admitted on

02/06/2021 with complaints of nausea and vomiting.  She was sent over from [facility]

nursing [facility].  On arrival, the patient was found to have had hyperkalemia with

potassium of 6.7 and acute renal failure.  She had cardiac arrest on arrival and had

brief CPR with return of circulation.  This was a bradycardic arrest.  The patient

also had elevated digoxin levels.  She had a BUN of 81, creatinine of 1.64 on

admission.  During this process, the patient got intubated and was admitted to ICU.

She has had slow weaning from the ventilator.  The patient had one session of

hemodialysis with correction of her acute kidney injury and hyperkalemia.  She was

severely deconditioned with multiple hospitalizations and rehab placements in the

last 2 months.  She had not ambulated since then.  She was evaluated by Dr. Bolden

for Pulmonology, Dr. Padron for Cardiology, and Dr. Smith for Nephrology.

She was successfully extubated and was later placed on BiPAP off and on.  In view of

poor prognosis due to severe deconditioning and risk of re-intubation, the patient's

condition was discussed with her  and he did not want further resuscitation

on her.  She was transferred to Oncology floor, but had very poor oral intake.  She

was barely drinking half a cup of 

Ensure, but did eat a bit when her  came with meals.  This morning around

10:30, the patient breathed her last and was declared dead.  I have given complete

updates to  at bedside.  Her body will be released to her  per

hospital protocol. 







Job ID:  [id number]

## 2021-02-24 NOTE — PQF
CLINICAL DOCUMENTATION CLARIFICATION FORM:





Dear : Vidal Flores                         Date / Time: 
2/24/2021

Please exercise your independent, professional judgment in responding to the 
clarification form. 

Clinical indicators are provided on the bottom of this form for your review









Please check appropriate box(es) to clarify if the following diagnosis has been 
ruled in our ruled out:

Sepsis



[  ] Ruled in diagnosis

     [  ] Continue to treat        [  ] Resolved

[x  ] Ruled out diagnosis

[  ] Improving

[  ] Cannot rule out diagnosis

[  ] Other diagnosis ___________

[  ] Unable to determine

In addition, please specify:

Present on Admission (POA):  [  ] Yes             [  ] No             [  ] 
Unable to determine









To be completed by CDI/Coding staff for physician review: 







 Present Clinical Indicators - Signs / Symptoms / Labs Results and Location in 

Medical Record

 

[ x ]

 Sepsis, unclear source at this time.  It could be pneumonia versus UTI.  We 
will start her on broad-spectrum antibiotics.  Will go ahead and scan her 
abdomen to rule out any other abnormalities     H and P

 

[ x ] Respiratory rates were 32, 32, 32 and 34 ED provider notes

 

[ x ] WBCs were 12.2, 14.1, 11.5, 12.9, 18.8, 13.8 and 13.3 Laboratory

 

[ x ] Lactic acid is high at 4.5 Laboratory

 

[ x ] Sepsis and probable pneumonia, started on broad-spectrum antibiotics 
Consult note 2/8/21 by Rafal Sahu

 

Present Risk Factors                                                            
             Results and Location in Medical Record

 

[ x ] Acute respiratory failure, acute renal failure, sacral decubitus ulcer, 
cardiac arrest, diabetes and hypertension, history of covid-19 Progress note 
2/7/21 by Vidal Benton

 

Present Treatments                                                              
               Results and Location in Medical Record

 

[ x ] IV ceftriaxone Medications

 

[ x ] IV Vancomycin and IV Cefepime Medications

 

[ x ] ID consult Reports

 

[ x ] IV fluids Medications

 

[ x ] Daily CBCs Laboratory





CDS/ Signature:                      Phone #: _____________        
Date/Time: 2/25/2021



                 This is a permanent part of the Medical Record

[facility]